# Patient Record
Sex: MALE | Race: WHITE | Employment: OTHER | URBAN - METROPOLITAN AREA
[De-identification: names, ages, dates, MRNs, and addresses within clinical notes are randomized per-mention and may not be internally consistent; named-entity substitution may affect disease eponyms.]

---

## 2022-06-10 LAB — SARS-COV-2: POSITIVE

## 2022-06-16 ENCOUNTER — APPOINTMENT (OUTPATIENT)
Dept: CT IMAGING | Age: 87
DRG: 177 | End: 2022-06-16
Payer: MEDICARE

## 2022-06-16 ENCOUNTER — HOSPITAL ENCOUNTER (INPATIENT)
Age: 87
LOS: 19 days | Discharge: NURSING, REHAB OR LONG-TERM CARE | DRG: 177 | End: 2022-07-05
Attending: EMERGENCY MEDICINE | Admitting: INTERNAL MEDICINE
Payer: MEDICARE

## 2022-06-16 ENCOUNTER — APPOINTMENT (OUTPATIENT)
Dept: GENERAL RADIOLOGY | Age: 87
DRG: 177 | End: 2022-06-16
Payer: MEDICARE

## 2022-06-16 DIAGNOSIS — R13.10 DYSPHAGIA, UNSPECIFIED TYPE: ICD-10-CM

## 2022-06-16 DIAGNOSIS — T17.908A ASPIRATION INTO AIRWAY, INITIAL ENCOUNTER: Primary | ICD-10-CM

## 2022-06-16 PROBLEM — R53.1 GENERALIZED WEAKNESS: Status: ACTIVE | Noted: 2022-06-16

## 2022-06-16 PROBLEM — J69.0 ACUTE ASPIRATION PNEUMONIA (HCC): Status: ACTIVE | Noted: 2022-06-16

## 2022-06-16 PROBLEM — J96.01 ACUTE RESPIRATORY FAILURE WITH HYPOXIA (HCC): Status: ACTIVE | Noted: 2022-06-16

## 2022-06-16 PROBLEM — U07.1 COVID-19 VIREMIA: Status: ACTIVE | Noted: 2022-06-16

## 2022-06-16 LAB
ANION GAP SERPL CALCULATED.3IONS-SCNC: 14 MMOL/L (ref 3–16)
BASE EXCESS VENOUS: -2.2 MMOL/L (ref -2–3)
BASOPHILS ABSOLUTE: 0 K/UL (ref 0–0.2)
BASOPHILS RELATIVE PERCENT: 0.1 %
BUN BLDV-MCNC: 46 MG/DL (ref 7–20)
CALCIUM SERPL-MCNC: 9.2 MG/DL (ref 8.3–10.6)
CARBOXYHEMOGLOBIN: 1.1 % (ref 0–1.5)
CHLORIDE BLD-SCNC: 103 MMOL/L (ref 99–110)
CO2: 19 MMOL/L (ref 21–32)
CREAT SERPL-MCNC: 1.7 MG/DL (ref 0.8–1.3)
EOSINOPHILS ABSOLUTE: 0 K/UL (ref 0–0.6)
EOSINOPHILS RELATIVE PERCENT: 0.3 %
GFR AFRICAN AMERICAN: 46
GFR NON-AFRICAN AMERICAN: 38
GLUCOSE BLD-MCNC: 102 MG/DL (ref 70–99)
GLUCOSE BLD-MCNC: 136 MG/DL (ref 70–99)
HCO3 VENOUS: 23.2 MMOL/L (ref 24–28)
HCT VFR BLD CALC: 46.1 % (ref 40.5–52.5)
HEMOGLOBIN, VEN, REDUCED: 62.9 %
HEMOGLOBIN: 15.3 G/DL (ref 13.5–17.5)
LACTIC ACID: 3.5 MMOL/L (ref 0.4–2)
LYMPHOCYTES ABSOLUTE: 0.7 K/UL (ref 1–5.1)
LYMPHOCYTES RELATIVE PERCENT: 6.4 %
MCH RBC QN AUTO: 29.4 PG (ref 26–34)
MCHC RBC AUTO-ENTMCNC: 33.1 G/DL (ref 31–36)
MCV RBC AUTO: 88.6 FL (ref 80–100)
METHEMOGLOBIN VENOUS: 0.4 % (ref 0–1.5)
MONOCYTES ABSOLUTE: 0.8 K/UL (ref 0–1.3)
MONOCYTES RELATIVE PERCENT: 7.4 %
NEUTROPHILS ABSOLUTE: 9.1 K/UL (ref 1.7–7.7)
NEUTROPHILS RELATIVE PERCENT: 85.8 %
O2 SAT, VEN: 36 %
PCO2, VEN: 41.2 MMHG (ref 41–51)
PDW BLD-RTO: 15.5 % (ref 12.4–15.4)
PERFORMED ON: ABNORMAL
PH VENOUS: 7.36 (ref 7.35–7.45)
PLATELET # BLD: 289 K/UL (ref 135–450)
PMV BLD AUTO: 9 FL (ref 5–10.5)
PO2, VEN: <30 MMHG (ref 25–40)
POTASSIUM REFLEX MAGNESIUM: 4.8 MMOL/L (ref 3.5–5.1)
PRO-BNP: 8292 PG/ML (ref 0–449)
RBC # BLD: 5.2 M/UL (ref 4.2–5.9)
SODIUM BLD-SCNC: 136 MMOL/L (ref 136–145)
TCO2 CALC VENOUS: 24 MMOL/L
TROPONIN: <0.01 NG/ML
WBC # BLD: 10.7 K/UL (ref 4–11)

## 2022-06-16 PROCEDURE — 82803 BLOOD GASES ANY COMBINATION: CPT

## 2022-06-16 PROCEDURE — 85025 COMPLETE CBC W/AUTO DIFF WBC: CPT

## 2022-06-16 PROCEDURE — 6360000002 HC RX W HCPCS: Performed by: EMERGENCY MEDICINE

## 2022-06-16 PROCEDURE — 71250 CT THORAX DX C-: CPT

## 2022-06-16 PROCEDURE — 80048 BASIC METABOLIC PNL TOTAL CA: CPT

## 2022-06-16 PROCEDURE — 2580000003 HC RX 258: Performed by: INTERNAL MEDICINE

## 2022-06-16 PROCEDURE — 6360000002 HC RX W HCPCS: Performed by: INTERNAL MEDICINE

## 2022-06-16 PROCEDURE — 84484 ASSAY OF TROPONIN QUANT: CPT

## 2022-06-16 PROCEDURE — 83880 ASSAY OF NATRIURETIC PEPTIDE: CPT

## 2022-06-16 PROCEDURE — 36415 COLL VENOUS BLD VENIPUNCTURE: CPT

## 2022-06-16 PROCEDURE — 83605 ASSAY OF LACTIC ACID: CPT

## 2022-06-16 PROCEDURE — 96365 THER/PROPH/DIAG IV INF INIT: CPT

## 2022-06-16 PROCEDURE — 1200000000 HC SEMI PRIVATE

## 2022-06-16 PROCEDURE — 99285 EMERGENCY DEPT VISIT HI MDM: CPT

## 2022-06-16 PROCEDURE — 2580000003 HC RX 258: Performed by: EMERGENCY MEDICINE

## 2022-06-16 PROCEDURE — 71045 X-RAY EXAM CHEST 1 VIEW: CPT

## 2022-06-16 RX ORDER — ACETAMINOPHEN 325 MG/1
650 TABLET ORAL EVERY 6 HOURS PRN
Status: DISCONTINUED | OUTPATIENT
Start: 2022-06-16 | End: 2022-06-16 | Stop reason: SDUPTHER

## 2022-06-16 RX ORDER — SODIUM CHLORIDE 0.9 % (FLUSH) 0.9 %
10 SYRINGE (ML) INJECTION EVERY 12 HOURS SCHEDULED
Status: DISCONTINUED | OUTPATIENT
Start: 2022-06-16 | End: 2022-07-05 | Stop reason: HOSPADM

## 2022-06-16 RX ORDER — POLYETHYLENE GLYCOL 3350 17 G/17G
17 POWDER, FOR SOLUTION ORAL DAILY PRN
Status: DISCONTINUED | OUTPATIENT
Start: 2022-06-16 | End: 2022-07-05 | Stop reason: HOSPADM

## 2022-06-16 RX ORDER — SODIUM CHLORIDE 0.9 % (FLUSH) 0.9 %
10 SYRINGE (ML) INJECTION PRN
Status: DISCONTINUED | OUTPATIENT
Start: 2022-06-16 | End: 2022-07-05 | Stop reason: HOSPADM

## 2022-06-16 RX ORDER — ACETAMINOPHEN 325 MG/1
650 TABLET ORAL EVERY 4 HOURS PRN
Status: DISCONTINUED | OUTPATIENT
Start: 2022-06-16 | End: 2022-07-05 | Stop reason: HOSPADM

## 2022-06-16 RX ORDER — SODIUM CHLORIDE 9 MG/ML
INJECTION, SOLUTION INTRAVENOUS CONTINUOUS
Status: DISCONTINUED | OUTPATIENT
Start: 2022-06-16 | End: 2022-06-20

## 2022-06-16 RX ORDER — AMOXICILLIN AND CLAVULANATE POTASSIUM 875; 125 MG/1; MG/1
1 TABLET, FILM COATED ORAL 2 TIMES DAILY
Qty: 14 TABLET | Refills: 0 | Status: SHIPPED | OUTPATIENT
Start: 2022-06-16 | End: 2022-06-29 | Stop reason: HOSPADM

## 2022-06-16 RX ORDER — ONDANSETRON 2 MG/ML
4 INJECTION INTRAMUSCULAR; INTRAVENOUS EVERY 4 HOURS PRN
Status: DISCONTINUED | OUTPATIENT
Start: 2022-06-16 | End: 2022-07-05 | Stop reason: HOSPADM

## 2022-06-16 RX ORDER — IPRATROPIUM BROMIDE AND ALBUTEROL SULFATE 2.5; .5 MG/3ML; MG/3ML
1 SOLUTION RESPIRATORY (INHALATION) ONCE
Status: DISCONTINUED | OUTPATIENT
Start: 2022-06-16 | End: 2022-06-16

## 2022-06-16 RX ORDER — ACETAMINOPHEN 650 MG/1
650 SUPPOSITORY RECTAL EVERY 6 HOURS PRN
Status: DISCONTINUED | OUTPATIENT
Start: 2022-06-16 | End: 2022-06-16 | Stop reason: SDUPTHER

## 2022-06-16 RX ORDER — ACETAMINOPHEN 650 MG/1
650 SUPPOSITORY RECTAL EVERY 4 HOURS PRN
Status: DISCONTINUED | OUTPATIENT
Start: 2022-06-16 | End: 2022-07-05 | Stop reason: HOSPADM

## 2022-06-16 RX ORDER — ENOXAPARIN SODIUM 100 MG/ML
40 INJECTION SUBCUTANEOUS DAILY
Status: DISCONTINUED | OUTPATIENT
Start: 2022-06-17 | End: 2022-07-05 | Stop reason: HOSPADM

## 2022-06-16 RX ORDER — IPRATROPIUM BROMIDE AND ALBUTEROL SULFATE 2.5; .5 MG/3ML; MG/3ML
1 SOLUTION RESPIRATORY (INHALATION)
Status: DISCONTINUED | OUTPATIENT
Start: 2022-06-16 | End: 2022-06-16

## 2022-06-16 RX ORDER — SODIUM CHLORIDE 9 MG/ML
INJECTION, SOLUTION INTRAVENOUS PRN
Status: DISCONTINUED | OUTPATIENT
Start: 2022-06-16 | End: 2022-07-05 | Stop reason: HOSPADM

## 2022-06-16 RX ADMIN — SODIUM CHLORIDE, PRESERVATIVE FREE 10 ML: 5 INJECTION INTRAVENOUS at 22:13

## 2022-06-16 RX ADMIN — SODIUM CHLORIDE: 9 INJECTION, SOLUTION INTRAVENOUS at 23:19

## 2022-06-16 RX ADMIN — SODIUM CHLORIDE 3000 MG: 900 INJECTION INTRAVENOUS at 19:00

## 2022-06-16 RX ADMIN — SODIUM CHLORIDE 3000 MG: 900 INJECTION INTRAVENOUS at 23:18

## 2022-06-16 ASSESSMENT — PAIN DESCRIPTION - ORIENTATION: ORIENTATION: LEFT

## 2022-06-16 ASSESSMENT — PAIN SCALES - GENERAL: PAINLEVEL_OUTOF10: 0

## 2022-06-16 ASSESSMENT — PAIN DESCRIPTION - LOCATION: LOCATION: KNEE

## 2022-06-16 NOTE — ED PROVIDER NOTES
[address] [facility]          ATTENDING PHYSICIAN NOTE       Date of evaluation: 6/16/2022    Chief Complaint     Shortness of Breath (COVID positive, felt SOB. had xray done at [facility] and showed aspiration. normally not on oxygen, was satting at 80% on 2 L. put on 6 L by EMS )      History of Present Illness     Demond Mcghee is a 80 y.o. male who presents with an abnormal x-ray (showing possible aspiration) from [address] [place] [address],First Floor home where he was placed yesterday after a stay at [facility]. The patient had presented a week ago to [facility] after falling and not being able to get up. He was found to be generally weak and actually tested positive for COVID-19. As best I can tell from the records there, which are limited secondary to lack of a discharge summary, the patient tested positive for COVID-19 but never had an oxygen requirement. The nursing home apparently reported to EMS that he aspirated while attempting to eat and they had to place him on 2 L of oxygen but that was not helping. EMS apparently found the patient's oxygen saturation to be 80% and they placed him on 6 L and brought him in. The patient is overall a poor historian and hard to get any information from. The patient's family member was unaware that the patient ever required oxygen for COVID while in [facility]. Review of Systems     Review of Systems   Unable to perform ROS: Mental status change       Past Medical, Surgical, Family, and Social History     He has no past medical history on file. He has no past surgical history on file. His family history is not on file. He reports that he has never smoked. He has never used smokeless tobacco. He reports previous alcohol use. He reports that he does not use drugs. Medications     Previous Medications    No medications on file       Allergies     He has No Known Allergies.     Physical Exam     INITIAL VITALS: BP: 112/72, Temp: 98 °F (36.7 °C), Heart Rate: 82, Resp: 21, SpO2: 100 %   Physical Exam  Vitals and nursing note reviewed. Constitutional:       General: He is not in acute distress. Appearance: He is well-developed. He is ill-appearing. He is not diaphoretic. Cardiovascular:      Rate and Rhythm: Normal rate and regular rhythm. Pulmonary:      Effort: Pulmonary effort is normal. No tachypnea or accessory muscle usage. Breath sounds: Normal breath sounds. Abdominal:      General: Bowel sounds are normal. There is no distension. Palpations: Abdomen is soft. Tenderness: There is no abdominal tenderness. Skin:     General: Skin is warm and dry. Neurological:      Mental Status: He is alert and oriented to person, place, and time. Psychiatric:         Behavior: Behavior normal.         Diagnostic Results     RADIOLOGY:  CT CHEST WO CONTRAST   Final Result   1. Right lower lobe bronchovascular crowding and airspace disease, small pneumonia with adjacent pleural thickening   2. Large hiatal hernia   3. Cardiomegaly with coronary artery calcification   4. Vertebral body fracture of L1 is age indeterminate. XR CHEST PORTABLE   Final Result   1. Cardiomegaly and aortic tortuosity   2. Minimal hazy density right lung base, differential radiolucency of the lungs is secondary to patient rotation   3. Hypertrophic osteophytes of the shoulders, left greater than right          LABS:   Results for orders placed or performed during the hospital encounter of 06/16/22   CBC with Auto Differential   Result Value Ref Range    WBC 10.7 4.0 - 11.0 K/uL    RBC 5.20 4. 20 - 5.90 M/uL    Hemoglobin 15.3 13.5 - 17.5 g/dL    Hematocrit 46.1 40.5 - 52.5 %    MCV 88.6 80.0 - 100.0 fL    MCH 29.4 26.0 - 34.0 pg    MCHC 33.1 31.0 - 36.0 g/dL    RDW 15.5 (H) 12.4 - 15.4 %    Platelets 681 554 - 477 K/uL    MPV 9.0 5.0 - 10.5 fL    Neutrophils % 85.8 %    Lymphocytes % 6.4 %    Monocytes % 7.4 %    Eosinophils % 0.3 %    Basophils % 0.1 %    Neutrophils Absolute 9.1 (H) 1.7 - 7.7 K/uL    Lymphocytes Absolute 0.7 (L) 1.0 - 5.1 K/uL    Monocytes Absolute 0.8 0.0 - 1.3 K/uL    Eosinophils Absolute 0.0 0.0 - 0.6 K/uL    Basophils Absolute 0.0 0.0 - 0.2 K/uL   Basic Metabolic Panel w/ Reflex to MG   Result Value Ref Range    Sodium 136 136 - 145 mmol/L    Potassium reflex Magnesium 4.8 3.5 - 5.1 mmol/L    Chloride 103 99 - 110 mmol/L    CO2 19 (L) 21 - 32 mmol/L    Anion Gap 14 3 - 16    Glucose 102 (H) 70 - 99 mg/dL    BUN 46 (H) 7 - 20 mg/dL    CREATININE 1.7 (H) 0.8 - 1.3 mg/dL    GFR Non- 38 (A) >60    GFR  46 (A) >60    Calcium 9.2 8.3 - 10.6 mg/dL   Brain Natriuretic Peptide   Result Value Ref Range    Pro-BNP 8,292 (H) 0 - 449 pg/mL   Troponin   Result Value Ref Range    Troponin <0.01 <0.01 ng/mL   Blood Gas, Venous   Result Value Ref Range    pH, Ajay 7.359 7.350 - 7.450    pCO2, Ajay 41.2 41.0 - 51.0 mmHg    pO2, Ajay <30.0 25.0 - 40.0 mmHg    HCO3, Venous 23.2 (L) 24.0 - 28.0 mmol/L    Base Excess, Ajay -2.2 (L) -2.0 - 3.0 mmol/L    O2 Sat, Ajay 36 Not established %    Carboxyhemoglobin 1.1 0.0 - 1.5 %    MetHgb, Ajay 0.4 0.0 - 1.5 %    TC02 (Calc), Ajay 24 mmol/L    Hemoglobin, Ajay, Reduced 62.90 %   Lactic Acid   Result Value Ref Range    Lactic Acid 3.5 (H) 0.4 - 2.0 mmol/L   POCT Glucose   Result Value Ref Range    POC Glucose 136 (H) 70 - 99 mg/dl    Performed on ACCU-CHEK        RECENT VITALS:  BP: 107/84,Temp: 98 °F (36.7 °C), Heart Rate: 92, Resp: 25, SpO2: 95 %       ED Course     Nursing Notes, Past Medical Hx, Past Surgical Hx, Social Hx,Allergies, and Family Hx were reviewed.     patient was given the following medications:  Orders Placed This Encounter   Medications    DISCONTD: ipratropium-albuterol (DUONEB) nebulizer solution 1 ampule     Order Specific Question:   Initiate RT Bronchodilator Protocol     Answer:   No    DISCONTD: ipratropium-albuterol (DUONEB) nebulizer solution 1 ampule Order Specific Question:   Initiate RT Bronchodilator Protocol     Answer:   No    amoxicillin-clavulanate (AUGMENTIN) 875-125 MG per tablet     Sig: Take 1 tablet by mouth 2 times daily for 7 days Crushed in puree     Dispense:  14 tablet     Refill:  0    ampicillin-sulbactam (UNASYN) 3000 mg in 100 mL NS IVPB minibag     Order Specific Question:   Antimicrobial Indications     Answer:   Aspiration Pneumonia       CONSULTS:  None    MEDICAL DECISIONMAKING / ASSESSMENT / Skip Stephen is a 80 y.o. male with a recent hospitalization for a fall and deconditioning likely secondary to COVID-19 which she was diagnosed with 7 days ago who is presenting after a possible aspiration episode. Initially he was reportedly hypoxic but he has been off oxygen here the entire time and oxygen saturations have been 95 to 100%. He does not appear visibly short of breath. The patient's lung sounds are actually clear. The x-ray report was reviewed and it did say concern for possible aspiration with right lower lobe airspace disease. A repeat x-ray was done here which shows a similar finding but it was less clear what it represented therefore CT scan was performed which does show a small focal area of airspace disease consistent with pneumonia. Labs are reassuring his white count is normal.  proBNP was elevated but the significance is not clear because he is not hypoxic and does not appear fluid overloaded and CT is not showing pulmonary edema. I was able to review imaging and other records from [facility] with the exception of the discharge summary and it was found that the patient had undergone a swallowing evaluation there which was recommending thickened liquids and minced and moist and food and the patient was definitely not getting that type of diet at [facility]. In addition he had a negative chest x-ray there and Dopplers of his lower extremities that were both negative.   Based on the fact that the patient is not hypoxic here, was not getting a proper diet based on swallowing evaluation, and has reassuring labs, I feel he can be discharged back to the nursing home but started on oral antibiotics for possible aspiration pneumonia. He was given an initial dose of Unasyn IV here and will be prescribed Augmentin at discharge. All of these plans were reviewed with the patient's son and daughter and they are agreeable to this plan moving forward. Certainly if the patient develops worsening symptoms or becomes hypoxic, he can turn to the hospital for more aggressive management. The nursing home will be provided with the dietary recommendations by the speech therapist from [facility]. Clinical Impression     1. Aspiration into airway, initial encounter        Disposition     PATIENT REFERRED TO:  Lory Nieto, [address] [facility]    In 5 days      [facility]  [address]  [phone]    If symptoms worsen and becomes hypoxic      DISCHARGE MEDICATIONS:  New Prescriptions    AMOXICILLIN-CLAVULANATE (AUGMENTIN) 875-125 MG PER TABLET    Take 1 tablet by mouth 2 times daily for 7 days Crushed in puree       DISPOSITION Discharge - Pending Orders Complete 06/16/2022 06:31:17 PM       Isidoro Crockett MD  06/16/22 [id number]      ADDENDUM:  While the patient was getting his first dose of Unasyn, he was noted to have some increased work of breathing and his oxygen saturation dropped to 90%. He had to be placed on supplemental oxygen of a couple liters. At this point disposition has changed. The patient is likely now becoming symptomatic of the aspiration that occurred earlier in the day and I think will need to be staying in the hospital at this point and the hospitalist was contacted for admission.      Isidoro Crockett MD  06/16/22 [id number]

## 2022-06-16 NOTE — ED TRIAGE NOTES
Pt COVID positive, coming from [facility]. Per EMS, pt felt SOB and had x ray done, which showed aspiration. Pt was put on 2 L by nursing facility and o2 sat was in the 80s.  EMS put pt on 6 L oxygen en route and sats were 100%

## 2022-06-17 ENCOUNTER — APPOINTMENT (OUTPATIENT)
Dept: GENERAL RADIOLOGY | Age: 87
DRG: 177 | End: 2022-06-17
Payer: MEDICARE

## 2022-06-17 LAB
ANION GAP SERPL CALCULATED.3IONS-SCNC: 14 MMOL/L (ref 3–16)
BASOPHILS ABSOLUTE: 0.1 K/UL (ref 0–0.2)
BASOPHILS RELATIVE PERCENT: 0.9 %
BUN BLDV-MCNC: 45 MG/DL (ref 7–20)
CALCIUM SERPL-MCNC: 8.5 MG/DL (ref 8.3–10.6)
CHLORIDE BLD-SCNC: 108 MMOL/L (ref 99–110)
CO2: 18 MMOL/L (ref 21–32)
CREAT SERPL-MCNC: 1.7 MG/DL (ref 0.8–1.3)
EOSINOPHILS ABSOLUTE: 0 K/UL (ref 0–0.6)
EOSINOPHILS RELATIVE PERCENT: 0.2 %
GFR AFRICAN AMERICAN: 46
GFR NON-AFRICAN AMERICAN: 38
GLUCOSE BLD-MCNC: 98 MG/DL (ref 70–99)
HCT VFR BLD CALC: 38.6 % (ref 40.5–52.5)
HEMOGLOBIN: 13 G/DL (ref 13.5–17.5)
LYMPHOCYTES ABSOLUTE: 0.5 K/UL (ref 1–5.1)
LYMPHOCYTES RELATIVE PERCENT: 4.8 %
MCH RBC QN AUTO: 30 PG (ref 26–34)
MCHC RBC AUTO-ENTMCNC: 33.7 G/DL (ref 31–36)
MCV RBC AUTO: 89.2 FL (ref 80–100)
MONOCYTES ABSOLUTE: 0.8 K/UL (ref 0–1.3)
MONOCYTES RELATIVE PERCENT: 8.1 %
NEUTROPHILS ABSOLUTE: 8.5 K/UL (ref 1.7–7.7)
NEUTROPHILS RELATIVE PERCENT: 86 %
PDW BLD-RTO: 14.7 % (ref 12.4–15.4)
PLATELET # BLD: 223 K/UL (ref 135–450)
PMV BLD AUTO: 8.5 FL (ref 5–10.5)
POTASSIUM REFLEX MAGNESIUM: 4.4 MMOL/L (ref 3.5–5.1)
RBC # BLD: 4.33 M/UL (ref 4.2–5.9)
SODIUM BLD-SCNC: 140 MMOL/L (ref 136–145)
WBC # BLD: 9.9 K/UL (ref 4–11)

## 2022-06-17 PROCEDURE — 6370000000 HC RX 637 (ALT 250 FOR IP): Performed by: INTERNAL MEDICINE

## 2022-06-17 PROCEDURE — 6360000002 HC RX W HCPCS: Performed by: INTERNAL MEDICINE

## 2022-06-17 PROCEDURE — 97166 OT EVAL MOD COMPLEX 45 MIN: CPT

## 2022-06-17 PROCEDURE — 2580000003 HC RX 258: Performed by: INTERNAL MEDICINE

## 2022-06-17 PROCEDURE — 80048 BASIC METABOLIC PNL TOTAL CA: CPT

## 2022-06-17 PROCEDURE — 74230 X-RAY XM SWLNG FUNCJ C+: CPT

## 2022-06-17 PROCEDURE — 92610 EVALUATE SWALLOWING FUNCTION: CPT

## 2022-06-17 PROCEDURE — 1200000000 HC SEMI PRIVATE

## 2022-06-17 PROCEDURE — 92611 MOTION FLUOROSCOPY/SWALLOW: CPT

## 2022-06-17 PROCEDURE — 97530 THERAPEUTIC ACTIVITIES: CPT

## 2022-06-17 PROCEDURE — 36415 COLL VENOUS BLD VENIPUNCTURE: CPT

## 2022-06-17 PROCEDURE — 85025 COMPLETE CBC W/AUTO DIFF WBC: CPT

## 2022-06-17 RX ORDER — PANTOPRAZOLE SODIUM 40 MG/1
40 TABLET, DELAYED RELEASE ORAL 2 TIMES DAILY
Status: ON HOLD | COMMUNITY
End: 2022-06-23 | Stop reason: SDUPTHER

## 2022-06-17 RX ORDER — ACETAMINOPHEN 325 MG/1
650 TABLET ORAL EVERY 6 HOURS PRN
COMMUNITY

## 2022-06-17 RX ORDER — ZIPRASIDONE MESYLATE 20 MG/ML
10 INJECTION, POWDER, LYOPHILIZED, FOR SOLUTION INTRAMUSCULAR ONCE
Status: COMPLETED | OUTPATIENT
Start: 2022-06-18 | End: 2022-06-18

## 2022-06-17 RX ORDER — CALCIUM CARBONATE 200(500)MG
2 TABLET,CHEWABLE ORAL EVERY 6 HOURS PRN
COMMUNITY

## 2022-06-17 RX ORDER — METOPROLOL SUCCINATE 50 MG/1
50 TABLET, EXTENDED RELEASE ORAL DAILY
Status: ON HOLD | COMMUNITY
End: 2022-06-29 | Stop reason: HOSPADM

## 2022-06-17 RX ADMIN — SODIUM CHLORIDE 3000 MG: 900 INJECTION INTRAVENOUS at 05:51

## 2022-06-17 RX ADMIN — ACETAMINOPHEN 650 MG: 325 TABLET ORAL at 14:39

## 2022-06-17 RX ADMIN — SODIUM CHLORIDE 3000 MG: 900 INJECTION INTRAVENOUS at 23:30

## 2022-06-17 RX ADMIN — SODIUM CHLORIDE 3000 MG: 900 INJECTION INTRAVENOUS at 09:42

## 2022-06-17 RX ADMIN — ENOXAPARIN SODIUM 40 MG: 100 INJECTION SUBCUTANEOUS at 09:43

## 2022-06-17 RX ADMIN — SODIUM CHLORIDE, PRESERVATIVE FREE 10 ML: 5 INJECTION INTRAVENOUS at 19:35

## 2022-06-17 RX ADMIN — SODIUM CHLORIDE: 9 INJECTION, SOLUTION INTRAVENOUS at 14:33

## 2022-06-17 RX ADMIN — SODIUM CHLORIDE 3000 MG: 900 INJECTION INTRAVENOUS at 16:43

## 2022-06-17 ASSESSMENT — PAIN DESCRIPTION - LOCATION: LOCATION: GENERALIZED

## 2022-06-17 ASSESSMENT — PAIN SCALES - GENERAL: PAINLEVEL_OUTOF10: 8

## 2022-06-17 NOTE — PROGRESS NOTES
Occupational Therapy  Facility/Department: [address]  Occupational Therapy Initial Assessment & Tx    Name: Chandrika Arroyo  : 1935  MRN: [id number]  Date of Service: 2022    Discharge Recommendations: Chandrika Arroyo scored a 10/24 on the AM-PAC ADL Inpatient form. Current research shows that an AM-PAC score of 17 or less is typically not associated with a discharge to the patient's home setting. Based on the patient's AM-PAC score and their current ADL deficits, it is recommended that the patient have 3-5 sessions per week of Occupational Therapy at d/c to increase the patient's independence. Please see assessment section for further patient specific details. If patient discharges prior to next session this note will serve as a discharge summary. Please see below for the latest assessment towards goals. OT Equipment Recommendations  Equipment Needed: No  Other: defer       Patient Diagnosis(es): The encounter diagnosis was Aspiration into airway, initial encounter. Past Medical History:  has no past medical history on file. Past Surgical History:  has no past surgical history on file. Treatment Diagnosis: impaired mobility, transfers, ADL      Assessment   Performance deficits / Impairments: Decreased functional mobility ; Decreased ADL status; Decreased endurance;Decreased strength;Decreased cognition;Decreased posture  Assessment: Pt admit from rehab facility [address]. Questionable historian, confused and disoriented throughout session. Pt requiring Mod to MaxA with all bed mobility this date, limited by pain and cognition. Pt sitting EOB with Min to 100 Medical [place]. Unable to tolerate for longer, unable to tolerate standing at this time. Assist with ADLs. Would benefit from cont OT while in acute care and cont inpt at [place].   Treatment Diagnosis: impaired mobility, transfers, ADL  Decision Making: Medium Complexity  REQUIRES OT FOLLOW-UP: Yes  Activity Tolerance  Activity Tolerance: Patient limited by pain; Patient limited by fatigue;Treatment limited secondary to decreased cognition        Plan   Plan  Times per Week: 2-5  Times per Day: Daily     Restrictions  Position Activity Restriction  Other position/activity restrictions: up with assist    Subjective   General  Chart Reviewed: Yes  Additional Pertinent Hx: 80y.o. year-old male who was admitted to [facility] 1 week ago for falls and increased weakness. He was found to have COVID-19 but had no symptoms other than increased weakness. While there he failed a swallow evaluation and was advised to follow a dysphagia diet with thickened liquids. He was discharged yesterday to [facility] for physical therapy rehab. Apparently the recommended diet was not followed and he aspirated. He developed shortness of breath and an x-ray was done which was consistent with aspiration. On evaluation in the emergency room he was found to have a new oxygen requirement associated with aspiration pneumonia. Referring Practitioner: Jenelle Martins MD  Diagnosis: aspiration  Subjective  Subjective: In bed agreeable with encouragement, reporting pain in bilat LE, confused at times, [place]     Social/Functional History  Social/Functional History  Lives With: Alone  Type of Home: House  Home Equipment: [place]  Has the patient had two or more falls in the past year or any fall with injury in the past year?: Yes  Additional Comments: Questionable historian. Cognition, disoriented, + [place]. Reporting from home alone stating son and daughter can check in. Son lives in [place], daughter in [place]. Per chart pt with recent admission to [facility] and dc to rehab. Pt reporting he has not walked in a long time, uses a cane for amb.        Objective   Heart Rate: 91  Heart Rate Source: Monitor  BP: (!) 134/92  BP Location: Right upper arm  BP Method: Automatic  MAP (Calculated): 106  Resp: 18  SpO2: 98 %  O2 Device: Nasal cannula  Hearing: Exceptions to [facility]  Hearing Exceptions: Hard of hearing/hearing concerns          Safety Devices  Type of Devices: All fall risk precautions in place; Bed alarm in place;Call light within reach; Patient at risk for falls; Left in bed;Nurse notified  Bed Mobility Training  Bed Mobility Training: Yes  Overall Level of Assistance: Maximum assistance  Rolling: Moderate assistance  Supine to Sit: Moderate assistance  Sit to Supine: Maximum assistance  Scooting: Maximum assistance;Assist X2  Balance  Sitting: With support (Min to 100 Medical [place] sit EOB ~5-7 min unable to tolerate 2/2 pain)  Standing:  (unable)  Transfer Training  Transfer Training: No (unable)     AROM: Generally decreased, functional (BUE)  PROM: Within functional limits (BUE)  Strength: Generally decreased, functional (BUE limited testing 2/2 cognition and command following)  ADL  Feeding: NPO  Grooming: Moderate assistance (wipe face seated EOB)  UE Dressing: Moderate assistance  LE Dressing: Dependent/Total  Toileting:  (cath and brief donned)                 Cognition  Overall Cognitive Status: Exceptions  Arousal/Alertness: Delayed responses to stimuli  Following Commands: Inconsistently follows commands  Attention Span: Difficulty attending to directions; Difficulty dividing attention  Memory: Decreased recall of biographical Information;Decreased recall of recent events;Decreased short term memory  Safety Judgement: Decreased awareness of need for safety;Decreased awareness of need for assistance  Problem Solving: Decreased awareness of errors  Insights: Decreased awareness of deficits  Initiation: Requires cues for some  Sequencing: Requires cues for some                  Education Given To: Patient  Education Provided: Role of Therapy;Plan of Care  Education Method: Demonstration;Verbal  Barriers to Learning: Hearing;Cognition  Education Outcome: Continued education needed; Unable to verbalize; Unable to demonstrate understanding                        AM-PAC Score        AM-PAC Inpatient Daily Activity Raw Score: 10 (06/17/22 1155)  AM-PAC Inpatient ADL T-Scale Score : 27.31 (06/17/22 1155)  ADL Inpatient CMS 0-100% Score: 74.7 (06/17/22 1155)  ADL Inpatient CMS G-Code Modifier : CL (06/17/22 1155)    Goals  Short Term Goals  Time Frame for Short term goals: dc  Short Term Goal 1: supine to sit [place]  Short Term Goal 2: sit balance CGA EOB for 5 min  Short Term Goal 3: grooming ADL EOB with CGA  Short Term Goal 4: Tolerate fx transfer assessment       Therapy Time   Individual Concurrent Group Co-treatment   Time In 0820         Time Out 0900         Minutes 40              Timed Code Treatment Minutes:   25    Total Treatment Minutes:  JOSE ROBERTO Jung

## 2022-06-17 NOTE — H&P
Hospital Medicine  History and Physical    PCP: Adalberto Conner MD  Patient Name: Anna Solis    Date of Service: Pt seen/examined on 6/16/22 and admitted to Inpatient with expected LOS greater than two midnights due to medical therapy    CHIEF COMPLAINT:  Aspiration Pneumonia  HISTORY OF PRESENT ILLNESS:   Patient is a poor historian at this time, and information for this report is derived from conversation with the emergency room physician, review of the records and from conversations with patient's family at bedside. Pt is an 80y.o. year-old male who was admitted to [facility] 1 week ago for falls and increased weakness. He was found to have COVID-19 but had no symptoms other than increased weakness. While there he failed a swallow evaluation and was advised to follow a dysphagia diet with thickened liquids. He was discharged yesterday to [facility] for physical therapy rehab. Apparently the recommended diet was not followed and he aspirated. He developed shortness of breath and an x-ray was done which was consistent with aspiration. On evaluation in the emergency room he was found to have a new oxygen requirement associated with aspiration pneumonia. He is being admitted for further evaluation and treatment. Associated signs and symptoms do not include fever or chills, nausea, vomiting, abdominal pain, hemoptysis, hematochezia, diarrhea, constipation or urinary symptoms. Past Medical History:    No past medical history on file. Past Surgical History:    No past surgical history on file. Allergies:  Patient has no known allergies. Medications Prior to Admission:    Prior to Admission medications    Medication Sig Start Date End Date Taking?  Authorizing Provider   amoxicillin-clavulanate (AUGMENTIN) 875-125 MG per tablet Take 1 tablet by mouth 2 times daily for 7 days Crushed in puree 6/16/22 6/23/22 Yes Greta Glez MD       Family History:   Family history is negative for accelerated coronary artery disease, diabetes or malignancies. Social History:   TOBACCO:   reports that he has never smoked. He has never used smokeless tobacco.  ETOH:   reports previous alcohol use. OCCUPATION:      REVIEW OF SYSTEMS:  A full review of systems was performed and is negative except for that which appears in the HPI    Physical Exam:    Vitals: /84   Pulse 92   Temp 98 °F (36.7 °C) (Oral)   Resp 25   Wt 156 lb (70.8 kg)   SpO2 95%   General appearance: Ill appearing 80y.o. year-old male who is confused, appears stated age  HEENT: Head: Normocephalic, no lesions, without obvious abnormality. Eye: Normal external eye, conjunctiva, lids cornea, PEERL. Ears: Normal external ears. Non-tender. Nose: Normal external nose, mucus membranes and septum. Pharynx: Dental Hygiene adequate. Normal buccal mucosa. Normal pharynx. Neck: no adenopathy, no carotid bruit, no JVD, supple, symmetrical, trachea midline and thyroid not enlarged, symmetric, no tenderness/mass/nodules  Lungs: clear to auscultation bilaterally and no use of accessory muscles  Heart: regular rate and rhythm, S1, S2 normal, no murmur, click, rub or gallop and normal apical impulse  Abdomen: soft, non-tender; bowel sounds normal; no masses, no organomegaly  Extremities: extremities atraumatic, no cyanosis or edema and Homans sign is negative, no sign of DVT. Capillary Refill: Acceptable < 3 seconds   Peripheral Pulses: +3 easily felt, not easily obliterated with pressures   Skin: Skin color, texture, turgor normal. No rashes or lesions on exposed skin  Neurologic: Neurovascularly intact without any focal sensory/motor deficits. Cranial nerves: II-XII intact, grossly non-focal. Gait was not tested.   Mental Status: Alert and oriented x 0, thought content inappropriate, poor insight        CBC:   Recent Labs     06/16/22  1550   WBC 10.7   HGB 15.3        BMP:    Recent Labs     06/16/22  1550    K 4.8      CO2 19*   BUN 46*   CREATININE 1.7*   GLUCOSE 102*     Troponin:   Recent Labs     06/16/22  1550   TROPONINI <0.01     PT/INR:  No results found for: PTINR  U/A:  No results found for: LEUKOCYTESUR, NITRITE, RBCUA, SPECGRAV, UROBILINOGEN, BILIRUBINUR, BLOODU, Trejo Dunnings      RAD:   I have independently reviewed and interpreted the imaging studies below and based my recommendations to the patient on those findings. CT CHEST WO CONTRAST    Result Date: 6/16/2022  k EXAM: CT CHEST WITHOUT CONTRAST INDICATION: Abnormal chest x-ray, right lower lobe airspace disease COMPARISON: 6/16/2022 TECHNIQUE: CT of the chest was performed without contrast. Axial images, multiplanar reformatted images and axial maximum intensity projection images provided for review. Individualized dose optimization technique was used in order to meet ALARA standards for radiation dose reduction. In addition to vendor specific dose reduction algorithms, the dose reduction techniques vary based on the specific scanner utilized but frequently include automated exposure control, adjustment of the mA and/or kV  according to patient size, and use of iterative reconstruction technique. CONTRAST: None. FINDINGS: There is a large hiatal hernia present which extends to the right Right basilar airspace disease, bronchovascular crowding and pleural thickening is noted. Coronary artery calcification present Pericardium is normal. No congestive changes. Upper lobes are normal No adenopathy Multilevel degenerative changes. L1 vertebral body compression fracture with 60% loss of height, age indeterminate Atrophy of the right kidney. Colonic dilatation, gaseous. Nonspecific     1. Right lower lobe bronchovascular crowding and airspace disease, small pneumonia with adjacent pleural thickening 2. Large hiatal hernia 3. Cardiomegaly with coronary artery calcification 4. Vertebral body fracture of L1 is age indeterminate.     XR CHEST PORTABLE    Result Date: 6/16/2022  Chest AP portable at 1547 INDICATIONS: Shortness of breath Patient rotated to the right Tortuous aorta Cardiomegaly Right costophrenic angle blunting probably secondary to rotation Normal vascular markings No interstitial edema or effusion Degenerative hypertrophic osteoarthritis of the shoulders, bilateral     1. Cardiomegaly and aortic tortuosity 2. Minimal hazy density right lung base, differential radiolucency of the lungs is secondary to patient rotation 3. Hypertrophic osteophytes of the shoulders, left greater than right        Assessment:   Principal Problem:    Acute aspiration pneumonia (HCC)  Active Problems:    Acute respiratory failure with hypoxia ([facility])    COVID-19 viremia - Tested positive 06/09/2022    Generalized weakness  Resolved Problems:    * No resolved hospital problems. *      Plan:       Pneumonia, due to aspiration - Pt has been started on Unasyn  - Pt tested positive for the COVID-19 virus in 06/09/2022, and dos not appear to have COVID pneumonia  - Blood cultures x 2 ordered  - Respiratory culture ordered  - Legionella pneumophilia and Strep pneumoniae urine antigens ordered    COVID-19 viremia - Tested positive 06/09/2022 - Provide symptomatic treatment    Acute respiratory failure with hypoxia ([facility] [address] - due to aspiration pneumonia, not COVID. As evidenced by an oxygen saturation of less than 90% on room air. We will provide oxygen as needed to maintain an oxygen saturation of 92% or higher.     Aspiration - Will start a dysphagia diet with thickened liquids pending a swallow evaluation  - He was discharged from [facility] yesterday on a thickened liquid, dysphagia diet which was not followed    Generalized weakness - Will ask PT/OT to evaluate and treat patient, and if necessary to provide recommendations for post hospital therapy    Acute metabolic encephalopathy -we will provide supportive care             Code Status: Full  Diet: dysphagia diet with thickened liquids  DVT Prophylaxis: Lovenox  Low intensity Enoxaparin Heparin SCDs Coumadin continued Xarelto Eliquis Not indicated, as patient is ambulatory    (Advanced care planning has been discussed with patient and/or responsible family member and is reflected in the code status.  Further orders associated with this have been entered if appropriate)    Disposition: Anticipate that patient will remain in the hospital for 2 or more midnights depending on further evaluation and clinical course    Please note that over 50 minutes was spent in evaluating the patient, review of records and results, discussion with staff/family, etc.      Jessenia St MD

## 2022-06-17 NOTE — CONSULTS
List of Home Medications the patient is currently taking is complete. Home Medication list in EPIC updated to reflect changes noted below. Source of medications in list is [facility] medications list.      Medications added:   Acetaminophen   Calcium carbonate   Metoprolol succinate   Pantoprazole    Medications removed:   None     Medications adjusted:   None     Please call with any questions.   Norma SamuelD, BCPS  Wireless: [id number]  Main pharmacy: [id number]  6/17/2022 8:49 AM      Current Outpatient Medications   Medication Instructions    acetaminophen (TYLENOL) 650 mg, Oral, EVERY 6 HOURS PRN    amoxicillin-clavulanate (AUGMENTIN) 875-125 MG per tablet 1 tablet, Oral, 2 TIMES DAILY, Crushed in puree    calcium carbonate (TUMS) 500 MG chewable tablet 2 tablets, Oral, EVERY 6 HOURS PRN    metoprolol succinate (TOPROL XL) 50 mg, Oral, DAILY, Hold for HR < 60 or SBP < 100     pantoprazole (PROTONIX) 40 mg, Oral, 2 times daily

## 2022-06-17 NOTE — PROGRESS NOTES
4 Eyes Admission Assessment     I agree as the admission nurse that 2 RN's have performed a thorough Head to Toe Skin Assessment on the patient. ALL assessment sites listed below have been assessed on admission. Areas assessed by both nurses: ***  [x]   Head, Face, and Ears   [x]   Shoulders, Back, and Chest  [x]   Arms, Elbows, and Hands   [x]   Coccyx, Sacrum, and Ischium  [x]   Legs, Feet, and Heels        Does the Patient have Skin Breakdown?   No         Pérez Prevention initiated:  No   Wound Care Orders initiated:  No      [facility] nurse consulted for Pressure Injury (Stage 3,4, Unstageable, DTI, NWPT, and Complex wounds) or Pérez score 18 or lower:  No      Nurse 1 eSignature: Electronically signed by Indy Hsu RN on 6/17/22 at 5:13 AM EDT    **SHARE this note so that the co-signing nurse is able to place an eSignature**    Nurse 2 eSignature: {Esignature:[id number]}

## 2022-06-17 NOTE — PROCEDURES
INSTRUMENTAL SWALLOW REPORT  MODIFIED BARIUM SWALLOW    NAME: Yadiel Charles   : 1935  MRN: [id number]       Date of Eval: 2022     Ordering Physician: Dr. Anny Washington  Radiologist: Dr. Vielka Brown     Referring Diagnosis(es): Referring Diagnosis: Dysphagia    Past Medical History:  has no past medical history on file. Past Surgical History:  has no past surgical history on file. Date of Prior Study: none found in chart review  Type of Study: Initial MBS       Recent CXR: 2022  Impression   1. Cardiomegaly and aortic tortuosity   2. Minimal hazy density right lung base, differential radiolucency of the lungs is secondary to patient rotation   3. Hypertrophic osteophytes of the shoulders, left greater than right       CT of Chest: 2022  Impression   1. Right lower lobe bronchovascular crowding and airspace disease, small pneumonia with adjacent pleural thickening   2. Large hiatal hernia   3. Cardiomegaly with coronary artery calcification   4. Vertebral body fracture of L1 is age indeterminate. Patient Complaints/Reason for Referral:  Yadiel Charles was referred for a MBS to assess the efficiency of his/her swallow function, assess for aspiration, and to make recommendations regarding safe dietary consistencies, effective compensatory strategies, and safe eating environment. Patient complaints: Pt denies difficulty swallowing, however has been coughing with PO, and is believed to have aspiration pneumonia. Onset of problem:   Date of Onset: 2022    General Comment  Comments: Per admitting H&P (2022): 'Patient is a poor historian at this time, and information for this report is derived from conversation with the emergency room physician, review of the records and from conversations with patient's family at bedside. Pt is an 80y.o. year-old male who was admitted to [facility] 1 week ago for falls and increased weakness.   He was found to have COVID-19 but had no symptoms other than increased weakness. While there he failed a swallow evaluation and was advised to follow a dysphagia diet with thickened liquids. He was discharged yesterday to [facility] for physical therapy rehab. Apparently the recommended diet was not followed and he aspirated. He developed shortness of breath and an x-ray was done which was consistent with aspiration. On evaluation in the emergency room he was found to have a new oxygen requirement associated with aspiration pneumonia. He is being admitted for further evaluation and treatment. Associated signs and symptoms do not include fever or chills, nausea, vomiting, abdominal pain, hemoptysis, hematochezia, diarrhea, constipation or urinary symptoms.'  Subjective  Subjective: Received pt awake, alert, resting in bed, on 2L O2 via nc. Behavior/Cognition/Vision/Hearing:  Behavior/Cognition: Alert; Cooperative;Pleasant mood  Hearing: Exceptions to [facility] Exceptions: Hard of hearing/hearing concerns    Impressions:  Oral phase: Mild oral dysphagia characterized by slowed mastication, slowed A-P transit, and reduced posterior oral containment with premature loss of bolus. Pharyngeal phase: Moderate pharyngeal dysphagia characterized by impaired sensation, timing, and hyolaryngeal mechanics resulting in premature spillage of liquids, delayed and reduced anterior laryngeal movement and epiglottic retraction, with compromised airway protection. Resulted in deep penetration and subsequent tracheal aspiration (intermittently silent) of thin and nectar thick liquids. Aspiration occurred both during and after swallow; chin tuck did not prevent, and cough did not clear. No aspiration visualized for honey thick liquids, puree, or soft solid. Reduced tongue base retraction and pharyngeal constriction resulted in mild diffuse vallecular/pyriform stasis; cleared with subsequent swallows.     Upper Esophageal Screen: Reduced cricopharyngeal opening in setting of reduced anterior hyolaryngeal movement. Dysphagia Outcome Severity Scale: Level 3: Moderate dysphagia- Total assisstance, supervision or strategies. Two or more diet consistencies restricted  Penetration-Aspiration Scale (PAS): 8 - Material Enters the airway, passes below the vocal folds, and no effort is made to eject    Treatment Dx and ICD 10: dysphagia  Patient Position: Lateral and upright    Consistencies Administered: Pureed;Minced and Moist;Thin cup; Thin teaspoon; Thin straw; Moderately Thick cup;Moderately Thickteaspoon; Moderately Thickstraw;Mildly Thick cup;Mildly Thick teaspoon;Mildly Thick straw      Recommended Diet:  Solid consistency: Minced and Moist  Liquid consistency: Moderately Thick  Liquid administration via: Cup, no straws    Medication administration: Meds in puree    Safe Swallow Protocol:  Supervision: Close  Compensatory Swallowing Strategies : Upright as possible for all oral intake;Eat/Feed slowly; Alternate solids and liquids;Swallow 2 times per bite/sip;Effortful swallow; Set up assist;External pacing;Small bites/sips      Recommendations/Treatment  Requires SLP Intervention: Yes        D/C Recommendations: Ongoing speech therapy is recommended during this hospitalization  Postural Changes and/or Swallow Maneuvers: Upright 90 degrees      Recommended Exercises:    Therapeutic Interventions: Oral care;Diet tolerance monitoring;Patient/Family education, effortful swallow    Education: Images and recommendations were reviewed with the patient following this exam.   Patient Education: Educated pt to purpose of visit, swallow function, recommendations, strategies.   Patient Education Response: Verbalizes understanding;Needs reinforcement    Duration/Frequency of Treatment  Duration of Treatment: 1-2 wks or LOS  Frequency of Treatment: TBD pending MBS  Safety Devices  Safety Devices in place: Yes      Goals:    Goal 1: Patient will participate in further assessment of swallow function as appropriate. Goal 2: Patient/caregiver will demonstrate understanding of swallowing concerns/recommendations. Oral Preparation / Oral Phase  slowed mastication, slowed A-P transit, and reduced posterior oral containment with premature loss of bolus    Pharyngeal Phase  premature spillage of liquids, delayed and reduced anterior laryngeal movement and epiglottic retraction, reduced tongue base retraction, reduced pharyngeal constriction    Esophageal Phase  Esophageal Screen: Impaired  Upper Esophageal Screen- Major Contributing Deficits  Major Contributing Deficits: Reduced Cricopharyngeal Opening      Pain      Pain Level: 0  Pain Location: Knee      Therapy Time:   Individual Concurrent Group Co-treatment   Time In 0850         Time Out 0905         Minutes 15            Timed Code Treatment Minutes: 0 Minutes  Total Treatment Time: 21    Plan  Diet Recommendations:     - Dysphagia Minced & Moist Solids, Moderately Thick (Honey) Liquids (no straws), meds in puree    - upright position, small bites/sips, slow rate  - supervision with meals  - oral hygiene x2 daily      Discharge Plan:  TBD  Discussed with RN, Rubens Campa; perfect-served Dr. Mechelle Ma. Needs within reach. Electronically Signed by:  JESSICA Mcarthur [address]   Speech-Language Pathologist  Pager #[phone]    This document will serve as a discharge summary if pt discharges before next treatment.

## 2022-06-17 NOTE — PROGRESS NOTES
Speech Language Pathology  Facility/Department: [address]   CLINICAL BEDSIDE SWALLOW EVALUATION    NAME: Aure Lopez  : 1935  MRN: [id number]    ADMISSION DATE: 2022  ADMITTING DIAGNOSIS: has Acute aspiration pneumonia ([address] Acute respiratory failure with hypoxia (HCC); COVID-19 viremia - Tested positive 2022; and Generalized weakness on their problem list.  ONSET DATE: 2022    Recent Chest Xray: (2022)  Impression   1. Cardiomegaly and aortic tortuosity   2. Minimal hazy density right lung base, differential radiolucency of the lungs is secondary to patient rotation   3. Hypertrophic osteophytes of the shoulders, left greater than right     CT of Chest: (2022)  Impression   1. Right lower lobe bronchovascular crowding and airspace disease, small pneumonia with adjacent pleural thickening   2. Large hiatal hernia   3. Cardiomegaly with coronary artery calcification   4. Vertebral body fracture of L1 is age indeterminate. Date of Eval: 2022  Evaluating Therapist: FABIÁN Reece    Current Diet level:  Current Diet : NPO      Primary Complaint  Patient Complaint: Pt denies difficulty swallowing, however has been admitted with aspiration pneumonia. Pain:  Pain Assessment  Pain Assessment: 0-10  Pain Level: 0  Pain Location: Knee  Pain Orientation: Left    Reason for Referral  Aure Lopez was referred for a bedside swallow evaluation to assess the efficiency of his swallow function, identify signs and symptoms of aspiration and make recommendations regarding safe dietary consistencies, effective compensatory strategies, and safe eating environment. Prior Dysphagia History:  Patient recently admitted to [facility]; per available EMR, pt with bedside swallow evaluation completed there , with recommendations at that time for minced and moist solids and mildly thick (nectar) liquids.  Unable to locate any modified barium swallow. Reportedly, pt with aspiration event yesterday at nursing facility. Pt now with suspected aspiration pneumonia. Impression  Dysphagia Diagnosis: Suspected needs further assessment  Dysphagia Impression : Needs further assessment. Oral phase dysphagia, with suspected pharyngeal dysphagia. Oral motor exam grossly intact. With pt seated up in bed, on 2 L O2 via nc, upper dentures in place, assessed tolerance ice chips, thins via tsp/straw/cup edge, puree, soft solid. Pt with positive oral acceptance, slowed oral prep, positive swallow movement, good oral clearance. Inconsistent dry cough. Recommend completion of MBS to further assess pharyngeal function and better determine diet recommendations. Dysphagia Outcome Severity Scale: Level 1: Severe dysphagia- NPO. Unable to tolerate any PO safely     Treatment Plan  Requires SLP Intervention: Yes  Duration of Treatment: 1-2 wks or LOS  D/C Recommendations: Ongoing speech therapy is recommended during this hospitalization       Recommended Diet and Intervention  TBD pending MBS (planned for today)  Can do ice chips  Recommended Form of Meds: Crushed in puree as able  Recommendations: NPO;Modified barium swallow study; Dysphagia treatment;Consider ice chips PRN  Therapeutic Interventions: Oral care;Diet tolerance monitoring;Patient/Family education    Compensatory Swallowing Strategies  Compensatory Swallowing Strategies : Upright as possible for all oral intake;Eat/Feed slowly; Small bites/sips    Treatment/Goals  Short-term Goals  Timeframe for Short-term Goals: 1-2 wks or LOS  Goal 1: Patient will participate in further assessment of swallow function as appropriate. Goal 2: Patient/caregiver will demonstrate understanding of swallowing concerns/recommendations.     General  Chart Reviewed: Yes  Comments: Per admitting H&P (06/16/2022): 'Patient is a poor historian at this time, and information for this report is derived from conversation with the emergency room physician, review of the records and from conversations with patient's family at bedside. Pt is an 80y.o. year-old male who was admitted to [facility] 1 week ago for falls and increased weakness. He was found to have COVID-19 but had no symptoms other than increased weakness. While there he failed a swallow evaluation and was advised to follow a dysphagia diet with thickened liquids. He was discharged yesterday to [facility] for physical therapy rehab. Apparently the recommended diet was not followed and he aspirated. He developed shortness of breath and an x-ray was done which was consistent with aspiration. On evaluation in the emergency room he was found to have a new oxygen requirement associated with aspiration pneumonia. He is being admitted for further evaluation and treatment. Associated signs and symptoms do not include fever or chills, nausea, vomiting, abdominal pain, hemoptysis, hematochezia, diarrhea, constipation or urinary symptoms.'  Subjective  Subjective: Received pt awake, alert, pleasant, resting in bed, on 2L O2 via nc. Behavior/Cognition: Alert; Cooperative;Pleasant mood  Respiratory Status: O2 via nasual cannula  O2 Device: Nasal cannula  Liters of Oxygen: 2 L  Communication Observation: Functional  Follows Directions: Simple  Dentition: Dentures top  Patient Positioning: Upright in bed  Baseline Vocal Quality: Normal  Volitional Cough: Strong    Vision/Hearing  Hearing  Hearing: Exceptions to [facility]  Hearing Exceptions: Hard of hearing/hearing concerns    Oral Motor Deficits  Oral/Motor  Oral Hygiene: Clean    Oral Phase Dysfunction  Slowed oral prep     Indicators of Pharyngeal Phase Dysfunction  Inconsistent dry cough    Prognosis  Fair    Education  Patient Education: Educated pt to purpose of visit, swallow function, recommendations, strategies.   Patient Education Response: Verbalizes understanding             Therapy Time  SLP Individual Minutes  Time In: 7418  Time Out: 4011  Minutes: 15     SLP Total Treatment Time  Timed Code Treatment Minutes: 0 Minutes  Total Treatment Time: 15    Plan  Diet Recommendations: TBD pending MBS (planned for today)    Discharge Plan:  TBD  Discussed with RN, Henny Vera. Needs within reach. Electronically Signed by:  Triny Josue M.A., Earl Manzo   Speech-Language Pathologist  Pager #[phone]    This document will serve as a discharge summary if pt discharges before next treatment.

## 2022-06-18 LAB
ANION GAP SERPL CALCULATED.3IONS-SCNC: 12 MMOL/L (ref 3–16)
BASOPHILS ABSOLUTE: 0 K/UL (ref 0–0.2)
BASOPHILS RELATIVE PERCENT: 0.5 %
BUN BLDV-MCNC: 34 MG/DL (ref 7–20)
CALCIUM SERPL-MCNC: 7.9 MG/DL (ref 8.3–10.6)
CHLORIDE BLD-SCNC: 111 MMOL/L (ref 99–110)
CO2: 18 MMOL/L (ref 21–32)
CREAT SERPL-MCNC: 1.6 MG/DL (ref 0.8–1.3)
EOSINOPHILS ABSOLUTE: 0.1 K/UL (ref 0–0.6)
EOSINOPHILS RELATIVE PERCENT: 0.8 %
GFR AFRICAN AMERICAN: 50
GFR NON-AFRICAN AMERICAN: 41
GLUCOSE BLD-MCNC: 100 MG/DL (ref 70–99)
HCT VFR BLD CALC: 38.4 % (ref 40.5–52.5)
HEMOGLOBIN: 12.9 G/DL (ref 13.5–17.5)
LYMPHOCYTES ABSOLUTE: 0.5 K/UL (ref 1–5.1)
LYMPHOCYTES RELATIVE PERCENT: 5.5 %
MAGNESIUM: 1.9 MG/DL (ref 1.8–2.4)
MCH RBC QN AUTO: 29.2 PG (ref 26–34)
MCHC RBC AUTO-ENTMCNC: 33.6 G/DL (ref 31–36)
MCV RBC AUTO: 86.8 FL (ref 80–100)
MONOCYTES ABSOLUTE: 0.8 K/UL (ref 0–1.3)
MONOCYTES RELATIVE PERCENT: 8.7 %
NEUTROPHILS ABSOLUTE: 7.7 K/UL (ref 1.7–7.7)
NEUTROPHILS RELATIVE PERCENT: 84.5 %
PDW BLD-RTO: 15 % (ref 12.4–15.4)
PLATELET # BLD: 223 K/UL (ref 135–450)
PMV BLD AUTO: 7.9 FL (ref 5–10.5)
POTASSIUM REFLEX MAGNESIUM: 3.3 MMOL/L (ref 3.5–5.1)
RBC # BLD: 4.42 M/UL (ref 4.2–5.9)
SARS-COV-2, NAAT: NOT DETECTED
SODIUM BLD-SCNC: 141 MMOL/L (ref 136–145)
WBC # BLD: 9.1 K/UL (ref 4–11)

## 2022-06-18 PROCEDURE — 2580000003 HC RX 258: Performed by: INTERNAL MEDICINE

## 2022-06-18 PROCEDURE — 85025 COMPLETE CBC W/AUTO DIFF WBC: CPT

## 2022-06-18 PROCEDURE — 6370000000 HC RX 637 (ALT 250 FOR IP): Performed by: INTERNAL MEDICINE

## 2022-06-18 PROCEDURE — 1200000000 HC SEMI PRIVATE

## 2022-06-18 PROCEDURE — 87635 SARS-COV-2 COVID-19 AMP PRB: CPT

## 2022-06-18 PROCEDURE — 94761 N-INVAS EAR/PLS OXIMETRY MLT: CPT

## 2022-06-18 PROCEDURE — 80048 BASIC METABOLIC PNL TOTAL CA: CPT

## 2022-06-18 PROCEDURE — 2700000000 HC OXYGEN THERAPY PER DAY

## 2022-06-18 PROCEDURE — 6360000002 HC RX W HCPCS: Performed by: INTERNAL MEDICINE

## 2022-06-18 PROCEDURE — 36415 COLL VENOUS BLD VENIPUNCTURE: CPT

## 2022-06-18 PROCEDURE — 83735 ASSAY OF MAGNESIUM: CPT

## 2022-06-18 RX ORDER — POTASSIUM CHLORIDE 20 MEQ/1
40 TABLET, EXTENDED RELEASE ORAL ONCE
Status: DISCONTINUED | OUTPATIENT
Start: 2022-06-18 | End: 2022-06-18 | Stop reason: SDUPTHER

## 2022-06-18 RX ORDER — MECOBALAMIN 5000 MCG
5 TABLET,DISINTEGRATING ORAL NIGHTLY
Status: DISCONTINUED | OUTPATIENT
Start: 2022-06-18 | End: 2022-07-05 | Stop reason: HOSPADM

## 2022-06-18 RX ADMIN — POTASSIUM BICARBONATE 40 MEQ: 782 TABLET, EFFERVESCENT ORAL at 11:56

## 2022-06-18 RX ADMIN — ENOXAPARIN SODIUM 40 MG: 100 INJECTION SUBCUTANEOUS at 10:26

## 2022-06-18 RX ADMIN — ACETAMINOPHEN 650 MG: 325 TABLET ORAL at 17:47

## 2022-06-18 RX ADMIN — SODIUM CHLORIDE 3000 MG: 900 INJECTION INTRAVENOUS at 17:06

## 2022-06-18 RX ADMIN — SODIUM CHLORIDE 3000 MG: 900 INJECTION INTRAVENOUS at 05:36

## 2022-06-18 RX ADMIN — ZIPRASIDONE MESYLATE 10 MG: 20 INJECTION, POWDER, LYOPHILIZED, FOR SOLUTION INTRAMUSCULAR at 00:14

## 2022-06-18 RX ADMIN — SODIUM CHLORIDE 3000 MG: 900 INJECTION INTRAVENOUS at 10:33

## 2022-06-18 RX ADMIN — Medication 5 MG: at 20:40

## 2022-06-18 RX ADMIN — SODIUM CHLORIDE, PRESERVATIVE FREE 10 ML: 5 INJECTION INTRAVENOUS at 20:40

## 2022-06-18 ASSESSMENT — PAIN SCALES - GENERAL: PAINLEVEL_OUTOF10: 4

## 2022-06-18 NOTE — PROGRESS NOTES
Hospitalist Progress Note      PCP: Kayce Pappas MD    Date of Admission: 6/16/2022    Chief Complaint: Aspiration Pneumonia    Hospital Course: H&P    Subjective: Seen and examined at bedside. Awake, alert. Afebrile. On 2L NC. Medications:  Reviewed    Infusion Medications    sodium chloride      sodium chloride 75 mL/hr at 06/17/22 1433     Scheduled Medications    ampicillin-sulbactam  3,000 mg IntraVENous Q6H    sodium chloride flush  10 mL IntraVENous 2 times per day    enoxaparin  40 mg SubCUTAneous Daily     PRN Meds: sodium chloride flush, sodium chloride, ondansetron, polyethylene glycol, acetaminophen **OR** acetaminophen      Intake/Output Summary (Last 24 hours) at 6/17/2022 2244  Last data filed at 6/17/2022 1747  Gross per 24 hour   Intake 2265 ml   Output 600 ml   Net 1665 ml       Physical Exam Performed:    /80   Pulse 88   Temp 97.8 °F (36.6 °C) (Oral)   Resp 20   Ht 5' 10\" (1.778 m)   Wt 156 lb (70.8 kg)   SpO2 96%   BMI 22.38 kg/m²     General appearance: No apparent distress, appears stated age and cooperative. Frail, thin  HEENT: Pupils equal, round, and reactive to light. Conjunctivae/corneas clear. Neck: Supple, with full range of motion. No jugular venous distention. Trachea midline. Respiratory:  Normal respiratory effort. Clear to auscultation, bilaterally without Rales/Wheezes/Rhonchi. Cardiovascular: Regular rate and rhythm with normal S1/S2 without murmurs, rubs or gallops. Abdomen: Soft, non-tender, non-distended with normal bowel sounds. Musculoskeletal: No clubbing, cyanosis or edema bilaterally. Full range of motion without deformity. Skin: Skin color, texture, turgor normal.  No rashes or lesions. Neurologic:  Neurovascularly intact without any focal sensory/motor deficits.  Cranial nerves: II-XII intact, grossly non-focal.  Psychiatric: Alert and awake  Capillary Refill: Brisk,3 seconds, normal   Peripheral Pulses: +2 palpable, equal bilaterally       Labs:   Recent Labs     06/16/22  1550 06/17/22  0635   WBC 10.7 9.9   HGB 15.3 13.0*   HCT 46.1 38.6*    223     Recent Labs     06/16/22  1550 06/17/22  0635    140   K 4.8 4.4    108   CO2 19* 18*   BUN 46* 45*   CREATININE 1.7* 1.7*   CALCIUM 9.2 8.5     No results for input(s): AST, ALT, BILIDIR, BILITOT, ALKPHOS in the last 72 hours. No results for input(s): INR in the last 72 hours. Recent Labs     06/16/22  1550   TROPONINI <0.01       Urinalysis:    No results found for: Cortez Scarce, BACTERIA, RBCUA, BLOODU, SPECGRAV, GLUCOSEU    Radiology:  FL MODIFIED BARIUM SWALLOW W VIDEO   Final Result      1. Multilevel episodes of tracheal aspiration as detailed above. CT CHEST WO CONTRAST   Final Result   1. Right lower lobe bronchovascular crowding and airspace disease, small pneumonia with adjacent pleural thickening   2. Large hiatal hernia   3. Cardiomegaly with coronary artery calcification   4. Vertebral body fracture of L1 is age indeterminate. XR CHEST PORTABLE   Final Result   1. Cardiomegaly and aortic tortuosity   2. Minimal hazy density right lung base, differential radiolucency of the lungs is secondary to patient rotation   3.  Hypertrophic osteophytes of the shoulders, left greater than right              Assessment/Plan:    Active Hospital Problems    Diagnosis     Acute aspiration pneumonia ([address] [J69.0]      Priority: Medium    Acute respiratory failure with hypoxia (HCC) [J96.01]      Priority: Medium    COVID-19 viremia - Tested positive 06/09/2022 [U07.1]      Priority: Medium    Generalized weakness [R53.1]      Priority: Medium     Pneumonia, due to aspiration - Pt has been started on Unasyn  - Pt tested positive for the COVID-19 virus in 06/09/2022, and dos not appear to have COVID pneumonia  - Respiratory culture ordered  - Legionella pneumophilia and Strep pneumoniae urine antigens ordered     COVID-19 viremia - Tested positive 06/09/2022 - Provide symptomatic treatment     Acute respiratory failure with hypoxia (HCC) - due to aspiration pneumonia, not COVID. As evidenced by an oxygen saturation of less than 90% on room air. We will provide oxygen as needed to maintain an oxygen saturation of 92% or higher.     Aspiration   -Speech evaluated. -Recommend dysphagia minced&Moist solids, moderately Thick      Generalized weakness   - PT/OT to evaluate and treat patient, and if necessary to provide recommendations for post hospital therapy     Acute metabolic encephalopathy -we will provide supportive care    MARY vs CKD -improving  -Cr 2.45 on 6/11  -Continue IVF    DVT Prophylaxis: Lovenox  Diet: ADULT DIET; Dysphagia - Minced and Moist; Moderately Thick (Honey);  No Drinking Straws  Code Status: Full Code    PT/OT Eval Status: ordered    Dispo - inpatient pending clinical improvement    King Alpa MD

## 2022-06-18 NOTE — PLAN OF CARE
Problem: ABCDS Injury Assessment  Goal: Absence of physical injury  Outcome: Progressing     Problem: Pain  Goal: Verbalizes/displays adequate comfort level or baseline comfort level  Outcome: Progressing     Problem: Safety - Adult  Goal: Free from fall injury  Outcome: Progressing

## 2022-06-19 LAB
ANION GAP SERPL CALCULATED.3IONS-SCNC: 12 MMOL/L (ref 3–16)
BASOPHILS ABSOLUTE: 0 K/UL (ref 0–0.2)
BASOPHILS RELATIVE PERCENT: 0.2 %
BUN BLDV-MCNC: 25 MG/DL (ref 7–20)
CALCIUM SERPL-MCNC: 8 MG/DL (ref 8.3–10.6)
CHLORIDE BLD-SCNC: 112 MMOL/L (ref 99–110)
CO2: 20 MMOL/L (ref 21–32)
CREAT SERPL-MCNC: 1.7 MG/DL (ref 0.8–1.3)
EOSINOPHILS ABSOLUTE: 0.1 K/UL (ref 0–0.6)
EOSINOPHILS RELATIVE PERCENT: 1.2 %
GFR AFRICAN AMERICAN: 46
GFR NON-AFRICAN AMERICAN: 38
GLUCOSE BLD-MCNC: 122 MG/DL (ref 70–99)
HCT VFR BLD CALC: 36.4 % (ref 40.5–52.5)
HEMOGLOBIN: 11.9 G/DL (ref 13.5–17.5)
LYMPHOCYTES ABSOLUTE: 0.3 K/UL (ref 1–5.1)
LYMPHOCYTES RELATIVE PERCENT: 4.4 %
MCH RBC QN AUTO: 28.2 PG (ref 26–34)
MCHC RBC AUTO-ENTMCNC: 32.7 G/DL (ref 31–36)
MCV RBC AUTO: 86.1 FL (ref 80–100)
MONOCYTES ABSOLUTE: 0.4 K/UL (ref 0–1.3)
MONOCYTES RELATIVE PERCENT: 5.4 %
NEUTROPHILS ABSOLUTE: 6.8 K/UL (ref 1.7–7.7)
NEUTROPHILS RELATIVE PERCENT: 88.8 %
PDW BLD-RTO: 15.1 % (ref 12.4–15.4)
PLATELET # BLD: 237 K/UL (ref 135–450)
PMV BLD AUTO: 8 FL (ref 5–10.5)
POTASSIUM REFLEX MAGNESIUM: 3.7 MMOL/L (ref 3.5–5.1)
RBC # BLD: 4.22 M/UL (ref 4.2–5.9)
SODIUM BLD-SCNC: 144 MMOL/L (ref 136–145)
WBC # BLD: 7.7 K/UL (ref 4–11)

## 2022-06-19 PROCEDURE — 2700000000 HC OXYGEN THERAPY PER DAY

## 2022-06-19 PROCEDURE — 6370000000 HC RX 637 (ALT 250 FOR IP): Performed by: INTERNAL MEDICINE

## 2022-06-19 PROCEDURE — 2580000003 HC RX 258: Performed by: INTERNAL MEDICINE

## 2022-06-19 PROCEDURE — 80048 BASIC METABOLIC PNL TOTAL CA: CPT

## 2022-06-19 PROCEDURE — 87324 CLOSTRIDIUM AG IA: CPT

## 2022-06-19 PROCEDURE — 36415 COLL VENOUS BLD VENIPUNCTURE: CPT

## 2022-06-19 PROCEDURE — 94761 N-INVAS EAR/PLS OXIMETRY MLT: CPT

## 2022-06-19 PROCEDURE — 85025 COMPLETE CBC W/AUTO DIFF WBC: CPT

## 2022-06-19 PROCEDURE — 1200000000 HC SEMI PRIVATE

## 2022-06-19 PROCEDURE — 6360000002 HC RX W HCPCS: Performed by: INTERNAL MEDICINE

## 2022-06-19 PROCEDURE — 87449 NOS EACH ORGANISM AG IA: CPT

## 2022-06-19 RX ADMIN — Medication 5 MG: at 20:14

## 2022-06-19 RX ADMIN — ENOXAPARIN SODIUM 40 MG: 100 INJECTION SUBCUTANEOUS at 08:42

## 2022-06-19 RX ADMIN — SODIUM CHLORIDE 3000 MG: 900 INJECTION INTRAVENOUS at 00:21

## 2022-06-19 RX ADMIN — SODIUM CHLORIDE 3000 MG: 900 INJECTION INTRAVENOUS at 12:16

## 2022-06-19 RX ADMIN — SODIUM CHLORIDE, PRESERVATIVE FREE 10 ML: 5 INJECTION INTRAVENOUS at 20:14

## 2022-06-19 RX ADMIN — SODIUM CHLORIDE 3000 MG: 900 INJECTION INTRAVENOUS at 17:02

## 2022-06-19 RX ADMIN — SODIUM CHLORIDE 3000 MG: 900 INJECTION INTRAVENOUS at 05:31

## 2022-06-19 ASSESSMENT — PAIN SCALES - GENERAL
PAINLEVEL_OUTOF10: 0
PAINLEVEL_OUTOF10: 0

## 2022-06-19 ASSESSMENT — PAIN SCALES - WONG BAKER
WONGBAKER_NUMERICALRESPONSE: 0
WONGBAKER_NUMERICALRESPONSE: 0

## 2022-06-19 NOTE — PROGRESS NOTES
deficits. Cranial nerves: II-XII intact, grossly non-focal.  Psychiatric: Alert and awake  Capillary Refill: Brisk,3 seconds, normal   Peripheral Pulses: +2 palpable, equal bilaterally       Labs:   Recent Labs     06/17/22  0635 06/18/22  0923 06/19/22  1444   WBC 9.9 9.1 7.7   HGB 13.0* 12.9* 11.9*   HCT 38.6* 38.4* 36.4*    223 237     Recent Labs     06/17/22  0635 06/18/22  0923 06/19/22  1444    141 144   K 4.4 3.3* 3.7    111* 112*   CO2 18* 18* 20*   BUN 45* 34* 25*   CREATININE 1.7* 1.6* 1.7*   CALCIUM 8.5 7.9* 8.0*     No results for input(s): AST, ALT, BILIDIR, BILITOT, ALKPHOS in the last 72 hours. No results for input(s): INR in the last 72 hours. No results for input(s): Ashley Ill in the last 72 hours. Urinalysis:    No results found for: Yash Collar, BACTERIA, RBCUA, BLOODU, SPECGRAV, [address] [place] [address]    Radiology:  FL MODIFIED BARIUM SWALLOW W VIDEO   Final Result      1. Multilevel episodes of tracheal aspiration as detailed above. CT CHEST WO CONTRAST   Final Result   1. Right lower lobe bronchovascular crowding and airspace disease, small pneumonia with adjacent pleural thickening   2. Large hiatal hernia   3. Cardiomegaly with coronary artery calcification   4. Vertebral body fracture of L1 is age indeterminate. XR CHEST PORTABLE   Final Result   1. Cardiomegaly and aortic tortuosity   2. Minimal hazy density right lung base, differential radiolucency of the lungs is secondary to patient rotation   3.  Hypertrophic osteophytes of the shoulders, left greater than right              Assessment/Plan:    Active Hospital Problems    Diagnosis     Acute aspiration pneumonia ([address] [J69.0]      Priority: Medium    Acute respiratory failure with hypoxia ([address] [J96.01]      Priority: Medium    COVID-19 viremia - Tested positive 06/09/2022 [U07.1]      Priority: Medium    Generalized weakness [R53.1]      Priority: Medium     Pneumonia, due to aspiration - Pt has been started on Unasyn  - Pt tested positive for the COVID-19 virus in 06/09/2022, and dos not appear to have COVID pneumonia  - Respiratory culture ordered  - Legionella pneumophilia and Strep pneumoniae urine antigens ordered     COVID-19 viremia - Tested positive 06/09/2022 - Provide symptomatic treatment     Acute respiratory failure with hypoxia (HCC) - due to aspiration pneumonia, not COVID. As evidenced by an oxygen saturation of less than 90% on room air. We will provide oxygen as needed to maintain an oxygen saturation of 92% or higher.     Aspiration   -Speech evaluated. -Recommend dysphagia minced&Moist solids, moderately Thick  MBS shows multilevel aspiration     Generalized weakness   - PT/OT to evaluate and treat patient, and if necessary to provide recommendations for post hospital therapy     Acute metabolic encephalopathy -we will provide supportive care    MARY vs CKD -improving  -Cr 2.45 on 6/11  -Continue IVF    DVT Prophylaxis: Lovenox  Diet: ADULT DIET; Dysphagia - Minced and Moist; Moderately Thick (Honey);  No Drinking Straws  Code Status: Full Code    PT/OT Eval Status: ordered    Dispo/plan- continue IV Unasyn, consulted GI for dysphagia, patient is full code, will consult palliative care    Isis Christianson MD

## 2022-06-19 NOTE — PLAN OF CARE
Problem: ABCDS Injury Assessment  Goal: Absence of physical injury  6/19/2022 0151 by Nadine Marr RN  Outcome: Progressing  6/18/2022 1835 by Carmelita Post RN  Outcome: Progressing     Problem: Respiratory - Adult  Goal: Achieves optimal ventilation and oxygenation  6/19/2022 0151 by Nadine Marr RN  Outcome: Progressing  6/18/2022 1835 by Carmelita Post RN  Outcome: Progressing     Problem: Discharge Planning  Goal: Discharge to home or other facility with appropriate resources  6/19/2022 0151 by Nadine Marr RN  Outcome: Progressing  6/18/2022 1835 by Carmelita Post RN  Outcome: Progressing     Problem: Pain  Goal: Verbalizes/displays adequate comfort level or baseline comfort level  6/19/2022 0151 by Nadine Marr RN  Outcome: Progressing  6/18/2022 [address] by Carmelita Post RN  Outcome: Progressing     Problem: Safety - Adult  Goal: Free from fall injury  6/19/2022 0151 by Nadine Marr RN  Outcome: Progressing  6/18/2022 1835 by Carmelita Post RN  Outcome: Progressing     Problem: Skin/Tissue Integrity  Goal: Absence of new skin breakdown  Description: 1. Monitor for areas of redness and/or skin breakdown  2. Assess vascular access sites hourly  3. Every 4-6 hours minimum:  Change oxygen saturation probe site  4. Every 4-6 hours:  If on nasal continuous positive airway pressure, respiratory therapy assess nares and determine need for appliance change or resting period.   6/19/2022 0151 by Nadine Marr RN  Outcome: Progressing  6/18/2022 1835 by Carmelita Post RN  Outcome: Progressing

## 2022-06-19 NOTE — CARE COORDINATION
Case Management Assessment           Initial Evaluation                Date / Time of Evaluation: 6/19/2022 4:45 PM                 Assessment Completed by: EDUARD Sotelo, JAKOBW    Patient Name: Zbigniew Steele     YOB: 1935  Diagnosis: Aspiration into airway, initial encounter [T17.908A]  Acute aspiration pneumonia [facility]) [J69.0]     Date / Time: 6/16/2022  3:05 PM    Patient Admission Status: Inpatient    If patient is discharged prior to next notation, then this note serves as note for discharge by case management. Current PCP: Ciaran Dong MD  Clinic Patient: No    Chart Reviewed: Yes  Patient/ Family Interviewed: Yes    Initial assessment completed at bedside with:     Hospitalization in the last 30 days: Yes    Emergency Contacts:  Extended Emergency Contact Information  Primary Emergency Contact: García Mills  Mobile Phone: [phone]  Relation: Child  Secondary Emergency Contact: Sherrill Mendez  Mobile Phone: [phone]  Relation: Child    Advance Directives:   Code Status: Full Code    Healthcare Power of : No    Financial  Payor: Shaheen Mathias / Plan: Elizabeth Shin PPO / Product Type: Medicare /     Pre-cert required for SNF: Yes    Pharmacy    [facility] [id number] [facility] [address], [address]  [address]  [address] [address]  Phone: [phone] Fax: [phone]      Potential assistance Purchasing Medications: Potential Assistance Purchasing Medications: Yes  Does Patient want to participate in local refill/ meds to beds program?:      Meds To Beds General Rules:  1. Can ONLY be done Monday- Friday between 8:30am-5pm  2. Prescription(s) must be in pharmacy by 3pm to be filled same day  3. Copy of patient's insurance/ prescription drug card and patient face sheet must be sent along with the prescription(s)  4. Cost of Rx cannot be added to hospital bill.  If financial assistance is needed, please contact unit  or ;  or  CANNOT provide pharmacy voucher for patients co-pays  5. Patients can then  the prescription on their way out of the hospital at discharge, or pharmacy can deliver to the bedside if staff is available. (payment due at time of pick-up or delivery - cash, check, or card accepted)     Able to afford home medications/ co-pay costs: Yes    ADLS  Support Systems: [facility] Staff,Friends/Neighbors    PT AM-PAC:   /24  OT AM-PAC: 10 /24    [place]: home alone (currently in SNF)  Steps: none    Plans to RETURN to current housing: No  Barriers to RETURNING to current housing: needs to complete rehab at [facility] hospital      [address]: cane    Home Oxygen and [address] prior to admission: No    Dialysis  Active with HD/PD prior to admission: No      DISCHARGE PLAN:  Disposition: Located within [facility] (SNF): Donna Chavarria Phone: [phone] Fax: [phone]    Transportation PLAN for discharge: EMS transportation     Factors facilitating achievement of predicted outcomes: Family support    Barriers to discharge: Medical complications    Additional Case Management Notes:  Pt DC'd from [facility] to [address] [place] [address] yesterday, will likely return to SNF to complete rehab. Pt was living at home alone, reported that his children check in (from [address] [place] [address]). Pt not on home O2, currently on 3L while in hospital.     CM will call SNF during business hours to confirm pt returning at DC.         The Plan for Transition of Care is related to the following treatment goals of Aspiration into airway, initial encounter [T17.8A]  Acute aspiration pneumonia ([facility] [address] [J69.0]    The Patient and/or patient representative Prateek Sexton and his family were provided with a choice of provider and agrees with the discharge plan Yes    Freedom of choice list was provided with basic dialogue that

## 2022-06-20 ENCOUNTER — ANESTHESIA (OUTPATIENT)
Dept: ENDOSCOPY | Age: 87
DRG: 177 | End: 2022-06-20
Payer: MEDICARE

## 2022-06-20 ENCOUNTER — ANESTHESIA EVENT (OUTPATIENT)
Dept: ENDOSCOPY | Age: 87
DRG: 177 | End: 2022-06-20
Payer: MEDICARE

## 2022-06-20 LAB
ANION GAP SERPL CALCULATED.3IONS-SCNC: 11 MMOL/L (ref 3–16)
BASOPHILS ABSOLUTE: 0 K/UL (ref 0–0.2)
BASOPHILS RELATIVE PERCENT: 0 %
BILIRUBIN URINE: NEGATIVE
BLOOD, URINE: ABNORMAL
BUN BLDV-MCNC: 21 MG/DL (ref 7–20)
C DIFF TOXIN/ANTIGEN: NORMAL
CALCIUM SERPL-MCNC: 7.9 MG/DL (ref 8.3–10.6)
CHLORIDE BLD-SCNC: 110 MMOL/L (ref 99–110)
CLARITY: CLEAR
CO2: 21 MMOL/L (ref 21–32)
COLOR: YELLOW
CREAT SERPL-MCNC: 1.6 MG/DL (ref 0.8–1.3)
CREATININE URINE: 100.2 MG/DL (ref 39–259)
EOSINOPHILS ABSOLUTE: 0 K/UL (ref 0–0.6)
EOSINOPHILS RELATIVE PERCENT: 0 %
GFR AFRICAN AMERICAN: 50
GFR NON-AFRICAN AMERICAN: 41
GLUCOSE BLD-MCNC: 83 MG/DL (ref 70–99)
GLUCOSE URINE: NEGATIVE MG/DL
HCT VFR BLD CALC: 36.8 % (ref 40.5–52.5)
HEMOGLOBIN: 12.2 G/DL (ref 13.5–17.5)
KETONES, URINE: ABNORMAL MG/DL
LEUKOCYTE ESTERASE, URINE: NEGATIVE
LYMPHOCYTES ABSOLUTE: 0.5 K/UL (ref 1–5.1)
LYMPHOCYTES RELATIVE PERCENT: 7 %
MAGNESIUM: 1.7 MG/DL (ref 1.8–2.4)
MCH RBC QN AUTO: 29.1 PG (ref 26–34)
MCHC RBC AUTO-ENTMCNC: 33.3 G/DL (ref 31–36)
MCV RBC AUTO: 87.5 FL (ref 80–100)
MICROSCOPIC EXAMINATION: YES
MONOCYTES ABSOLUTE: 0.8 K/UL (ref 0–1.3)
MONOCYTES RELATIVE PERCENT: 10 %
NEUTROPHILS ABSOLUTE: 6.5 K/UL (ref 1.7–7.7)
NEUTROPHILS RELATIVE PERCENT: 83 %
NITRITE, URINE: NEGATIVE
PDW BLD-RTO: 14.9 % (ref 12.4–15.4)
PH UA: 6 (ref 5–8)
PLATELET # BLD: ABNORMAL K/UL (ref 135–450)
PMV BLD AUTO: ABNORMAL FL (ref 5–10.5)
POTASSIUM REFLEX MAGNESIUM: 3.4 MMOL/L (ref 3.5–5.1)
PROTEIN PROTEIN: 83 MG/DL
PROTEIN UA: 30 MG/DL
PROTEIN/CREAT RATIO: 0.8 MG/DL
RBC # BLD: 4.21 M/UL (ref 4.2–5.9)
RBC UA: ABNORMAL /HPF (ref 0–4)
SODIUM BLD-SCNC: 142 MMOL/L (ref 136–145)
SPECIFIC GRAVITY UA: 1.02 (ref 1–1.03)
URINE TYPE: ABNORMAL
UROBILINOGEN, URINE: 0.2 E.U./DL
WBC # BLD: 7.8 K/UL (ref 4–11)
WBC UA: ABNORMAL /HPF (ref 0–5)

## 2022-06-20 PROCEDURE — 83735 ASSAY OF MAGNESIUM: CPT

## 2022-06-20 PROCEDURE — 2709999900 HC NON-CHARGEABLE SUPPLY: Performed by: INTERNAL MEDICINE

## 2022-06-20 PROCEDURE — 3609012400 HC EGD TRANSORAL BIOPSY SINGLE/MULTIPLE: Performed by: INTERNAL MEDICINE

## 2022-06-20 PROCEDURE — 80048 BASIC METABOLIC PNL TOTAL CA: CPT

## 2022-06-20 PROCEDURE — 97530 THERAPEUTIC ACTIVITIES: CPT

## 2022-06-20 PROCEDURE — 85025 COMPLETE CBC W/AUTO DIFF WBC: CPT

## 2022-06-20 PROCEDURE — 6360000002 HC RX W HCPCS: Performed by: INTERNAL MEDICINE

## 2022-06-20 PROCEDURE — 6360000002 HC RX W HCPCS: Performed by: NURSE ANESTHETIST, CERTIFIED REGISTERED

## 2022-06-20 PROCEDURE — 94761 N-INVAS EAR/PLS OXIMETRY MLT: CPT

## 2022-06-20 PROCEDURE — 84165 PROTEIN E-PHORESIS SERUM: CPT

## 2022-06-20 PROCEDURE — 7100000011 HC PHASE II RECOVERY - ADDTL 15 MIN: Performed by: INTERNAL MEDICINE

## 2022-06-20 PROCEDURE — 88305 TISSUE EXAM BY PATHOLOGIST: CPT

## 2022-06-20 PROCEDURE — 84156 ASSAY OF PROTEIN URINE: CPT

## 2022-06-20 PROCEDURE — 51702 INSERT TEMP BLADDER CATH: CPT

## 2022-06-20 PROCEDURE — 6370000000 HC RX 637 (ALT 250 FOR IP): Performed by: INTERNAL MEDICINE

## 2022-06-20 PROCEDURE — 2700000000 HC OXYGEN THERAPY PER DAY

## 2022-06-20 PROCEDURE — 82570 ASSAY OF URINE CREATININE: CPT

## 2022-06-20 PROCEDURE — 36415 COLL VENOUS BLD VENIPUNCTURE: CPT

## 2022-06-20 PROCEDURE — 2580000003 HC RX 258: Performed by: INTERNAL MEDICINE

## 2022-06-20 PROCEDURE — 99221 1ST HOSP IP/OBS SF/LOW 40: CPT | Performed by: NURSE PRACTITIONER

## 2022-06-20 PROCEDURE — 0DB58ZX EXCISION OF ESOPHAGUS, VIA NATURAL OR ARTIFICIAL OPENING ENDOSCOPIC, DIAGNOSTIC: ICD-10-PCS | Performed by: INTERNAL MEDICINE

## 2022-06-20 PROCEDURE — 81001 URINALYSIS AUTO W/SCOPE: CPT

## 2022-06-20 PROCEDURE — 3700000000 HC ANESTHESIA ATTENDED CARE: Performed by: INTERNAL MEDICINE

## 2022-06-20 PROCEDURE — 84155 ASSAY OF PROTEIN SERUM: CPT

## 2022-06-20 PROCEDURE — 97162 PT EVAL MOD COMPLEX 30 MIN: CPT

## 2022-06-20 PROCEDURE — 1200000000 HC SEMI PRIVATE

## 2022-06-20 PROCEDURE — 7100000010 HC PHASE II RECOVERY - FIRST 15 MIN: Performed by: INTERNAL MEDICINE

## 2022-06-20 PROCEDURE — 3700000001 HC ADD 15 MINUTES (ANESTHESIA): Performed by: INTERNAL MEDICINE

## 2022-06-20 PROCEDURE — C9113 INJ PANTOPRAZOLE SODIUM, VIA: HCPCS | Performed by: INTERNAL MEDICINE

## 2022-06-20 PROCEDURE — 83883 ASSAY NEPHELOMETRY NOT SPEC: CPT

## 2022-06-20 RX ORDER — PANTOPRAZOLE SODIUM 40 MG/10ML
40 INJECTION, POWDER, LYOPHILIZED, FOR SOLUTION INTRAVENOUS 2 TIMES DAILY
Status: DISCONTINUED | OUTPATIENT
Start: 2022-06-20 | End: 2022-06-28

## 2022-06-20 RX ORDER — PROPOFOL 10 MG/ML
INJECTION, EMULSION INTRAVENOUS PRN
Status: DISCONTINUED | OUTPATIENT
Start: 2022-06-20 | End: 2022-06-20 | Stop reason: SDUPTHER

## 2022-06-20 RX ORDER — PROPOFOL 10 MG/ML
INJECTION, EMULSION INTRAVENOUS CONTINUOUS PRN
Status: DISCONTINUED | OUTPATIENT
Start: 2022-06-20 | End: 2022-06-20 | Stop reason: SDUPTHER

## 2022-06-20 RX ADMIN — SODIUM CHLORIDE 3000 MG: 900 INJECTION INTRAVENOUS at 04:43

## 2022-06-20 RX ADMIN — Medication 5 MG: at 21:06

## 2022-06-20 RX ADMIN — ENOXAPARIN SODIUM 40 MG: 100 INJECTION SUBCUTANEOUS at 07:53

## 2022-06-20 RX ADMIN — SODIUM CHLORIDE, PRESERVATIVE FREE 10 ML: 5 INJECTION INTRAVENOUS at 21:04

## 2022-06-20 RX ADMIN — SODIUM CHLORIDE 200 ML/HR: 9 INJECTION, SOLUTION INTRAVENOUS at 11:57

## 2022-06-20 RX ADMIN — PROPOFOL 20 MG: 10 INJECTION, EMULSION INTRAVENOUS at 16:24

## 2022-06-20 RX ADMIN — PANTOPRAZOLE SODIUM 40 MG: 40 INJECTION, POWDER, LYOPHILIZED, FOR SOLUTION INTRAVENOUS at 21:06

## 2022-06-20 RX ADMIN — SODIUM CHLORIDE 3000 MG: 900 INJECTION INTRAVENOUS at 21:12

## 2022-06-20 RX ADMIN — SODIUM CHLORIDE 3000 MG: 900 INJECTION INTRAVENOUS at 00:23

## 2022-06-20 RX ADMIN — PROPOFOL 150 MCG/KG/MIN: 10 INJECTION, EMULSION INTRAVENOUS at 16:25

## 2022-06-20 RX ADMIN — SODIUM CHLORIDE 3000 MG: 900 INJECTION INTRAVENOUS at 11:58

## 2022-06-20 ASSESSMENT — PAIN SCALES - GENERAL
PAINLEVEL_OUTOF10: 0

## 2022-06-20 ASSESSMENT — PAIN SCALES - WONG BAKER: WONGBAKER_NUMERICALRESPONSE: 0

## 2022-06-20 ASSESSMENT — PAIN - FUNCTIONAL ASSESSMENT: PAIN_FUNCTIONAL_ASSESSMENT: NONE - DENIES PAIN

## 2022-06-20 NOTE — ANESTHESIA POSTPROCEDURE EVALUATION
Department of Anesthesiology  Postprocedure Note    Patient: Nessa Escobar  MRN: [id number]  YOB: 1935  Date of evaluation: 6/20/2022      Procedure Summary     Date: 06/20/22 Room / Location: [facility]    Anesthesia Start: 3086 Anesthesia Stop: 3169    Procedure: EGD ESOPHAGOGASTRODUODENOSCOPY (N/A ) Diagnosis:       Dysphagia, unspecified type      (Dysphagia, unspecified type [R13.10])    Surgeons: Zan Zepeda MD Responsible Provider: Debra Garcia DO    Anesthesia Type: MAC ASA Status: 3          Anesthesia Type: No value filed. Juan Manuel Phase I: Juan Manuel Score: 9    Juan Manuel Phase II: Juan Manuel Score: 6    Last vitals:   Vitals Value Taken Time   BP 96/65 06/20/22 1649   Temp  06/20/22 1649   Pulse 87 06/20/22 1649   Resp 0 06/20/22 1649   SpO2 100 % 06/20/22 1649   Vitals shown include unvalidated device data.       Anesthesia Post Evaluation    Patient location during evaluation: PACU  Patient participation: complete - patient participated  Level of consciousness: awake and alert  Airway patency: patent  Nausea & Vomiting: no nausea and no vomiting  Cardiovascular status: blood pressure returned to baseline  Respiratory status: acceptable  Hydration status: euvolemic

## 2022-06-20 NOTE — CONSULTS
[facility]  Palliative Medicine Consultation Note      Date Of Admission:6/16/2022  Date of consult: 06/20/22  Seen by [facility] in the past:  No    Recommendations:        Met with the pt and his three children and pt at the bedside. Pt responds verbally to questions but responses are minimal.     Pt's children report that the pt was living at home independently until his admission to [address]. His daughter Marco A parish reported that she would check on him daily, and he had meals on wheels through [address]. She does endorse forgetfulness, did not usually know the date or current events and would get lost while driving so gave up his license. She reports she found him down on the ground which is why he was brought to [address] where he was found to have [address]. They report he was at [facility] only for a day prior to coming here. They report at [facility] they were asked if they wanted to keep the pt there and enroll him with hospice/make him a DNR which they did not agree with so he was brought to the hospital. They understand he may need SNF at discharge. Eunice plans to eventually take the pt to her house with Helenbebeiram Santiago. They report [place] is pt's HCPOA. She will bring a copy to the hospital.     1. Goals of Care/Advanced Care planning/Code status: Full code, plan to continue with current management. Family hopeful pt will eventually regain enough strength to return home with his daughter. 2. Pain: pt denies  3. SOB, aspiration PNA: pt denies, on 2L NC. Appears with increased WOB following working with therapy  4. Aspiration: SLP rec minced and moist solids with thickened liquids  5. Disposition: Suspect will need SNF when medically ready for discharge and family in agreement with this. 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 D/w TERESO Ch.      Reason for Consult:         [x]  Goals of Care  [x]  Code Status Discussion/Advanced Care Planning   []  Psychosocial/Family Support  [] Symptom Management  []  Other (Specify)    Requesting Physician: Dr. Wen Ek:  Aspiration PNA    History Obtained From:  family member - children, electronic medical record    History of Present Illness:         Maury Alonso is a 80 y.o. male with PMH as below who presented with shortness of breath. Of note, he was admitted to [facility] one week ago for falls and increased weakness. He was found to have COVID19, no other symptoms than increased weakness. During that admission a swallow evaluation was performed - he was advised to be on a dysphagia diet and thickened liquids. He was discharged to [facility]. He reportedly aspirated while at SNF and brought back to the hospital for SOB and hypoxia. Subjective:         Past Medical History:        Diagnosis Date    COVID-19 06/10/2022    [facility]       Past Surgical History:    No past surgical history on file. Current Medications:    Medications Prior to Admission: acetaminophen (TYLENOL) 325 MG tablet, Take 650 mg by mouth every 6 hours as needed for Pain  calcium carbonate (TUMS) 500 MG chewable tablet, Take 2 tablets by mouth every 6 hours as needed for Heartburn  metoprolol succinate (TOPROL XL) 50 MG extended release tablet, Take 50 mg by mouth daily Hold for HR < 60 or SBP < 100  pantoprazole (PROTONIX) 40 MG tablet, Take 40 mg by mouth in the morning and at bedtime    Allergies:  Patient has no known allergies. Social History:    · TOBACCO: reports that he has never smoked. He has never used smokeless tobacco.  · ETOH:   reports previous alcohol use. · Patient currently lives alone    Review of Systems -   Review of Systems limited, pt is a poor historian    Objective:          Physical Exam  Constitutional:       Appearance: He is ill-appearing. Cardiovascular:      Rate and Rhythm: Normal rate and regular rhythm. Heart sounds: Normal heart sounds. Pulmonary:      Breath sounds: Normal breath sounds. Abdominal:      General: Bowel sounds are normal.      Palpations: Abdomen is soft. Musculoskeletal:      Right lower leg: No edema. Left lower leg: No edema. Skin:     General: Skin is warm and dry. Neurological:      Comments: Lethargic, answers questions with one or two words          Palliative Performance Scale:  [] 60% Ambulation reduced; Significant disease; Can't do hobbies/housework; intake normal or reduced; occasional assist; LOC full/confusion  [] 50% Mainly sit/lie; Extensive disease; Can't do any work; Considerable assist; intake normal  Or reduced; LOC full/confusion  [x] 40% Mainly in bed; Extensive disease; Mainly assist; intake normal or reduced; occasional assist; LOC full/confusion  [] 30% Bed Bound; Extensive disease; Total care; intake reduced; LOC full/confusion  [] 20% Bed Bound; Extensive disease; Total care; intake minimal; Drowsy/coma  [] 10% Bed Bound; Extensive disease; Total care; Mouth care only; Drowsy/coma  [] 0% Death    PPS: 40    Vitals:    /89   Pulse 81   Temp 98.8 °F (37.1 °C) (Oral)   Resp 20   Ht 5' 10\" (1.778 m)   Wt 156 lb (70.8 kg)   SpO2 99%   BMI 22.38 kg/m²     Labs:    BMP:   Recent Labs     06/18/22  0923 06/19/22  1444    144   K 3.3* 3.7   * 112*   CO2 18* 20*   BUN 34* 25*   CREATININE 1.6* 1.7*   GLUCOSE 100* 122*     CBC:   Recent Labs     06/18/22  0923 06/19/22  1444   WBC 9.1 7.7   HGB 12.9* 11.9*   HCT 38.4* 36.4*    237       LFT's: No results for input(s): AST, ALT, ALB, BILITOT, ALKPHOS in the last 72 hours. Troponin: No results for input(s): TROPONINI in the last 72 hours. BNP: No results for input(s): BNP in the last 72 hours. ABGs: No results for input(s): PHART, DWT5WIU, PO2ART in the last 72 hours. INR: No results for input(s): INR in the last 72 hours.     U/A:No results for input(s): NITRITE, COLORU, PHUR, LABCAST, WBCUA, RBCUA, MUCUS, TRICHOMONAS, YEAST, BACTERIA, CLARITYU, SPECGRAV, LEUKOCYTESUR, UROBILINOGEN, BILIRUBINUR, BLOODU, GLUCOSEU, AMORPHOUS in the last 72 hours. Invalid input(s): KETONESU    FL MODIFIED BARIUM SWALLOW W VIDEO   Final Result      1. Multilevel episodes of tracheal aspiration as detailed above. CT CHEST WO CONTRAST   Final Result   1. Right lower lobe bronchovascular crowding and airspace disease, small pneumonia with adjacent pleural thickening   2. Large hiatal hernia   3. Cardiomegaly with coronary artery calcification   4. Vertebral body fracture of L1 is age indeterminate. XR CHEST PORTABLE   Final Result   1. Cardiomegaly and aortic tortuosity   2. Minimal hazy density right lung base, differential radiolucency of the lungs is secondary to patient rotation   3. Hypertrophic osteophytes of the shoulders, left greater than right            Conclusion/Time spent:         Recommendations see above    Time spent with patient and/or family: 20  Time reviewing records: 10 min   Time communicating with staff: 5 min     A total of 35 minutes spent with the patient and family on unit greater than 50% in counseling regarding palliative care and in goals of care for the patient. Thank you to Dr. Didier Maddox for this consultation. We will continue to follow Mr. Maloney Parents care as needed.     [address] [facility]  Inpatient Palliative Care  [phone]

## 2022-06-20 NOTE — CONSULTS
Consult Note      Tori Hernandez  1935    Consultant: Corbin  Reason for Consult:  EGD for dysphagia  Requesting Physician:  Jose    CHIEF COMPLAINT:  dyspnea    History Obtained From:  patient, electronic medical record    HISTORY OF PRESENT ILLNESS:                The patient is a 80 y.o. male with significant past medical history of HTN who presents with dyspnea. Recently at [facility] after falling and found to have [address]. No problems prior to this but working diagnosis also possible aspiration. Had some aspiration on MBS here. He was sent home from [facility] with a thickened liquid diet but this was not followed per notes. We are asked to do EGD for dysphagia. Past Medical History:        Diagnosis Date    COVID-19 06/10/2022    [facility]   HTN    Past Surgical History:    No past surgical history on file.   Medications at Home:  Medications Prior to Admission: acetaminophen (TYLENOL) 325 MG tablet, Take 650 mg by mouth every 6 hours as needed for Pain  calcium carbonate (TUMS) 500 MG chewable tablet, Take 2 tablets by mouth every 6 hours as needed for Heartburn  metoprolol succinate (TOPROL XL) 50 MG extended release tablet, Take 50 mg by mouth daily Hold for HR < 60 or SBP < 100  pantoprazole (PROTONIX) 40 MG tablet, Take 40 mg by mouth in the morning and at bedtime  Current Medications:    Current Facility-Administered Medications: melatonin disintegrating tablet 5 mg, 5 mg, Oral, Nightly  ampicillin-sulbactam (UNASYN) 3000 mg in 100 mL NS IVPB minibag, 3,000 mg, IntraVENous, Q6H  sodium chloride flush 0.9 % injection 10 mL, 10 mL, IntraVENous, 2 times per day  sodium chloride flush 0.9 % injection 10 mL, 10 mL, IntraVENous, PRN  0.9 % sodium chloride infusion, , IntraVENous, PRN  enoxaparin (LOVENOX) injection 40 mg, 40 mg, SubCUTAneous, Daily  ondansetron (ZOFRAN) injection 4 mg, 4 mg, IntraVENous, Q4H PRN  polyethylene glycol (GLYCOLAX) packet 17 g, 17 g, Oral, Daily PRN  acetaminophen (TYLENOL) tablet 650 mg, 650 mg, Oral, Q4H PRN **OR** acetaminophen (TYLENOL) suppository 650 mg, 650 mg, Rectal, Q4H PRN  Allergies:  Patient has no known allergies. Social History:    TOBACCO:   reports that he has never smoked. He has never used smokeless tobacco.  ETOH:   reports previous alcohol use. DRUGS:   reports no history of drug use. Family History:   No family history on file. REVIEW OF SYSTEMS:    A comprehensive 12 point review of systems is negative except as documented above. PHYSICAL EXAM:      Vitals:    /77   Pulse 79   Temp 97.9 °F (36.6 °C) (Temporal)   Resp 20   Ht 5' 10\" (1.778 m)   Wt 156 lb (70.8 kg)   SpO2 95%   BMI 22.38 kg/m²     General: No acute distress  HEENT: PERRL, EOMI, oral mucosa moist and intact, no sclericterus  Neck: no thyromegaly  Lymphatic: no cervical or supraclavicular lymphadenopathy  Heart: no m/r/g; +s1/s2 rrr  Lungs: rales bilaterally  Abdomen: soft nt, nd +BS  Extremities: 2+pulses, no edema  Skin: no rashes or lesions  Neuro: a&o x 3; no gross deficit  DATA:    Recent Labs     06/18/22  0923 06/19/22  1444 06/20/22  0926   WBC 9.1 7.7 7.8   HGB 12.9* 11.9* 12.2*   HCT 38.4* 36.4* 36.8*   MCV 86.8 86.1 87.5    237 see below     Recent Labs     06/18/22  0923 06/19/22  1444 06/20/22  0926    144 142   K 3.3* 3.7 3.4*   * 112* 110   CO2 18* 20* 21   BUN 34* 25* 21*   CREATININE 1.6* 1.7* 1.6*     No results for input(s): AST, ALT, ALB, BILIDIR, BILITOT, ALKPHOS in the last 72 hours. No results for input(s): LIPASE, AMYLASE in the last 72 hours. No results for input(s): PROT, INR in the last 72 hours. No results for input(s): PTT in the last 72 hours. No results for input(s): OCCULTBLD in the last 72 hours.     [facility] [place]  CT    IMPRESSION/RECOMMENDATIONS:      Dysphagia    Large hiatal hernia on CT; question if this is contributing along with some oropharyngeal dysphagia due to secretions etc from recent COVID since this appears to be acute.   Will do EGD to rule out other causes and assess if a PEG could be placed or not if it comes to that. Thank you for allowing me to participate in [address] [facility]. Stephane Pack.  MD Corbin

## 2022-06-20 NOTE — PROGRESS NOTES
Hospitalist Progress Note      PCP: Osman Goldsmith MD    Date of Admission: 6/16/2022    Chief Complaint: Aspiration Pneumonia    Hospital Course: H&P    Subjective: Patient seen and evaluated at the bedside, no acute events overnight, lying in bed, patient appears more alert and awake today, MBS did show multilevel aspiration, he feels hungry and ready to eat and asking for nursing assistance for eating, nursing staff informed    Medications:  Reviewed    Infusion Medications    sodium chloride 200 mL/hr at 06/20/22 1616     Scheduled Medications    pantoprazole  40 mg IntraVENous BID    melatonin  5 mg Oral Nightly    ampicillin-sulbactam  3,000 mg IntraVENous Q6H    sodium chloride flush  10 mL IntraVENous 2 times per day    enoxaparin  40 mg SubCUTAneous Daily     PRN Meds: sodium chloride flush, sodium chloride, ondansetron, polyethylene glycol, acetaminophen **OR** acetaminophen      Intake/Output Summary (Last 24 hours) at 6/20/2022 1854  Last data filed at 6/20/2022 1635  Gross per 24 hour   Intake 100 ml   Output 200 ml   Net -100 ml       Physical Exam Performed:    /89   Pulse 79   Temp 97.5 °F (36.4 °C) (Tympanic)   Resp 18   Ht 5' 10\" (1.778 m)   Wt 156 lb (70.8 kg)   SpO2 98%   BMI 22.38 kg/m²     General appearance: NAD, on room air  HEENT: Pupils equal, round, and reactive to light. Conjunctivae/corneas clear. Neck: Supple, with full range of motion. No jugular venous distention. Trachea midline. Respiratory:  Normal respiratory effort. Clear to auscultation, bilaterally without Rales/Wheezes/Rhonchi. Cardiovascular: Regular rate and rhythm with normal S1/S2 without murmurs, rubs or gallops. Abdomen: Soft, non-tender, non-distended with normal bowel sounds. Musculoskeletal: No clubbing, cyanosis or edema bilaterally. Full range of motion without deformity. Skin: Skin color, texture, turgor normal.  No rashes or lesions.   Neurologic:  Neurovascularly intact without any focal sensory/motor deficits. Cranial nerves: II-XII intact, grossly non-focal.  Psychiatric: Alert and awake  Capillary Refill: Brisk,3 seconds, normal   Peripheral Pulses: +2 palpable, equal bilaterally       Labs:   Recent Labs     06/18/22  0923 06/19/22  1444 06/20/22  0926   WBC 9.1 7.7 7.8   HGB 12.9* 11.9* 12.2*   HCT 38.4* 36.4* 36.8*    237 see below     Recent Labs     06/18/22  0923 06/19/22  1444 06/20/22  0926    144 142   K 3.3* 3.7 3.4*   * 112* 110   CO2 18* 20* 21   BUN 34* 25* 21*   CREATININE 1.6* 1.7* 1.6*   CALCIUM 7.9* 8.0* 7.9*     No results for input(s): AST, ALT, BILIDIR, BILITOT, ALKPHOS in the last 72 hours. No results for input(s): INR in the last 72 hours. No results for input(s): [place] in the last 72 hours. Urinalysis:      Lab Results   Component Value Date    NITRU Negative 06/20/2022    WBCUA 0-2 06/20/2022    RBCUA  06/20/2022    BLOODU LARGE 06/20/2022    SPECGRAV 1.025 06/20/2022    GLUCOSEU Negative 06/20/2022       Radiology:  FL MODIFIED BARIUM SWALLOW W VIDEO   Final Result      1. Multilevel episodes of tracheal aspiration as detailed above. CT CHEST WO CONTRAST   Final Result   1. Right lower lobe bronchovascular crowding and airspace disease, small pneumonia with adjacent pleural thickening   2. Large hiatal hernia   3. Cardiomegaly with coronary artery calcification   4. Vertebral body fracture of L1 is age indeterminate. XR CHEST PORTABLE   Final Result   1. Cardiomegaly and aortic tortuosity   2. Minimal hazy density right lung base, differential radiolucency of the lungs is secondary to patient rotation   3.  Hypertrophic osteophytes of the shoulders, left greater than right              Assessment/Plan:    Active Hospital Problems    Diagnosis     Acute aspiration pneumonia ([address] [J69.0]      Priority: Medium    Acute respiratory failure with hypoxia ([address] [J96.01]      Priority: Medium    COVID-19 viremia - Tested positive 06/09/2022 [U07.1]      Priority: Medium    Generalized weakness [R53.1]      Priority: Medium     Pneumonia, due to aspiration - Pt has been started on Unasyn  - Pt tested positive for the COVID-19 virus in 06/09/2022, and dos not appear to have COVID pneumonia  - Respiratory culture ordered  - Legionella pneumophilia and Strep pneumoniae urine antigens ordered     COVID-19 viremia - Tested positive 06/09/2022 - Provide symptomatic treatment     Acute respiratory failure with hypoxia (HCC) - due to aspiration pneumonia, not COVID. As evidenced by an oxygen saturation of less than 90% on room air. We will provide oxygen as needed to maintain an oxygen saturation of 92% or higher.     Aspiration   -Speech evaluated. -Recommend dysphagia minced&Moist solids, moderately Thick  MBS shows multilevel aspiration     Generalized weakness   - PT/OT to evaluate and treat patient, and if necessary to provide recommendations for post hospital therapy     Acute metabolic encephalopathy -we will provide supportive care    MARY vs CKD -improving  -Cr 2.45 on 6/11  -Continue IVF    DVT Prophylaxis: Lovenox  Diet: ADULT DIET;  Dysphagia - Minced and Moist; Moderately Thick (Honey)  Code Status: Full Code    PT/OT Eval Status: ordered    Dispo/plan- continue IV Unasyn, consulted GI for dysphagia-plan for surgery today, nephrology continues to follow, consult palliative care, PEG tube versus oral intake, await EGD    Isis Christianson MD

## 2022-06-20 NOTE — PROGRESS NOTES
Benjamin catheter exchanged this shift per protocol. Patient tolerated well. Urine specimens obtained and sent to lab per MD orders.

## 2022-06-20 NOTE — CONSULTS
Interval History and plan:      Blood pressures well controlled. Urine output is not measured. Plan:    Discontinue IV fluid encourage oral intake. Continue IV antibiotics. He is in stage IIIb chronic kidney disease since 2019. Creatinine is currently at baseline. I will check urinalysis, urine for protein creatinine ratio  I will check serum uric acid. I will check free light chain, SPEP and UPEP. Assessment :     Stage IIIb chronic kidney disease: He presented with a creatinine of 1.7. He has baseline creatinine fluctuates between 2.5-1.5. He has kidney disease since 2019. Anemia: Hemoglobin is 12 and there is no need for Procrit. Aspiration pneumonia/COVID-19: CT chest shows right lower lobe crowding and airspace disease. [facility] would like to thank Aby Hope MD   for opportunity to serve this patient      Please call with questions at-   24 Hrs Answering service [phone] or  7 am- 5 pm via Perfect serve or cell phone  Cristin Mcpherson MD          CC/reason for consult :     Renal insufficiency     HPI :     Toyin Dacosta is a 80 y.o. male presented to   the hospital on 6/16/2022 with aspiration pneumonia    He has past medical history significant for chronic atrial fibrillation on Eliquis, aortic valve stenosis, chronic kidney disease stage III, history of COVID-19 pneumonia, rhabdomyolysis, severe protein calorie malnourishment. He is now admitted with aspiration pneumonia and is on antibiotics. He is also on isolation for COVID-19. Creatinine on arrival was 2.4. We are called for further management of renal insufficiency.     ROS:     Seen with-family in the room    positives in bold   Labs were obtained                All other remaining systems are negative or unable to obtain        PMH/PSH/SH/Family History:     Past Medical History:   Diagnosis Date    COVID-19 06/10/2022    [facility]       No past surgical history on file.     reports that he has never smoked. He has never used smokeless tobacco. He reports previous alcohol use. He reports that he does not use drugs. family history is not on file. Medication:     Current Facility-Administered Medications: melatonin disintegrating tablet 5 mg, 5 mg, Oral, Nightly  ampicillin-sulbactam (UNASYN) 3000 mg in 100 mL NS IVPB minibag, 3,000 mg, IntraVENous, Q6H  sodium chloride flush 0.9 % injection 10 mL, 10 mL, IntraVENous, 2 times per day  sodium chloride flush 0.9 % injection 10 mL, 10 mL, IntraVENous, PRN  0.9 % sodium chloride infusion, , IntraVENous, PRN  enoxaparin (LOVENOX) injection 40 mg, 40 mg, SubCUTAneous, Daily  ondansetron (ZOFRAN) injection 4 mg, 4 mg, IntraVENous, Q4H PRN  polyethylene glycol (GLYCOLAX) packet 17 g, 17 g, Oral, Daily PRN  acetaminophen (TYLENOL) tablet 650 mg, 650 mg, Oral, Q4H PRN **OR** acetaminophen (TYLENOL) suppository 650 mg, 650 mg, Rectal, Q4H PRN  0.9 % sodium chloride infusion, , IntraVENous, Continuous       Vitals :     Vitals:    06/20/22 0730   BP: 127/89   Pulse: 81   Resp: 20   Temp:    SpO2: 99%       I & O :       Intake/Output Summary (Last 24 hours) at 6/20/2022 1054  Last data filed at 6/19/2022 1705  Gross per 24 hour   Intake 240 ml   Output 350 ml   Net -110 ml        Physical Examination :     Minimal exam was performed to minimize exposure to COVID-19. Patient was seen and evaluated from the door.   Preserving PPE       LABS:     Recent Labs     06/18/22  0923 06/19/22  1444   WBC 9.1 7.7   HGB 12.9* 11.9*   HCT 38.4* 36.4*    237     Recent Labs     06/18/22  0923 06/19/22  1444    144   K 3.3* 3.7   * 112*   CO2 18* 20*   BUN 34* 25*   CREATININE 1.6* 1.7*   GLUCOSE 100* 122*   MG 1.90  --       Nephrology  [address] [place] [address]  Office: [phone]  Fax: [phone]

## 2022-06-20 NOTE — PROGRESS NOTES
Occupational Therapy  Facility/Department: [address]  Occupational Therapy Treatment    Name: Demond Mcghee  : 1935  MRN: [id number]  Date of Service: 2022    Discharge Recommendations: Demond Mcghee scored a 10/24 on the AM-PAC ADL Inpatient form. Current research shows that an AM-PAC score of 17 or less is typically not associated with a discharge to the patient's home setting. Based on the patient's AM-PAC score and their current ADL deficits, it is recommended that the patient have 3-5 sessions per week of Occupational Therapy at d/c to increase the patient's independence. Please see assessment section for further patient specific details. If patient discharges prior to next session this note will serve as a discharge summary. Please see below for the latest assessment towards goals. OT Equipment Recommendations  Equipment Needed: No  Other: defer       Patient Diagnosis(es): The encounter diagnosis was Aspiration into airway, initial encounter. Past Medical History:  has a past medical history of COVID-19. Past Surgical History:  has no past surgical history on file. Treatment Diagnosis: impaired mobility, transfers, ADL      Assessment   Performance deficits / Impairments: Decreased functional mobility ; Decreased ADL status; Decreased endurance;Decreased strength;Decreased cognition;Decreased posture  Assessment: Pt admit from rehab facility [address] [place] [address]. Pt remains confused with limited communication. Family present this session and assisting. Pt requires x2 assist with bed mobility this date, sitting EOB with Paola to SBA for 10 min completing simple ADLs. Limited by pain and cognition. Unable to tolerate standing at this time. Would benefit from cont OT while in acute care and cont inpt at AL.   Treatment Diagnosis: impaired mobility, transfers, ADL  REQUIRES OT FOLLOW-UP: Yes  Activity Tolerance  Activity Tolerance: Patient limited by fatigue;Patient limited by pain;Treatment limited secondary to decreased cognition        Plan   Plan  Times per Week: 2-5  Times per Day: Daily     Restrictions  Position Activity Restriction  Other position/activity restrictions: up with assist    Subjective   General  Chart Reviewed: Yes  Patient assessed for rehabilitation services?: Yes  Additional Pertinent Hx: 80y.o. year-old male who was admitted to [facility] 1 week ago for falls and increased weakness. He was found to have COVID-19 but had no symptoms other than increased weakness. While there he failed a swallow evaluation and was advised to follow a dysphagia diet with thickened liquids. He was discharged yesterday to [facility] for physical therapy rehab. Apparently the recommended diet was not followed and he aspirated. He developed shortness of breath and an x-ray was done which was consistent with aspiration. On evaluation in the emergency room he was found to have a new oxygen requirement associated with aspiration pneumonia. Referring Practitioner: Brad Fitch MD  Diagnosis: aspiration  Subjective  Subjective:  In bed family present, agreeable to session     Social/Functional History  Social/Functional History  Lives With: Alone,Daughter  Type of Home: House  Home Layout: One level,Able to Live on Main level with bedroom/bathroom  Home Access: Stairs to enter with rails  Entrance Stairs - Number of Steps: 2  Entrance Stairs - Rails: Both  Bathroom Shower/Tub: Walk-in shower  Bathroom Equipment: Grab bars in shower,Shower chair  Bathroom Accessibility: Wheelchair accessible  Home Equipment: Walker, rolling,Cane  Has the patient had two or more falls in the past year or any fall with injury in the past year?: Yes  Receives Help From: Family  ADL Assistance: Independent  Toileting: Independent  Homemaking Assistance: Independent  Ambulation Assistance: Needs assistance  Transfer Assistance: Independent  Active : No  Occupation: Retired  Additional Comments: History was taken by daughter present in the room       Objective   Heart Rate: 82  Heart Rate Source: Monitor  BP: 132/77  BP Location: Left upper arm  BP Method: Automatic  Patient Position: Semi fowlers  MAP (Calculated): 95.33  Resp: 16  SpO2: 99 %  O2 Device: Nasal cannula             Safety Devices  Type of Devices: All fall risk precautions in place; Bed alarm in place;Call light within reach; Patient at risk for falls; Left in bed;Nurse notified  Bed Mobility Training  Bed Mobility Training: Yes  Overall Level of Assistance: Maximum assistance  Supine to Sit: Moderate assistance;Assist X2  Sit to Supine: Maximum assistance;Assist X2  Transfer Training  Transfer Training:  (unable)        ADL  Grooming: Minimal assistance (wipe face and hands seated EOB)  LE Dressing: Dependent/Total  Toileting:  (cath)                                    Education Given To: Patient; Family  Education Provided: Role of Therapy;Plan of Care;ADL Adaptive Strategies;Transfer Training  Education Method: Demonstration;Verbal  Barriers to Learning: Hearing;Cognition  Education Outcome: Continued education needed                          AM-PAC Score        AM-PAC Inpatient Daily Activity Raw Score: 10 (06/20/22 1520)  AM-PAC Inpatient ADL T-Scale Score : 27.31 (06/20/22 1520)  ADL Inpatient CMS 0-100% Score: 74.7 (06/20/22 1520)  ADL Inpatient CMS G-Code Modifier : CL (06/20/22 1520)    Goals  Short Term Goals  Time Frame for Short term goals: dc  Short Term Goal 1: supine to sit Paola  Short Term Goal 2: sit balance CGA EOB for 5 min  Short Term Goal 3: grooming ADL EOB with CGA  Short Term Goal 4: Tolerate fx transfer assessment       Therapy Time   Individual Concurrent Group Co-treatment   Time In 1400         Time Out 1423         Minutes 23              Timed Code Treatment Minutes:   23    Total Treatment Minutes:  [address], OT

## 2022-06-20 NOTE — PLAN OF CARE
Problem: ABCDS Injury Assessment  Goal: Absence of physical injury  Outcome: Progressing     Problem: Respiratory - Adult  Goal: Achieves optimal ventilation and oxygenation  Outcome: Progressing     Problem: Discharge Planning  Goal: Discharge to home or other facility with appropriate resources  Outcome: Progressing     Problem: Pain  Goal: Verbalizes/displays adequate comfort level or baseline comfort level  Outcome: Progressing  Flowsheets (Taken 6/19/2022 1703 by Estrella Rosario RN)  Verbalizes/displays adequate comfort level or baseline comfort level: Encourage patient to monitor pain and request assistance     Problem: Safety - Adult  Goal: Free from fall injury  Outcome: Progressing     Problem: Skin/Tissue Integrity  Goal: Absence of new skin breakdown  Description: 1. Monitor for areas of redness and/or skin breakdown  2. Assess vascular access sites hourly  3. Every 4-6 hours minimum:  Change oxygen saturation probe site  4. Every 4-6 hours:  If on nasal continuous positive airway pressure, respiratory therapy assess nares and determine need for appliance change or resting period.   Outcome: Progressing

## 2022-06-20 NOTE — ANESTHESIA PRE PROCEDURE
Department of Anesthesiology  Preprocedure Note       Name:  Stephanie Ibarra   Age:  80 y.o.  :  1935                                          MRN:  [id number]         Date:  2022      Surgeon: Felicitas Stinson):  Anjelica Christensen MD    Procedure: Procedure(s):  EGD ESOPHAGOGASTRODUODENOSCOPY    Medications prior to admission:   Prior to Admission medications    Medication Sig Start Date End Date Taking?  Authorizing Provider   acetaminophen (TYLENOL) 325 MG tablet Take 650 mg by mouth every 6 hours as needed for Pain   Yes Historical Provider, MD   calcium carbonate (TUMS) 500 MG chewable tablet Take 2 tablets by mouth every 6 hours as needed for Heartburn   Yes Historical Provider, MD   metoprolol succinate (TOPROL XL) 50 MG extended release tablet Take 50 mg by mouth daily Hold for HR < 60 or SBP < 100   Yes Historical Provider, MD   pantoprazole (PROTONIX) 40 MG tablet Take 40 mg by mouth in the morning and at bedtime   Yes Historical Provider, MD   amoxicillin-clavulanate (AUGMENTIN) 875-125 MG per tablet Take 1 tablet by mouth 2 times daily for 7 days Crushed in puree 22 Yes Shaan Michaels MD       Current medications:    Current Facility-Administered Medications   Medication Dose Route Frequency Provider Last Rate Last Admin    melatonin disintegrating tablet 5 mg  5 mg Oral Nightly Amada Yadav MD   5 mg at 22    ampicillin-sulbactam (UNASYN) 3000 mg in 100 mL NS IVPB minibag  3,000 mg IntraVENous Q6H Amada Yadav  mL/hr at 22 1158 3,000 mg at 22 1158    sodium chloride flush 0.9 % injection 10 mL  10 mL IntraVENous 2 times per day Amada Yadav MD   10 mL at 22    sodium chloride flush 0.9 % injection 10 mL  10 mL IntraVENous PRN Amada Yadav MD        0.9 % sodium chloride infusion   IntraVENous PRN Amada Yadav  mL/hr at 22 1157 200 mL/hr at 22 1157    enoxaparin (LOVENOX) injection 40 mg  40 mg SubCUTAneous Daily Jason Jacobs MD   40 mg at 06/20/22 0753    ondansetron (ZOFRAN) injection 4 mg  4 mg IntraVENous Q4H PRN Jason Jacobs MD        polyethylene glycol [facility]) packet 17 g  17 g Oral Daily PRN Jason Jacobs MD        acetaminophen (TYLENOL) tablet 650 mg  650 mg Oral Q4H PRN Jason Jacobs MD   650 mg at 06/18/22 1747    Or    acetaminophen (TYLENOL) suppository 650 mg  650 mg Rectal Q4H PRN Jason Jacobs MD           Allergies:  No Known Allergies    Problem List:    Patient Active Problem List   Diagnosis Code    Acute aspiration pneumonia ([facility] [address] J69.0    Acute respiratory failure with hypoxia ([facility] [address] [address] J96.01    COVID-19 viremia - Tested positive 06/09/2022 U07.1    Generalized weakness R53.1       Past Medical History:        Diagnosis Date    COVID-19 06/10/2022    [facility]       Past Surgical History:  No past surgical history on file. Social History:    Social History     Tobacco Use    Smoking status: Never Smoker    Smokeless tobacco: Never Used   Substance Use Topics    Alcohol use: Not Currently                                Counseling given: Not Answered      Vital Signs (Current):   Vitals:    06/19/22 2012 06/20/22 0010 06/20/22 0730 06/20/22 1140   BP: 139/84 118/80 127/89 132/77   Pulse: 93 96 81 82   Resp: 18 18 20 16   Temp: 97.6 °F (36.4 °C) 98.8 °F (37.1 °C) 97.8 °F (36.6 °C) 98.2 °F (36.8 °C)   TempSrc: Axillary Oral Axillary Axillary   SpO2: 98% 100% 99% 99%   Weight:       Height:                                                  BP Readings from Last 3 Encounters:   06/20/22 132/77       NPO Status:                                                                                 BMI:   Wt Readings from Last 3 Encounters:   06/16/22 156 lb (70.8 kg)     Body mass index is 22.38 kg/m².     CBC:   Lab Results   Component Value Date    WBC 7.8 06/20/2022    RBC 4.21 06/20/2022    HGB 12.2 06/20/2022    HCT 36.8 06/20/2022    MCV 87.5 06/20/2022    RDW 14.9 06/20/2022    PLT see below 06/20/2022       CMP:   Lab Results   Component Value Date     06/20/2022    K 3.4 06/20/2022     06/20/2022    CO2 21 06/20/2022    BUN 21 06/20/2022    CREATININE 1.6 06/20/2022    GFRAA 50 06/20/2022    LABGLOM 41 06/20/2022    GLUCOSE 83 06/20/2022    CALCIUM 7.9 06/20/2022       POC Tests: No results for input(s): POCGLU, POCNA, POCK, POCCL, POCBUN, POCHEMO, POCHCT in the last 72 hours. Coags: No results found for: PROTIME, INR, APTT    HCG (If Applicable): No results found for: PREGTESTUR, PREGSERUM, HCG, HCGQUANT     ABGs: No results found for: PHART, PO2ART, TNC6OSJ, GYS9ZPE, BEART, W4QBCTYZ     Type & Screen (If Applicable):  No results found for: LABABO, LABRH    Drug/Infectious Status (If Applicable):  No results found for: HIV, HEPCAB    COVID-19 Screening (If Applicable):   Lab Results   Component Value Date    COVID19 Not Detected 06/18/2022    COVID19 Positive 06/10/2022           Anesthesia Evaluation  Patient summary reviewed and Nursing notes reviewed no history of anesthetic complications:   Airway: Mallampati: II  TM distance: >3 FB   Neck ROM: full  Mouth opening: > = 3 FB   Dental:    (+) edentulous      Pulmonary:   (+) pneumonia:  decreased breath sounds                           ROS comment: +COVID 19 positive  +aspiration PNA   Cardiovascular:  Exercise tolerance: poor (<4 METS),           Rhythm: regular  Rate: normal                    Neuro/Psych:               GI/Hepatic/Renal:   (+) GERD:,           Endo/Other:    (+) : arthritis: OA., .                 Abdominal:             Vascular: Other Findings:           Anesthesia Plan      MAC     ASA 3       Induction: intravenous. Anesthetic plan and risks discussed with patient. Plan discussed with CRNA.                     Viviana Garza,    6/20/2022

## 2022-06-20 NOTE — PROGRESS NOTES
Physical Therapy  Facility/Department: [address] [place] [address]  Physical Therapy Initial Assessment    Name: Aurora Thakur  : 1935  MRN: [id number]  Date of Service: 2022    Discharge Recommendations: Aurora Thakur scored a / on the AM-PAC short mobility form. Current research shows that an AM-PAC score of 17 or less is typically not associated with a discharge to the patient's home setting. Based on the patient's AM-PAC score and their current functional mobility deficits, it is recommended that the patient have 3-5 sessions per week of Physical Therapy at d/c to increase the patient's independence. Please see assessment section for further patient specific details. If patient discharges prior to next session this note will serve as a discharge summary. Please see below for the latest assessment towards goals. PT Equipment Recommendations  Equipment Needed: No      Patient Diagnosis(es): The encounter diagnosis was Aspiration into airway, initial encounter. Past Medical History:  has a past medical history of COVID-19. Past Surgical History:  has no past surgical history on file. Assessment   Assessment: Per patient's family report that patients functional mobility has signficantly decreased since being diagnosed with COVID on . Pt was unable to tolerate and transfers and was only able to do bed mobility with Max Ax2. PT is Skilled IP therapy in order to improve safety and mobility. IF pt is to return home, pt needs 24 hr capable assistance.   Treatment Diagnosis: Decreased mobility and endurance  Therapy Prognosis: Fair  Decision Making: Medium Complexity  Requires PT Follow-Up: Yes  Activity Tolerance  Activity Tolerance: Patient limited by pain     Plan   Plan  Plan:  ((2-5))  Current Treatment Recommendations: Strengthening,ROM,Functional mobility training,Transfer training,Patient/Caregiver education & training,Pain management,Gait training,Stair training,Endurance training  Safety Devices  Type of Devices: All fall risk precautions in place,Bed alarm in place,Call light within reach,Patient at risk for falls,Left in bed,Nurse notified     Restrictions  Position Activity Restriction  Other position/activity restrictions: up with assist     Subjective   General  Chart Reviewed: Yes  Patient assessed for rehabilitation services?: Yes  Additional Pertinent Hx: Pt is an 80year old male that presents from home to the ED from c/o of a fall from weakness due to COVID + diagnosis on 6/9. Chest CT: Right lower lobe bronchovascular crowding and airspace disease, small pneumonia with adjacent pleural thickening. PMH: Only covid listed due to pt unable to state history. Family / Caregiver Present: Yes  Diagnosis: Aspiration into Airway. Subjective  Subjective: Pt found supine in bed, pt was agreeable to therapy. All the pt's children were present and able to take his social history. Social/Functional History  Social/Functional History  Lives With: Alone,Daughter  Type of Home: House  Home Layout: One level,Able to Live on Main level with bedroom/bathroom  Home Access: Stairs to enter with rails  Entrance Stairs - Number of Steps: 2  Entrance Stairs - Rails: Both  Bathroom Shower/Tub: Walk-in shower  Bathroom Equipment: Grab bars in shower,Shower chair  Bathroom Accessibility: Wheelchair accessible  Home Equipment: Walker, rolling,Cane  Has the patient had two or more falls in the past year or any fall with injury in the past year?: Yes  Receives Help From: Family  ADL Assistance: Independent  Toileting: Independent  Homemaking Assistance: Independent  Ambulation Assistance: Needs assistance  Transfer Assistance: Independent  Active : No  Occupation: Retired  Additional Comments: History was taken by daughter present in the room. Pt was living independently before the fall and is planning to go home with daughter who has a handicap suite in her house. Vision/Hearing  Vision  Vision: Within Functional Limits  Hearing  Hearing: Exceptions to [facility]      Cognition   Orientation  Overall Orientation Status: Impaired  Orientation Level: Oriented to person;Disoriented to place; Disoriented to time;Disoriented to situation     Objective   Gross Assessment  AROM: Grossly decreased, non-functional  Strength: Grossly decreased, non-functional     Bed Mobility Training  Bed Mobility Training: Yes  Overall Level of Assistance: Maximum assistance  Supine to Sit: Moderate assistance;Assist X2  Sit to Supine: Maximum assistance;Assist X2  Transfer Training  Transfer Training:  (unable)     Bed mobility  Rolling to Left: Maximum assistance;2 Person assistance  Supine to Sit: 2 Person assistance;Maximum assistance  Sit to Supine: Maximum assistance;2 Person assistance  Scooting: Maximal assistance     Transfers  Sit to Stand: Unable to assess  Stand to sit: Unable to assess  Squat Pivot Transfers: Unable to assess  Ambulation  WB Status: Unable to assess     Balance  Sitting - Static: Fair;+  Sitting - Dynamic: Poor           OutComes Score    AM-PAC Score  AM-PAC Inpatient Mobility Raw Score : 6 (06/20/22 1531)  AM-PAC Inpatient T-Scale Score : 23.55 (06/20/22 1531)  Mobility Inpatient CMS 0-100% Score: 100 (06/20/22 1531)  Mobility Inpatient CMS G-Code Modifier : CN (06/20/22 1531)          Goals  Short Term Goals  Time Frame for Short term goals: discharge  Short term goal 1: Rolling Left Mod A  Short term goal 2: Supine to sit Mod A  Short term goal 3: Sitting balance for 8 minutes with no LOB  Patient Goals   Patient goals : Not stated       Education  Patient Education  Education Given To: Patient; Family  Education Provided: Role of Therapy;Plan of Care  Education Method: Verbal  Barriers to Learning: Cognition  Education Outcome: Verbalized understanding      Therapy Time   Individual Concurrent Group Co-treatment   Time In 1400         Time Out 1423         Minutes 23 Timed Code Treatment Minutes:   8    Total Treatment Minutes:  21         Komal Coelho, PT

## 2022-06-20 NOTE — PROGRESS NOTES
Speech Language Pathology  Treatment attempt      Chart reviewed. Attempted to see pt this date to address dysphagia, but pt is NPO for GI consult. Will attempt again later or tomorrow as appropriate. Discussed with RN, Audi Kennedy.     Freddy Perry, [facility] Leah Mukherjee 40  Speech-Language Pathologist  Pager [phone]

## 2022-06-20 NOTE — PROCEDURES
EGD REPORT    Patient:  Tony Apodaca                  1935    Referring Physician:  Corrie Sheldon    Endoscopist: Talisha Melendez     Indication:  Dysphagia; abnormal CT     Medications:  MAC      Pre-Anesthesia Assessment:  I have reviewed and am in agreement with patient history and medication, including previous response to sedation. Prior to the procedure, a History and Physical was performed, and patient medications and allergies were reviewed. The patient is competent. The risks and benefits of the procedure and the sedation options and risks were discussed with the patient. Risks discussed included but were not limited to infection, bleeding, perforation, death, and missed lesions. All questions were answered and informed consent was obtained. Patient identification and proposed procedure were verified by the physician and the nurse in the pre-procedure area in the procedure room. Mallampatti: II  ASA Grade Assessment: 3     After reviewing the risks and benefits, the patient was deemed in satisfactory condition to undergo the procedure. The anesthesia plan was to use MAC anesthesia. Immediately prior to administration of medications, the patient was re-assessed for adequacy to receive sedatives and a time out was performed. Patient and healthcare providers were in agreement it was the correct patient and procedure. The heart rate, respiratory rate, oxygen saturations, blood pressure, adequacy of pulmonary ventilation, and response to care were monitored throughout the procedure. The physical status of the patient was re-assessed after the procedure. After obtaining informed consent, the endoscope was passed under direct vision. The endoscope was introduced through the mouth, and advanced to the second part of duodenum. The EGD was accomplished without difficulty. The patient tolerated the procedure well.      Copious amount of tenacious mucus in posterior oropharynx which was suctioned out at beginning of the procedure  Duodenum: Normal  Stomach: normal mucosa. Retroflexion did show a large hiatal hernia from 30 to 45cms. I used transillumination several times and position changes of the patient and no light could be seen in the abdominal wall  Esophagus: upper end of gastric folds at 30cms with an ulcer and biopsies obtained. 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  If unable to swallow well, will need surgery to place a PEG as cannot see light to do it via EGD  If ulceration shows Brito's or dysplasia will repeat EGD in 4 weeks after high dose PPI  Call back with questions/concerns

## 2022-06-21 LAB
ALBUMIN SERPL-MCNC: 2.6 G/DL (ref 3.4–5)
ANION GAP SERPL CALCULATED.3IONS-SCNC: 9 MMOL/L (ref 3–16)
BASOPHILS ABSOLUTE: 0 K/UL (ref 0–0.2)
BASOPHILS RELATIVE PERCENT: 0.2 %
BUN BLDV-MCNC: 20 MG/DL (ref 7–20)
CALCIUM SERPL-MCNC: 7.9 MG/DL (ref 8.3–10.6)
CHLORIDE BLD-SCNC: 110 MMOL/L (ref 99–110)
CO2: 22 MMOL/L (ref 21–32)
CREAT SERPL-MCNC: 1.7 MG/DL (ref 0.8–1.3)
EOSINOPHILS ABSOLUTE: 0.1 K/UL (ref 0–0.6)
EOSINOPHILS RELATIVE PERCENT: 1 %
GFR AFRICAN AMERICAN: 46
GFR NON-AFRICAN AMERICAN: 38
GLUCOSE BLD-MCNC: 102 MG/DL (ref 70–99)
GLUCOSE BLD-MCNC: 106 MG/DL (ref 70–99)
HCT VFR BLD CALC: 36.5 % (ref 40.5–52.5)
HEMOGLOBIN: 11.8 G/DL (ref 13.5–17.5)
KAPPA, FREE LIGHT CHAINS, SERUM: 107.32 MG/L (ref 3.3–19.4)
KAPPA/LAMBDA RATIO: 2.32 (ref 0.26–1.65)
KAPPA/LAMBDA TEST COMMENT: ABNORMAL
LAMBDA, FREE LIGHT CHAINS, SERUM: 46.21 MG/L (ref 5.71–26.3)
LYMPHOCYTES ABSOLUTE: 0.4 K/UL (ref 1–5.1)
LYMPHOCYTES RELATIVE PERCENT: 5.3 %
MCH RBC QN AUTO: 28.2 PG (ref 26–34)
MCHC RBC AUTO-ENTMCNC: 32.4 G/DL (ref 31–36)
MCV RBC AUTO: 87.2 FL (ref 80–100)
MONOCYTES ABSOLUTE: 0.7 K/UL (ref 0–1.3)
MONOCYTES RELATIVE PERCENT: 8.5 %
NEUTROPHILS ABSOLUTE: 7.1 K/UL (ref 1.7–7.7)
NEUTROPHILS RELATIVE PERCENT: 85 %
PDW BLD-RTO: 15.1 % (ref 12.4–15.4)
PERFORMED ON: ABNORMAL
PHOSPHORUS: 2.9 MG/DL (ref 2.5–4.9)
PLATELET # BLD: 237 K/UL (ref 135–450)
PMV BLD AUTO: 7.8 FL (ref 5–10.5)
POTASSIUM REFLEX MAGNESIUM: 4 MMOL/L (ref 3.5–5.1)
POTASSIUM SERPL-SCNC: 4 MMOL/L (ref 3.5–5.1)
RBC # BLD: 4.18 M/UL (ref 4.2–5.9)
SODIUM BLD-SCNC: 141 MMOL/L (ref 136–145)
WBC # BLD: 8.4 K/UL (ref 4–11)

## 2022-06-21 PROCEDURE — 92526 ORAL FUNCTION THERAPY: CPT

## 2022-06-21 PROCEDURE — 85025 COMPLETE CBC W/AUTO DIFF WBC: CPT

## 2022-06-21 PROCEDURE — 80069 RENAL FUNCTION PANEL: CPT

## 2022-06-21 PROCEDURE — 6360000002 HC RX W HCPCS: Performed by: INTERNAL MEDICINE

## 2022-06-21 PROCEDURE — 36415 COLL VENOUS BLD VENIPUNCTURE: CPT

## 2022-06-21 PROCEDURE — 1200000000 HC SEMI PRIVATE

## 2022-06-21 PROCEDURE — C9113 INJ PANTOPRAZOLE SODIUM, VIA: HCPCS | Performed by: INTERNAL MEDICINE

## 2022-06-21 PROCEDURE — 6370000000 HC RX 637 (ALT 250 FOR IP): Performed by: INTERNAL MEDICINE

## 2022-06-21 PROCEDURE — 94761 N-INVAS EAR/PLS OXIMETRY MLT: CPT

## 2022-06-21 PROCEDURE — 2580000003 HC RX 258: Performed by: INTERNAL MEDICINE

## 2022-06-21 PROCEDURE — 2700000000 HC OXYGEN THERAPY PER DAY

## 2022-06-21 RX ADMIN — SODIUM CHLORIDE 3000 MG: 900 INJECTION INTRAVENOUS at 01:14

## 2022-06-21 RX ADMIN — Medication 5 MG: at 21:57

## 2022-06-21 RX ADMIN — SODIUM CHLORIDE 3000 MG: 900 INJECTION INTRAVENOUS at 10:34

## 2022-06-21 RX ADMIN — SODIUM CHLORIDE 3000 MG: 900 INJECTION INTRAVENOUS at 17:00

## 2022-06-21 RX ADMIN — ENOXAPARIN SODIUM 40 MG: 100 INJECTION SUBCUTANEOUS at 10:30

## 2022-06-21 RX ADMIN — SODIUM CHLORIDE, PRESERVATIVE FREE 10 ML: 5 INJECTION INTRAVENOUS at 10:30

## 2022-06-21 RX ADMIN — SODIUM CHLORIDE 25 ML: 9 INJECTION, SOLUTION INTRAVENOUS at 23:26

## 2022-06-21 RX ADMIN — PANTOPRAZOLE SODIUM 40 MG: 40 INJECTION, POWDER, LYOPHILIZED, FOR SOLUTION INTRAVENOUS at 10:30

## 2022-06-21 RX ADMIN — SODIUM CHLORIDE, PRESERVATIVE FREE 10 ML: 5 INJECTION INTRAVENOUS at 21:56

## 2022-06-21 RX ADMIN — SODIUM CHLORIDE 3000 MG: 900 INJECTION INTRAVENOUS at 04:45

## 2022-06-21 RX ADMIN — SODIUM CHLORIDE 3000 MG: 900 INJECTION INTRAVENOUS at 23:27

## 2022-06-21 RX ADMIN — PANTOPRAZOLE SODIUM 40 MG: 40 INJECTION, POWDER, LYOPHILIZED, FOR SOLUTION INTRAVENOUS at 21:57

## 2022-06-21 ASSESSMENT — PAIN SCALES - GENERAL
PAINLEVEL_OUTOF10: 0
PAINLEVEL_OUTOF10: 0

## 2022-06-21 NOTE — PROGRESS NOTES
Speech Language Pathology  Facility/Department: [address]  Dysphagia Daily Treatment Note    NAME: Bob Putnam  : 1935  MRN: [id number]    Patient Diagnosis(es):   Patient Active Problem List    Diagnosis Date Noted    Acute aspiration pneumonia ([address] 2022    Acute respiratory failure with hypoxia ([address] 2022    COVID-19 viremia - Tested positive 2022    Generalized weakness 2022     Allergies: No Known Allergies  Onset Date: 22  Current Diet Level:  Minced and Moist, Moderately/Honey Thick Liquids      Recent CXR: 2022  Impression   1. Cardiomegaly and aortic tortuosity   2. Minimal hazy density right lung base, differential radiolucency of the lungs is secondary to patient rotation   3. Hypertrophic osteophytes of the shoulders, left greater than right         CT of Chest: 2022  Impression   1. Right lower lobe bronchovascular crowding and airspace disease, small pneumonia with adjacent pleural thickening   2. Large hiatal hernia   3. Cardiomegaly with coronary artery calcification   4. Vertebral body fracture of L1 is age indeterminate. Previous MBS (22)    Chart reviewed. Medical Diagnosis: Aspiration into airway, initial encounter [T17.908A]  Acute aspiration pneumonia ([address] [J69.0]   Treatment Diagnosis: Dysphagia    BSE Impression (22)-  Dysphagia Impression : Needs further assessment. Oral phase dysphagia, with suspected pharyngeal dysphagia. Oral motor exam grossly intact. With pt seated up in bed, on 2 L O2 via nc, upper dentures in place, assessed tolerance ice chips, thins via tsp/straw/cup edge, puree, soft solid. Pt with positive oral acceptance, slowed oral prep, positive swallow movement, good oral clearance. Inconsistent dry cough. Recommend completion of MBS to further assess pharyngeal function and better determine diet recommendations.   Dysphagia Diagnosis: Suspected needs further assessment    MBS results (6/17/22)-  Impressions:  Oral phase: Mild oral dysphagia characterized by slowed mastication, slowed A-P transit, and reduced posterior oral containment with premature loss of bolus.      Pharyngeal phase: Moderate pharyngeal dysphagia characterized by impaired sensation, timing, and hyolaryngeal mechanics resulting in premature spillage of liquids, delayed and reduced anterior laryngeal movement and epiglottic retraction, with compromised airway protection. Resulted in deep penetration and subsequent tracheal aspiration (intermittently silent) of thin and nectar thick liquids. Aspiration occurred both during and after swallow; chin tuck did not prevent, and cough did not clear. No aspiration visualized for honey thick liquids, puree, or soft solid. Reduced tongue base retraction and pharyngeal constriction resulted in mild diffuse vallecular/pyriform stasis; cleared with subsequent swallows.     Upper Esophageal Screen: Reduced cricopharyngeal opening in setting of reduced anterior hyolaryngeal movement. Pain: None expressed    Current Diet : ADULT DIET; Dysphagia - Minced and Moist; Moderately Thick (Honey)   Recommended Form of Meds: Crushed in puree as able  Compensatory Swallowing Strategies : Upright as possible for all oral intake,Eat/Feed slowly,Alternate solids and liquids,Swallow 2 times per bite/sip,Effortful swallow,Set up assist,External pacing,Small bites/sips     Treatment:  Pt seen bedside to address the following goals:  Short-term Goals  Timeframe for Short-term Goals: 1-2 wks or LOS  Goal 1: Patient will participate in further assessment of swallow function as appropriate. 6/21:  Goal met. MBS completed 6/17 with results listed above. D/C goal.     Goal 2: Patient/caregiver will demonstrate understanding of swallowing concerns/recommendations. 6/21: SLP discussed current diet level following recent MBS.  Pt with limited comprehension of strategies/diet level despite mod verbal/visual cues. No family present to discuss. Cont. New Goal: Pt will tolerate recommended diet with s/s penetration/aspiration and adequate use of swallow strategies. 6/21: Pt agreeable to trial moderately thick liquids(MTL) at bedside. Pt seated upright with reduced siphon from spoon. SLP instructed pt to create tight labial seal around spoon. Pt followed directions for increased accuracy, however continued mod cues required for labial seal. Pt completed x15 effortful swallows with MTL with no overt s/s penetration/aspiration. Audible swallow noted. Cont. Patient/Family/Caregiver Education:  Educated re: rationale for diet level, strategies to use. Compensatory Strategies:  Compensatory Swallowing Strategies : Upright as possible for all oral intake,Eat/Feed slowly,Alternate solids and liquids,Swallow 2 times per bite/sip,Effortful swallow,Set up assist,External pacing,Small bites/sips      Plan:  Continue dysphagia treatment with goals per plan of care. Diet recommendations:  Dysphagia Minced & Moist Solids, Moderately Thick (Honey) Liquids (no straws), meds in puree  - upright position, small bites/sips, slow rate  - supervision/assistance as needed with meals  - oral hygiene x2 daily     DC recommendation: TBD  Treatment: 15  D/W nursing: Elidia  Needs met prior to leaving room, call button in reach. Electronically signed by:  Ramila Randall M.A., [address] [place] [address]  Speech-Language Pathologist  Pg.  # [phone]    If patient is discharged prior to next treatment, this note will serve as the discharge summary

## 2022-06-21 NOTE — CARE COORDINATION
CM met with pt and family at bedside. Family reported not wanting to return to [address] [place] [address] Road due to poor care. CM informed them that [facility] is reviewing, but may not be able to accept due to insurance. [facility] provided [facility] SNF list and sent the following referrals:    [facility]        Electronically signed by EDUARD Carroll, JAKOBW on 6/21/2022 at 5:26 PM  ___________________________________________________________    CM following. Pt is from [address] [place] [address], was there 1 day PTA. Pt was living at home alone prior to SNF, his children check in (from [address] [place] [address]). Pt not on home O2, currently on 2L while in hospital.     NEO spoke with Nguyễn Lane at [facility] [phone] to follow up on referral.  She reported that they are no longer in network with pt's insurance [facility]), but she will run it just in case and follow up with CM. NEO spoke with Frankie at [address] [place] [address] [phone] who reported that pt is able to return at [place]. CM left  for POA/daughter, Deleta Neighbours [phone].         Electronically signed by EDUARD Carroll, JAKOBW on 6/21/2022 at 12:24 PM

## 2022-06-21 NOTE — PLAN OF CARE
Problem: ABCDS Injury Assessment  Goal: Absence of physical injury  Outcome: Progressing  Flowsheets (Taken 6/21/2022 1200)  Absence of Physical Injury: Implement safety measures based on patient assessment     Problem: Respiratory - Adult  Goal: Achieves optimal ventilation and oxygenation  Outcome: Progressing     Problem: Discharge Planning  Goal: Discharge to home or other facility with appropriate resources  Outcome: Progressing  Flowsheets  Taken 6/21/2022 1221  Discharge to home or other facility with appropriate resources:   Identify barriers to discharge with patient and caregiver   Arrange for needed discharge resources and transportation as appropriate   Identify discharge learning needs (meds, wound care, etc)   Arrange for interpreters to assist at discharge as needed   Refer to discharge planning if patient needs post-hospital services based on physician order or complex needs related to functional status, cognitive ability or social support system  Taken 6/21/2022 1027  Discharge to home or other facility with appropriate resources:   Identify barriers to discharge with patient and caregiver   Arrange for needed discharge resources and transportation as appropriate   Identify discharge learning needs (meds, wound care, etc)   Arrange for interpreters to assist at discharge as needed   Refer to discharge planning if patient needs post-hospital services based on physician order or complex needs related to functional status, cognitive ability or social support system     Problem: Pain  Goal: Verbalizes/displays adequate comfort level or baseline comfort level  Outcome: Progressing  Flowsheets  Taken 6/21/2022 1221  Verbalizes/displays adequate comfort level or baseline comfort level:   Encourage patient to monitor pain and request assistance   Assess pain using appropriate pain scale   Administer analgesics based on type and severity of pain and evaluate response   Implement non-pharmacological measures as appropriate and evaluate response   Notify Licensed Independent Practitioner if interventions unsuccessful or patient reports new pain   Consider cultural and social influences on pain and pain management  Taken 6/21/2022 1027  Verbalizes/displays adequate comfort level or baseline comfort level:   Encourage patient to monitor pain and request assistance   Administer analgesics based on type and severity of pain and evaluate response   Assess pain using appropriate pain scale   Implement non-pharmacological measures as appropriate and evaluate response   Consider cultural and social influences on pain and pain management   Notify Licensed Independent Practitioner if interventions unsuccessful or patient reports new pain     Problem: Safety - Adult  Goal: Free from fall injury  Outcome: Progressing  Flowsheets (Taken 6/21/2022 1200)  Free From Fall Injury: Instruct family/caregiver on patient safety     Problem: Skin/Tissue Integrity  Goal: Absence of new skin breakdown  Description: 1. Monitor for areas of redness and/or skin breakdown  2. Assess vascular access sites hourly  3. Every 4-6 hours minimum:  Change oxygen saturation probe site  4. Every 4-6 hours:  If on nasal continuous positive airway pressure, respiratory therapy assess nares and determine need for appliance change or resting period.   Outcome: Progressing

## 2022-06-21 NOTE — PROGRESS NOTES
Interval History and plan:      Blood pressure is stable   Urine output is not measured. Plan:    Discontinue IV fluid encourage oral intake. Continue IV antibiotics. He is in stage IIIb chronic kidney disease since 2019. Creatinine is currently at baseline. He has 800 mg / day of proteinuria   I will check free light chain, SPEP and UPEP. Assessment :     Stage IIIb chronic kidney disease: He presented with a creatinine of 1.7. He has baseline creatinine fluctuates between 2.5-1.5. He has kidney disease since 2019. Anemia: Hemoglobin is 12 and there is no need for Procrit. Aspiration pneumonia/COVID-19: CT chest shows right lower lobe crowding and airspace disease. [facility] would like to thank Kate Cervantes MD   for opportunity to serve this patient      Please call with questions at-   24 Hrs Answering service [phone] or  7 am- 5 pm via Perfect serve or cell phone  Avelino Ortez MD          CC/reason for consult :     Renal insufficiency     HPI :     Luis Pate is a 80 y.o. male presented to   the hospital on 6/16/2022 with aspiration pneumonia    He has past medical history significant for chronic atrial fibrillation on Eliquis, aortic valve stenosis, chronic kidney disease stage III, history of COVID-19 pneumonia, rhabdomyolysis, severe protein calorie malnourishment. He is now admitted with aspiration pneumonia and is on antibiotics. He is also on isolation for COVID-19. Creatinine on arrival was 2.4. We are called for further management of renal insufficiency.     ROS:     Seen with-family in the room    positives in bold   Labs were obtained                All other remaining systems are negative or unable to obtain        PMH/PSH/SH/Family History:     Past Medical History:   Diagnosis Date    COVID-19 06/10/2022    [facility]       Past Surgical History:   Procedure Laterality Date    UPPER GASTROINTESTINAL ENDOSCOPY N/A 6/20/2022    EGD BIOPSY performed by Jacquelyn Posey MD at [place] [address]        reports that he has never smoked. He has never used smokeless tobacco. He reports previous alcohol use. He reports that he does not use drugs. family history is not on file. Medication:     Current Facility-Administered Medications: pantoprazole (PROTONIX) injection 40 mg, 40 mg, IntraVENous, BID  melatonin disintegrating tablet 5 mg, 5 mg, Oral, Nightly  ampicillin-sulbactam (UNASYN) 3000 mg in 100 mL NS IVPB minibag, 3,000 mg, IntraVENous, Q6H  sodium chloride flush 0.9 % injection 10 mL, 10 mL, IntraVENous, 2 times per day  sodium chloride flush 0.9 % injection 10 mL, 10 mL, IntraVENous, PRN  0.9 % sodium chloride infusion, , IntraVENous, PRN  enoxaparin (LOVENOX) injection 40 mg, 40 mg, SubCUTAneous, Daily  ondansetron (ZOFRAN) injection 4 mg, 4 mg, IntraVENous, Q4H PRN  polyethylene glycol (GLYCOLAX) packet 17 g, 17 g, Oral, Daily PRN  acetaminophen (TYLENOL) tablet 650 mg, 650 mg, Oral, Q4H PRN **OR** acetaminophen (TYLENOL) suppository 650 mg, 650 mg, Rectal, Q4H PRN       Vitals :     Vitals:    06/21/22 0443   BP: (!) 115/7   Pulse: 89   Resp: 16   Temp: 98.4 °F (36.9 °C)   SpO2: 100%       I & O :       Intake/Output Summary (Last 24 hours) at 6/21/2022 0918  Last data filed at 6/21/2022 0724  Gross per 24 hour   Intake 780 ml   Output 700 ml   Net 80 ml        Physical Examination :     Minimal exam was performed to minimize exposure to COVID-19. Patient was seen and evaluated from the door.   Preserving PPE       LABS:     Recent Labs     06/18/22  0923 06/19/22  1444 06/20/22  0926   WBC 9.1 7.7 7.8   HGB 12.9* 11.9* 12.2*   HCT 38.4* 36.4* 36.8*    237 see below     Recent Labs     06/18/22  0923 06/19/22  1444 06/20/22  0926    144 142   K 3.3* 3.7 3.4*   * 112* 110   CO2 18* 20* 21   BUN 34* 25* 21*   CREATININE 1.6* 1.7* 1.6*   GLUCOSE 100* 122* 83   MG 1.90  --  1.70* Nephrology  [address], [address] [facility]  Office: [phone]  Fax: [phone]

## 2022-06-22 LAB
ALBUMIN SERPL-MCNC: 2.1 G/DL (ref 3.1–4.9)
ALPHA-1-GLOBULIN: 0.3 G/DL (ref 0.2–0.4)
ALPHA-2-GLOBULIN: 0.6 G/DL (ref 0.4–1.1)
ANION GAP SERPL CALCULATED.3IONS-SCNC: 10 MMOL/L (ref 3–16)
BASOPHILS ABSOLUTE: 0 K/UL (ref 0–0.2)
BASOPHILS RELATIVE PERCENT: 0.3 %
BETA GLOBULIN: 0.8 G/DL (ref 0.9–1.6)
BUN BLDV-MCNC: 17 MG/DL (ref 7–20)
CALCIUM SERPL-MCNC: 7.6 MG/DL (ref 8.3–10.6)
CHLORIDE BLD-SCNC: 107 MMOL/L (ref 99–110)
CO2: 22 MMOL/L (ref 21–32)
CREAT SERPL-MCNC: 1.7 MG/DL (ref 0.8–1.3)
EOSINOPHILS ABSOLUTE: 0.1 K/UL (ref 0–0.6)
EOSINOPHILS RELATIVE PERCENT: 1.1 %
GAMMA GLOBULIN: 1 G/DL (ref 0.6–1.8)
GFR AFRICAN AMERICAN: 46
GFR NON-AFRICAN AMERICAN: 38
GLUCOSE BLD-MCNC: 99 MG/DL (ref 70–99)
HCT VFR BLD CALC: 33.3 % (ref 40.5–52.5)
HEMOGLOBIN: 11.2 G/DL (ref 13.5–17.5)
LYMPHOCYTES ABSOLUTE: 0.4 K/UL (ref 1–5.1)
LYMPHOCYTES RELATIVE PERCENT: 5 %
MAGNESIUM: 1.6 MG/DL (ref 1.8–2.4)
MAGNESIUM: 2.3 MG/DL (ref 1.8–2.4)
MCH RBC QN AUTO: 28.7 PG (ref 26–34)
MCHC RBC AUTO-ENTMCNC: 33.6 G/DL (ref 31–36)
MCV RBC AUTO: 85.4 FL (ref 80–100)
MONOCYTES ABSOLUTE: 0.6 K/UL (ref 0–1.3)
MONOCYTES RELATIVE PERCENT: 7.5 %
NEUTROPHILS ABSOLUTE: 7.2 K/UL (ref 1.7–7.7)
NEUTROPHILS RELATIVE PERCENT: 86.1 %
PDW BLD-RTO: 15.3 % (ref 12.4–15.4)
PLATELET # BLD: 217 K/UL (ref 135–450)
PMV BLD AUTO: 8.1 FL (ref 5–10.5)
POTASSIUM REFLEX MAGNESIUM: 3 MMOL/L (ref 3.5–5.1)
RBC # BLD: 3.9 M/UL (ref 4.2–5.9)
SODIUM BLD-SCNC: 139 MMOL/L (ref 136–145)
TOTAL PROTEIN: 4.8 G/DL (ref 6.4–8.2)
WBC # BLD: 8.4 K/UL (ref 4–11)

## 2022-06-22 PROCEDURE — C9113 INJ PANTOPRAZOLE SODIUM, VIA: HCPCS | Performed by: INTERNAL MEDICINE

## 2022-06-22 PROCEDURE — 92526 ORAL FUNCTION THERAPY: CPT

## 2022-06-22 PROCEDURE — 85025 COMPLETE CBC W/AUTO DIFF WBC: CPT

## 2022-06-22 PROCEDURE — 6360000002 HC RX W HCPCS: Performed by: INTERNAL MEDICINE

## 2022-06-22 PROCEDURE — 51702 INSERT TEMP BLADDER CATH: CPT

## 2022-06-22 PROCEDURE — 80048 BASIC METABOLIC PNL TOTAL CA: CPT

## 2022-06-22 PROCEDURE — 1200000000 HC SEMI PRIVATE

## 2022-06-22 PROCEDURE — 6370000000 HC RX 637 (ALT 250 FOR IP): Performed by: INTERNAL MEDICINE

## 2022-06-22 PROCEDURE — 2580000003 HC RX 258: Performed by: INTERNAL MEDICINE

## 2022-06-22 PROCEDURE — 83735 ASSAY OF MAGNESIUM: CPT

## 2022-06-22 PROCEDURE — 36415 COLL VENOUS BLD VENIPUNCTURE: CPT

## 2022-06-22 RX ORDER — POTASSIUM CHLORIDE 7.45 MG/ML
10 INJECTION INTRAVENOUS PRN
Status: DISCONTINUED | OUTPATIENT
Start: 2022-06-22 | End: 2022-07-05 | Stop reason: HOSPADM

## 2022-06-22 RX ORDER — POTASSIUM CHLORIDE 20 MEQ/1
40 TABLET, EXTENDED RELEASE ORAL PRN
Status: DISCONTINUED | OUTPATIENT
Start: 2022-06-22 | End: 2022-07-05 | Stop reason: HOSPADM

## 2022-06-22 RX ORDER — MAGNESIUM SULFATE 1 G/100ML
1000 INJECTION INTRAVENOUS PRN
Status: DISCONTINUED | OUTPATIENT
Start: 2022-06-22 | End: 2022-07-05 | Stop reason: HOSPADM

## 2022-06-22 RX ADMIN — SODIUM CHLORIDE 3000 MG: 900 INJECTION INTRAVENOUS at 23:11

## 2022-06-22 RX ADMIN — Medication 5 MG: at 20:30

## 2022-06-22 RX ADMIN — SODIUM CHLORIDE 25 ML: 9 INJECTION, SOLUTION INTRAVENOUS at 05:18

## 2022-06-22 RX ADMIN — MAGNESIUM SULFATE HEPTAHYDRATE 1000 MG: 1 INJECTION, SOLUTION INTRAVENOUS at 17:39

## 2022-06-22 RX ADMIN — POTASSIUM CHLORIDE 10 MEQ: 7.46 INJECTION, SOLUTION INTRAVENOUS at 20:29

## 2022-06-22 RX ADMIN — SODIUM CHLORIDE 3000 MG: 900 INJECTION INTRAVENOUS at 05:20

## 2022-06-22 RX ADMIN — SODIUM CHLORIDE, PRESERVATIVE FREE 10 ML: 5 INJECTION INTRAVENOUS at 10:37

## 2022-06-22 RX ADMIN — MAGNESIUM SULFATE HEPTAHYDRATE 1000 MG: 1 INJECTION, SOLUTION INTRAVENOUS at 18:40

## 2022-06-22 RX ADMIN — POTASSIUM CHLORIDE 10 MEQ: 7.46 INJECTION, SOLUTION INTRAVENOUS at 23:57

## 2022-06-22 RX ADMIN — SODIUM CHLORIDE 3000 MG: 900 INJECTION INTRAVENOUS at 10:37

## 2022-06-22 RX ADMIN — POTASSIUM CHLORIDE 10 MEQ: 7.46 INJECTION, SOLUTION INTRAVENOUS at 22:49

## 2022-06-22 RX ADMIN — ENOXAPARIN SODIUM 40 MG: 100 INJECTION SUBCUTANEOUS at 10:37

## 2022-06-22 RX ADMIN — SODIUM CHLORIDE 25 ML: 9 INJECTION, SOLUTION INTRAVENOUS at 23:57

## 2022-06-22 RX ADMIN — PANTOPRAZOLE SODIUM 40 MG: 40 INJECTION, POWDER, LYOPHILIZED, FOR SOLUTION INTRAVENOUS at 10:37

## 2022-06-22 RX ADMIN — POTASSIUM CHLORIDE 10 MEQ: 7.46 INJECTION, SOLUTION INTRAVENOUS at 21:26

## 2022-06-22 RX ADMIN — PANTOPRAZOLE SODIUM 40 MG: 40 INJECTION, POWDER, LYOPHILIZED, FOR SOLUTION INTRAVENOUS at 20:30

## 2022-06-22 NOTE — PROGRESS NOTES
Interval History and plan:      Blood pressure is stable   Urine output is 1750 ml  Stable creatinine 1.7    Plan:    Discontinue IV fluid encourage oral intake. He is in stage IIIb chronic kidney disease since 2019. Creatinine is currently at baseline. He has 800 mg / day of proteinuria   Free light chain ratio is 2.3                   Assessment :     Stage IIIb chronic kidney disease: He presented with a creatinine of 1.7. He has baseline creatinine fluctuates between 2.5-1.5. He has kidney disease since 2019. Anemia: Hemoglobin is 12 and there is no need for Procrit. Aspiration pneumonia/COVID-19: CT chest shows right lower lobe crowding and airspace disease. [facility] would like to thank Erin Halsted, MD   for opportunity to serve this patient      Please call with questions at-   24 Hrs Answering service [phone] or  7 am- 5 pm via Perfect serve or cell phone  Floyd Bradley MD          CC/reason for consult :     Renal insufficiency     HPI :     Milli Almeida is a 80 y.o. male presented to   the hospital on 6/16/2022 with aspiration pneumonia    He has past medical history significant for chronic atrial fibrillation on Eliquis, aortic valve stenosis, chronic kidney disease stage III, history of COVID-19 pneumonia, rhabdomyolysis, severe protein calorie malnourishment. He is now admitted with aspiration pneumonia and is on antibiotics. He is also on isolation for COVID-19. Creatinine on arrival was 2.4. We are called for further management of renal insufficiency.     ROS:     Seen with-family in the room    positives in bold   Labs were obtained                All other remaining systems are negative or unable to obtain        PMH/PSH/SH/Family History:     Past Medical History:   Diagnosis Date    COVID-19 06/10/2022    [facility]       Past Surgical History:   Procedure Laterality Date    UPPER GASTROINTESTINAL ENDOSCOPY N/A 6/20/2022    EGD BIOPSY performed by Paul Retana MD at [facility]        reports that he has never smoked. He has never used smokeless tobacco. He reports previous alcohol use. He reports that he does not use drugs. family history is not on file. Medication:     Current Facility-Administered Medications: pantoprazole (PROTONIX) injection 40 mg, 40 mg, IntraVENous, BID  melatonin disintegrating tablet 5 mg, 5 mg, Oral, Nightly  ampicillin-sulbactam (UNASYN) 3000 mg in 100 mL NS IVPB minibag, 3,000 mg, IntraVENous, Q6H  sodium chloride flush 0.9 % injection 10 mL, 10 mL, IntraVENous, 2 times per day  sodium chloride flush 0.9 % injection 10 mL, 10 mL, IntraVENous, PRN  0.9 % sodium chloride infusion, , IntraVENous, PRN  enoxaparin (LOVENOX) injection 40 mg, 40 mg, SubCUTAneous, Daily  ondansetron (ZOFRAN) injection 4 mg, 4 mg, IntraVENous, Q4H PRN  polyethylene glycol (GLYCOLAX) packet 17 g, 17 g, Oral, Daily PRN  acetaminophen (TYLENOL) tablet 650 mg, 650 mg, Oral, Q4H PRN **OR** acetaminophen (TYLENOL) suppository 650 mg, 650 mg, Rectal, Q4H PRN       Vitals :     Vitals:    06/22/22 0529   BP: 106/72   Pulse: 85   Resp: 17   Temp: 97.8 °F (36.6 °C)   SpO2: 96%       I & O :       Intake/Output Summary (Last 24 hours) at 6/22/2022 0853  Last data filed at 6/22/2022 0645  Gross per 24 hour   Intake 600 ml   Output 1250 ml   Net -650 ml        Physical Examination :     Minimal exam was performed to minimize exposure to COVID-19. Patient was seen and evaluated from the door.   Preserving PPE       LABS:     Recent Labs     06/19/22  1444 06/20/22  0926 06/21/22  0912   WBC 7.7 7.8 8.4   HGB 11.9* 12.2* 11.8*   HCT 36.4* 36.8* 36.5*    see below 237     Recent Labs     06/19/22  1444 06/19/22  1444 06/20/22  0926 06/21/22  0912     --  142 141   K 3.7   < > 3.4* 4.0  4.0   *  --  110 110   CO2 20*  --  21 22   BUN 25*  --  21* 20   CREATININE 1.7*  --  1.6* 1.7*   GLUCOSE 122*  --  83 106*   MG  -- --  1.70*  --    PHOS  --   --   --  2.9    < > = values in this interval not displayed.       Nephrology  [address] [facility], [address] [facility]  Office: [phone]  Fax: [phone]

## 2022-06-22 NOTE — PROGRESS NOTES
Pt eating approximately 25% of meals. Prefers to like to eat sweet items on tray such as yogurts and desserts. Does take po fluids well. Will cont to encourage po intake.

## 2022-06-22 NOTE — PROGRESS NOTES
Occupational Therapy  Attempt   Attempt to see for OT treatment. Pt currently with speech therapist. Will follow up as therapy schedule allows.      ANGELA Navarro, OTR/L

## 2022-06-22 NOTE — PROGRESS NOTES
Hospitalist Progress Note      PCP: Gerald Dawkins MD    Date of Admission: 6/16/2022    Chief Complaint: Aspiration Pneumonia    Hospital Course: H&P    Subjective: Patient seen and evaluated at the bedside, no acute events overnight, patient appears more alert and awake today, he is sitting more upright today, MBS did show multilevel aspiration    Per RN patient ate reasonable portion of breakfast, no acute events overnight, swallow eval again today  Medications:  Reviewed    Infusion Medications    sodium chloride 25 mL (06/22/22 0518)     Scheduled Medications    pantoprazole  40 mg IntraVENous BID    melatonin  5 mg Oral Nightly    ampicillin-sulbactam  3,000 mg IntraVENous Q6H    sodium chloride flush  10 mL IntraVENous 2 times per day    enoxaparin  40 mg SubCUTAneous Daily     PRN Meds: sodium chloride flush, sodium chloride, ondansetron, polyethylene glycol, acetaminophen **OR** acetaminophen      Intake/Output Summary (Last 24 hours) at 6/22/2022 1652  Last data filed at 6/22/2022 1555  Gross per 24 hour   Intake --   Output 1300 ml   Net -1300 ml       Physical Exam Performed:    /76   Pulse 88   Temp 97.7 °F (36.5 °C) (Oral)   Resp 18   Ht 5' 10\" (1.778 m)   Wt 156 lb (70.8 kg)   SpO2 97%   BMI 22.38 kg/m²     General appearance: NAD, lying in bed  HEENT: Pupils equal, round, and reactive to light. Conjunctivae/corneas clear. Neck: Supple, with full range of motion. No jugular venous distention. Trachea midline. Respiratory:  Normal respiratory effort. Clear to auscultation, bilaterally without Rales/Wheezes/Rhonchi. Cardiovascular: Regular rate and rhythm with normal S1/S2 without murmurs, rubs or gallops. Abdomen: Soft, non-tender, non-distended with normal bowel sounds. Musculoskeletal: No clubbing, cyanosis or edema bilaterally. Full range of motion without deformity. Skin: Skin color, texture, turgor normal.  No rashes or lesions.   Neurologic:  Neurovascularly intact without any focal sensory/motor deficits. Cranial nerves: II-XII intact, grossly non-focal.  Psychiatric: Alert and awake  Capillary Refill: Brisk,3 seconds, normal   Peripheral Pulses: +2 palpable, equal bilaterally       Labs:   Recent Labs     06/20/22 0926 06/21/22  0912 06/22/22  0834   WBC 7.8 8.4 8.4   HGB 12.2* 11.8* 11.2*   HCT 36.8* 36.5* 33.3*   PLT see below 237 217     Recent Labs     06/20/22  0926 06/20/22  0926 06/21/22  0912 06/22/22  0834     --  141 139   K 3.4*   < > 4.0  4.0 3.0*     --  110 107   CO2 21  --  22 22   BUN 21*  --  20 17   CREATININE 1.6*  --  1.7* 1.7*   CALCIUM 7.9*  --  7.9* 7.6*   PHOS  --   --  2.9  --     < > = values in this interval not displayed. No results for input(s): AST, ALT, BILIDIR, BILITOT, ALKPHOS in the last 72 hours. No results for input(s): INR in the last 72 hours. No results for input(s): Kaleen Hope in the last 72 hours. Urinalysis:      Lab Results   Component Value Date    NITRU Negative 06/20/2022    WBCUA 0-2 06/20/2022    RBCUA  06/20/2022    BLOODU LARGE 06/20/2022    SPECGRAV 1.025 06/20/2022    GLUCOSEU Negative 06/20/2022       Radiology:  FL MODIFIED BARIUM SWALLOW W VIDEO   Final Result      1. Multilevel episodes of tracheal aspiration as detailed above. CT CHEST WO CONTRAST   Final Result   1. Right lower lobe bronchovascular crowding and airspace disease, small pneumonia with adjacent pleural thickening   2. Large hiatal hernia   3. Cardiomegaly with coronary artery calcification   4. Vertebral body fracture of L1 is age indeterminate. XR CHEST PORTABLE   Final Result   1. Cardiomegaly and aortic tortuosity   2. Minimal hazy density right lung base, differential radiolucency of the lungs is secondary to patient rotation   3.  Hypertrophic osteophytes of the shoulders, left greater than right              Assessment/Plan:    Active Hospital Problems    Diagnosis     Acute aspiration pneumonia ([facility] [J69.0]      Priority: Medium    Acute respiratory failure with hypoxia ([facility] [J96.01]      Priority: Medium    COVID-19 viremia - Tested positive 06/09/2022 [U07.1]      Priority: Medium    Generalized weakness [R53.1]      Priority: Medium     Pneumonia, due to aspiration - Pt has been started on Unasyn  - Pt tested positive for the COVID-19 virus in 06/09/2022, and dos not appear to have COVID pneumonia  - Respiratory culture ordered  - Legionella pneumophilia and Strep pneumoniae urine antigens ordered     COVID-19 viremia - Tested positive 06/09/2022 - Provide symptomatic treatment     Acute respiratory failure with hypoxia (HCC) - due to aspiration pneumonia, not COVID. As evidenced by an oxygen saturation of less than 90% on room air. We will provide oxygen as needed to maintain an oxygen saturation of 92% or higher.     Aspiration   -Speech evaluated. -Recommend dysphagia minced&Moist solids, moderately Thick  MBS shows multilevel aspiration     Generalized weakness   - PT/OT to evaluate and treat patient, and if necessary to provide recommendations for post hospital therapy     Acute metabolic encephalopathy -we will provide supportive care    MARY vs CKD -improving  -Cr 2.45 on 6/11  -Continue IVF    DVT Prophylaxis: Lovenox  Diet: ADULT DIET;  Dysphagia - Minced and Moist; Moderately Thick (Honey)  Code Status: Full Code    PT/OT Eval Status: ordered    Dispo/plan- continue IV Unasyn, consulted GI, speech for [id number] with GI regarding final plan for EGD/PEG tube, GI mention patient will need surgery for PEG tube, is not candidate for EGD peg tube placement, Palliative care following nephrology continues to follow, likely DC 1-2 days    Mejia Morales MD

## 2022-06-22 NOTE — PROGRESS NOTES
Speech Language Pathology  Facility/Department: [address]  Dysphagia Daily Treatment Note    NAME: Elio Hu  : 1935  MRN: [id number]    Patient Diagnosis(es):   Patient Active Problem List    Diagnosis Date Noted    Acute aspiration pneumonia ([address] 2022    Acute respiratory failure with hypoxia ([address] 2022    COVID-19 viremia - Tested positive 2022    Generalized weakness 2022     Allergies: No Known Allergies  Onset Date: 22  Current Diet Level:  Minced and Moist, Moderately/Honey Thick Liquids      Recent CXR: 2022  Impression   1. Cardiomegaly and aortic tortuosity   2. Minimal hazy density right lung base, differential radiolucency of the lungs is secondary to patient rotation   3. Hypertrophic osteophytes of the shoulders, left greater than right         CT of Chest: 2022  Impression   1. Right lower lobe bronchovascular crowding and airspace disease, small pneumonia with adjacent pleural thickening   2. Large hiatal hernia   3. Cardiomegaly with coronary artery calcification   4. Vertebral body fracture of L1 is age indeterminate. Previous MBS (22)    Chart reviewed. Medical Diagnosis: Aspiration into airway, initial encounter [T17.908A]  Acute aspiration pneumonia ([address] [J69.0]   Treatment Diagnosis: Dysphagia    BSE Impression (22)-  Dysphagia Impression : Needs further assessment. Oral phase dysphagia, with suspected pharyngeal dysphagia. Oral motor exam grossly intact. With pt seated up in bed, on 2 L O2 via nc, upper dentures in place, assessed tolerance ice chips, thins via tsp/straw/cup edge, puree, soft solid. Pt with positive oral acceptance, slowed oral prep, positive swallow movement, good oral clearance. Inconsistent dry cough. Recommend completion of MBS to further assess pharyngeal function and better determine diet recommendations.   Dysphagia Diagnosis: Suspected needs further assessment    MBS results (6/17/22)-  Impressions:  Oral phase: Mild oral dysphagia characterized by slowed mastication, slowed A-P transit, and reduced posterior oral containment with premature loss of bolus.      Pharyngeal phase: Moderate pharyngeal dysphagia characterized by impaired sensation, timing, and hyolaryngeal mechanics resulting in premature spillage of liquids, delayed and reduced anterior laryngeal movement and epiglottic retraction, with compromised airway protection. Resulted in deep penetration and subsequent tracheal aspiration (intermittently silent) of thin and nectar thick liquids. Aspiration occurred both during and after swallow; chin tuck did not prevent, and cough did not clear. No aspiration visualized for honey thick liquids, puree, or soft solid. Reduced tongue base retraction and pharyngeal constriction resulted in mild diffuse vallecular/pyriform stasis; cleared with subsequent swallows.     Upper Esophageal Screen: Reduced cricopharyngeal opening in setting of reduced anterior hyolaryngeal movement. Pain: None expressed    Current Diet : ADULT DIET; Dysphagia - Minced and Moist; Moderately Thick (Honey)   Recommended Form of Meds: Crushed in puree as able  Compensatory Swallowing Strategies : Upright as possible for all oral intake,Eat/Feed slowly,Alternate solids and liquids,Swallow 2 times per bite/sip,Effortful swallow,Set up assist,External pacing,Small bites/sips     Treatment:  Pt seen bedside to address the following goals:  Short-term Goals  Timeframe for Short-term Goals: 1-2 wks or LOS  Goal 1: Patient will participate in further assessment of swallow function as appropriate. 6/21:  Goal met. MBS completed 6/17 with results listed above. D/C goal.     Goal 2: Patient/caregiver will demonstrate understanding of swallowing concerns/recommendations. 6/21: SLP discussed current diet level following recent MBS.  Pt with limited comprehension of strategies/diet level despite mod verbal/visual cues. No family present to discuss. Cont.   6/22: Provided ongoing education regarding swallow fx  / dysphagia including aspiration. Suspect pt with limited comprehension. Continue goal.    New Goal: Pt will tolerate recommended diet with s/s penetration/aspiration and adequate use of swallow strategies. 6/21: Pt agreeable to trial moderately thick liquids(MTL) at bedside. Pt seated upright with reduced siphon from spoon. SLP instructed pt to create tight labial seal around spoon. Pt followed directions for increased accuracy, however continued mod cues required for labial seal. Pt completed x15 effortful swallows with MTL with no overt s/s penetration/aspiration. Audible swallow noted. Cont.   6/22: Pt provided moderately thick liquids via cup, minced hamburger and puree. He demonstrates reduced labial seal for stripping of the spoon, slowed mastication but full oral clearance of single bite minced meat. He defers further solid trials. With moderately thick liquid, pt drank nearly entire 4 oz consecutively despite mod/max cues from SLP. No overt s/s of aspiration or penetration. Education provided regarding importance of strategies to reduce aspiration and subsequent PNA. Pt declined participation in pharyngeal exercises this date. Patient/Family/Caregiver Education:  Educated re: rationale for diet level, strategies to use. Compensatory Strategies:  Compensatory Swallowing Strategies : Upright as possible for all oral intake,Eat/Feed slowly,Alternate solids and liquids,Swallow 2 times per bite/sip,Effortful swallow,Set up assist,External pacing,Small bites/sips      Plan:  Continue dysphagia treatment with goals per plan of care. Diet recommendations:  Dysphagia Minced & Moist Solids, Moderately Thick (Honey) Liquids (no straws), meds in puree.  Encourage PO intake  - upright position, small bites/sips, slow rate  - supervision/assistance as needed with meals  - oral hygiene x2 daily     DC recommendation: TBD  Treatment: 22  D/W nursing: April  Needs met prior to leaving room, call button in reach. Electronically signed by:  Anuj Kothari, [address] [facility], [id number]    Speech-Language Pathologist  Pg.  # [phone]    If patient is discharged prior to next treatment, this note will serve as the discharge summary

## 2022-06-22 NOTE — ACP (ADVANCE CARE PLANNING)
Advance Care Planning     Advance Care Planning Inpatient Note  [facility] Department    Today's Date: 6/22/2022  Unit: [address]    Received request from patient. Upon review of chart and communication with care team, patient's decision making abilities are not in question. . Patient and Child/Children was/were present in the room during visit. Goals of ACP Conversation:  Discuss advance care planning documents    Health Care Decision Makers:       Primary Decision Maker: Nayla Moreno - [phone]    Secondary Decision Maker: Harshad Baker - Child - [phone]    Supplemental (Other) Decision Maker: Jazmine Mcneil - Child - [phone]    Summary:  Completed [address]    Advance Care Planning Documents (Patient Wishes):  Healthcare Power of /Advance Directive Appointment of Health Care Agent     Assessment  N/A     Interventions:  Provided education on documents for clarity and greater understanding  Assisted in the completion of documents according to patient's wishes at this time  Encouraged ongoing ACP conversation with future decision makers and loved ones    Care Preferences Communicated:   No    Outcomes/Plan:  ACP Discussion: Completed  New advance directive completed. Returned original document(s) to patient, as well as copies for distribution to appointed agents  Copy of advance directive given to staff to scan into medical record.     Electronically [facility] on 6/22/2022 at 12:39 PM

## 2022-06-22 NOTE — PROGRESS NOTES
Hospitalist Progress Note      PCP: Amy Nguyen MD    Date of Admission: 6/16/2022    Chief Complaint: Aspiration Pneumonia    Hospital Course: H&P    Subjective: Patient seen and evaluated at the bedside, no acute events overnight, patient appears more alert and awake today, he is sitting more upright today, MBS did show multilevel aspiration  Medications:  Reviewed    Infusion Medications    sodium chloride 200 mL/hr at 06/20/22 1616     Scheduled Medications    pantoprazole  40 mg IntraVENous BID    melatonin  5 mg Oral Nightly    ampicillin-sulbactam  3,000 mg IntraVENous Q6H    sodium chloride flush  10 mL IntraVENous 2 times per day    enoxaparin  40 mg SubCUTAneous Daily     PRN Meds: sodium chloride flush, sodium chloride, ondansetron, polyethylene glycol, acetaminophen **OR** acetaminophen      Intake/Output Summary (Last 24 hours) at 6/21/2022 2011  Last data filed at 6/21/2022 1614  Gross per 24 hour   Intake 1280 ml   Output 700 ml   Net 580 ml       Physical Exam Performed:    /73   Pulse (!) 104   Temp 98.2 °F (36.8 °C) (Oral)   Resp 18   Ht 5' 10\" (1.778 m)   Wt 156 lb (70.8 kg)   SpO2 98%   BMI 22.38 kg/m²     General appearance: NAD, on room air, appears comfortable,  HEENT: Pupils equal, round, and reactive to light. Conjunctivae/corneas clear. Neck: Supple, with full range of motion. No jugular venous distention. Trachea midline. Respiratory:  Normal respiratory effort. Clear to auscultation, bilaterally without Rales/Wheezes/Rhonchi. Cardiovascular: Regular rate and rhythm with normal S1/S2 without murmurs, rubs or gallops. Abdomen: Soft, non-tender, non-distended with normal bowel sounds. Musculoskeletal: No clubbing, cyanosis or edema bilaterally. Full range of motion without deformity. Skin: Skin color, texture, turgor normal.  No rashes or lesions. Neurologic:  Neurovascularly intact without any focal sensory/motor deficits.  Cranial nerves: II-XII intact, grossly non-focal.  Psychiatric: Alert and awake  Capillary Refill: Brisk,3 seconds, normal   Peripheral Pulses: +2 palpable, equal bilaterally       Labs:   Recent Labs     06/19/22  1444 06/20/22  0926 06/21/22 0912   WBC 7.7 7.8 8.4   HGB 11.9* 12.2* 11.8*   HCT 36.4* 36.8* 36.5*    see below 237     Recent Labs     06/19/22  1444 06/19/22  1444 06/20/22  0926 06/21/22  0912     --  142 141   K 3.7   < > 3.4* 4.0  4.0   *  --  110 110   CO2 20*  --  21 22   BUN 25*  --  21* 20   CREATININE 1.7*  --  1.6* 1.7*   CALCIUM 8.0*  --  7.9* 7.9*   PHOS  --   --   --  2.9    < > = values in this interval not displayed. No results for input(s): AST, ALT, BILIDIR, BILITOT, ALKPHOS in the last 72 hours. No results for input(s): INR in the last 72 hours. No results for input(s): [facility] in the last 72 hours. Urinalysis:      Lab Results   Component Value Date    NITRU Negative 06/20/2022    WBCUA 0-2 06/20/2022    RBCUA  06/20/2022    BLOODU LARGE 06/20/2022    SPECGRAV 1.025 06/20/2022    GLUCOSEU Negative 06/20/2022       Radiology:  FL MODIFIED BARIUM SWALLOW W VIDEO   Final Result      1. Multilevel episodes of tracheal aspiration as detailed above. CT CHEST WO CONTRAST   Final Result   1. Right lower lobe bronchovascular crowding and airspace disease, small pneumonia with adjacent pleural thickening   2. Large hiatal hernia   3. Cardiomegaly with coronary artery calcification   4. Vertebral body fracture of L1 is age indeterminate. XR CHEST PORTABLE   Final Result   1. Cardiomegaly and aortic tortuosity   2. Minimal hazy density right lung base, differential radiolucency of the lungs is secondary to patient rotation   3.  Hypertrophic osteophytes of the shoulders, left greater than right              Assessment/Plan:    Active Hospital Problems    Diagnosis     Acute aspiration pneumonia ([address] [J69.0]      Priority: Medium    Acute respiratory failure with hypoxia ([facility] [address] [J96.01]      Priority: Medium    COVID-19 viremia - Tested positive 06/09/2022 [U07.1]      Priority: Medium    Generalized weakness [R53.1]      Priority: Medium     Pneumonia, due to aspiration - Pt has been started on Unasyn  - Pt tested positive for the COVID-19 virus in 06/09/2022, and dos not appear to have COVID pneumonia  - Respiratory culture ordered  - Legionella pneumophilia and Strep pneumoniae urine antigens ordered     COVID-19 viremia - Tested positive 06/09/2022 - Provide symptomatic treatment     Acute respiratory failure with hypoxia (HCC) - due to aspiration pneumonia, not COVID. As evidenced by an oxygen saturation of less than 90% on room air. We will provide oxygen as needed to maintain an oxygen saturation of 92% or higher.     Aspiration   -Speech evaluated. -Recommend dysphagia minced&Moist solids, moderately Thick  MBS shows multilevel aspiration     Generalized weakness   - PT/OT to evaluate and treat patient, and if necessary to provide recommendations for post hospital therapy     Acute metabolic encephalopathy -we will provide supportive care    MRAY vs CKD -improving  -Cr 2.45 on 6/11  -Continue IVF    DVT Prophylaxis: Lovenox  Diet: ADULT DIET;  Dysphagia - Minced and Moist; Moderately Thick (Honey)  Code Status: Full Code    PT/OT Eval Status: ordered    Dispo/plan- continue IV Unasyn, consulted GI, speech for [id number] with GI regarding final plan for EGD/PEG tube, nephrology continues to follow, consult palliative care, PEG tube versus oral intake, await EGD    Luann Puentes MD

## 2022-06-23 LAB
ANION GAP SERPL CALCULATED.3IONS-SCNC: 12 MMOL/L (ref 3–16)
BASOPHILS ABSOLUTE: 0 K/UL (ref 0–0.2)
BASOPHILS RELATIVE PERCENT: 0.3 %
BUN BLDV-MCNC: 17 MG/DL (ref 7–20)
CALCIUM SERPL-MCNC: 7.5 MG/DL (ref 8.3–10.6)
CHLORIDE BLD-SCNC: 105 MMOL/L (ref 99–110)
CO2: 20 MMOL/L (ref 21–32)
CREAT SERPL-MCNC: 1.6 MG/DL (ref 0.8–1.3)
EOSINOPHILS ABSOLUTE: 0.1 K/UL (ref 0–0.6)
EOSINOPHILS RELATIVE PERCENT: 1 %
GFR AFRICAN AMERICAN: 50
GFR NON-AFRICAN AMERICAN: 41
GLUCOSE BLD-MCNC: 100 MG/DL (ref 70–99)
HCT VFR BLD CALC: 31.3 % (ref 40.5–52.5)
HEMOGLOBIN: 10.7 G/DL (ref 13.5–17.5)
LYMPHOCYTES ABSOLUTE: 0.4 K/UL (ref 1–5.1)
LYMPHOCYTES RELATIVE PERCENT: 5 %
MCH RBC QN AUTO: 29 PG (ref 26–34)
MCHC RBC AUTO-ENTMCNC: 34.2 G/DL (ref 31–36)
MCV RBC AUTO: 84.8 FL (ref 80–100)
MONOCYTES ABSOLUTE: 0.6 K/UL (ref 0–1.3)
MONOCYTES RELATIVE PERCENT: 6.4 %
NEUTROPHILS ABSOLUTE: 7.6 K/UL (ref 1.7–7.7)
NEUTROPHILS RELATIVE PERCENT: 87.3 %
PDW BLD-RTO: 14.9 % (ref 12.4–15.4)
PLATELET # BLD: 205 K/UL (ref 135–450)
PMV BLD AUTO: 8.2 FL (ref 5–10.5)
POTASSIUM REFLEX MAGNESIUM: 3.7 MMOL/L (ref 3.5–5.1)
RBC # BLD: 3.69 M/UL (ref 4.2–5.9)
SODIUM BLD-SCNC: 137 MMOL/L (ref 136–145)
WBC # BLD: 8.7 K/UL (ref 4–11)

## 2022-06-23 PROCEDURE — 2700000000 HC OXYGEN THERAPY PER DAY

## 2022-06-23 PROCEDURE — C9113 INJ PANTOPRAZOLE SODIUM, VIA: HCPCS | Performed by: INTERNAL MEDICINE

## 2022-06-23 PROCEDURE — 6370000000 HC RX 637 (ALT 250 FOR IP): Performed by: INTERNAL MEDICINE

## 2022-06-23 PROCEDURE — 2580000003 HC RX 258: Performed by: INTERNAL MEDICINE

## 2022-06-23 PROCEDURE — 6360000002 HC RX W HCPCS: Performed by: INTERNAL MEDICINE

## 2022-06-23 PROCEDURE — 36415 COLL VENOUS BLD VENIPUNCTURE: CPT

## 2022-06-23 PROCEDURE — 80048 BASIC METABOLIC PNL TOTAL CA: CPT

## 2022-06-23 PROCEDURE — 1200000000 HC SEMI PRIVATE

## 2022-06-23 PROCEDURE — 85025 COMPLETE CBC W/AUTO DIFF WBC: CPT

## 2022-06-23 PROCEDURE — 92526 ORAL FUNCTION THERAPY: CPT

## 2022-06-23 PROCEDURE — 94761 N-INVAS EAR/PLS OXIMETRY MLT: CPT

## 2022-06-23 PROCEDURE — 99223 1ST HOSP IP/OBS HIGH 75: CPT | Performed by: SURGERY

## 2022-06-23 RX ORDER — PANTOPRAZOLE SODIUM 40 MG/1
40 TABLET, DELAYED RELEASE ORAL 2 TIMES DAILY
Qty: 28 TABLET | Refills: 1 | Status: SHIPPED | OUTPATIENT
Start: 2022-06-23 | End: 2022-06-29 | Stop reason: SDUPTHER

## 2022-06-23 RX ORDER — MECOBALAMIN 5000 MCG
5 TABLET,DISINTEGRATING ORAL NIGHTLY
Qty: 14 TABLET | Refills: 0 | Status: SHIPPED | OUTPATIENT
Start: 2022-06-23 | End: 2022-07-07

## 2022-06-23 RX ORDER — AMOXICILLIN AND CLAVULANATE POTASSIUM 875; 125 MG/1; MG/1
1 TABLET, FILM COATED ORAL 2 TIMES DAILY
Status: COMPLETED | OUTPATIENT
Start: 2022-06-23 | End: 2022-06-27

## 2022-06-23 RX ADMIN — SODIUM CHLORIDE 25 ML: 9 INJECTION, SOLUTION INTRAVENOUS at 00:00

## 2022-06-23 RX ADMIN — POTASSIUM CHLORIDE 10 MEQ: 7.46 INJECTION, SOLUTION INTRAVENOUS at 02:29

## 2022-06-23 RX ADMIN — SODIUM CHLORIDE 25 ML: 9 INJECTION, SOLUTION INTRAVENOUS at 05:28

## 2022-06-23 RX ADMIN — PANTOPRAZOLE SODIUM 40 MG: 40 INJECTION, POWDER, LYOPHILIZED, FOR SOLUTION INTRAVENOUS at 09:55

## 2022-06-23 RX ADMIN — SODIUM CHLORIDE, PRESERVATIVE FREE 10 ML: 5 INJECTION INTRAVENOUS at 20:43

## 2022-06-23 RX ADMIN — ENOXAPARIN SODIUM 40 MG: 100 INJECTION SUBCUTANEOUS at 09:55

## 2022-06-23 RX ADMIN — AMOXICILLIN AND CLAVULANATE POTASSIUM 1 TABLET: 875; 125 TABLET, FILM COATED ORAL at 20:43

## 2022-06-23 RX ADMIN — SODIUM CHLORIDE, PRESERVATIVE FREE 10 ML: 5 INJECTION INTRAVENOUS at 09:55

## 2022-06-23 RX ADMIN — Medication 5 MG: at 20:43

## 2022-06-23 RX ADMIN — AMOXICILLIN AND CLAVULANATE POTASSIUM 1 TABLET: 875; 125 TABLET, FILM COATED ORAL at 13:24

## 2022-06-23 RX ADMIN — PANTOPRAZOLE SODIUM 40 MG: 40 INJECTION, POWDER, LYOPHILIZED, FOR SOLUTION INTRAVENOUS at 20:43

## 2022-06-23 RX ADMIN — POTASSIUM CHLORIDE 10 MEQ: 7.46 INJECTION, SOLUTION INTRAVENOUS at 00:01

## 2022-06-23 RX ADMIN — POTASSIUM CHLORIDE 10 MEQ: 7.46 INJECTION, SOLUTION INTRAVENOUS at 01:17

## 2022-06-23 RX ADMIN — SODIUM CHLORIDE 3000 MG: 900 INJECTION INTRAVENOUS at 05:30

## 2022-06-23 ASSESSMENT — PAIN SCALES - WONG BAKER
WONGBAKER_NUMERICALRESPONSE: 0

## 2022-06-23 ASSESSMENT — PAIN SCALES - GENERAL
PAINLEVEL_OUTOF10: 0

## 2022-06-23 ASSESSMENT — PAIN DESCRIPTION - LOCATION: LOCATION: GENERALIZED

## 2022-06-23 NOTE — PROGRESS NOTES
Interval History and plan:      Blood pressure is stable   Urine output is 450 ml +  Stable creatinine 1.7  GI note appreciated     Plan:    Discontinue IV fluid encourage oral intake. He is in stage IIIb chronic kidney disease since 2019. Creatinine is currently at baseline. He has 800 mg / day of proteinuria   Free light chain ratio is 2.3                   Assessment :     Stage IIIb chronic kidney disease: He presented with a creatinine of 1.7. He has baseline creatinine fluctuates between 2.5-1.5. He has kidney disease since 2019. Anemia: Hemoglobin is 12 and there is no need for Procrit. Aspiration pneumonia/COVID-19: CT chest shows right lower lobe crowding and airspace disease. [facility] would like to thank Tonya Moreno MD   for opportunity to serve this patient      Please call with questions at-   24 Hrs Answering service [phone] or  7 am- 5 pm via Perfect serve or cell phone  Percy Baires MD          CC/reason for consult :     Renal insufficiency     HPI :     Josh Coronado is a 80 y.o. male presented to   the hospital on 6/16/2022 with aspiration pneumonia    He has past medical history significant for chronic atrial fibrillation on Eliquis, aortic valve stenosis, chronic kidney disease stage III, history of COVID-19 pneumonia, rhabdomyolysis, severe protein calorie malnourishment. He is now admitted with aspiration pneumonia and is on antibiotics. He is also on isolation for COVID-19. Creatinine on arrival was 2.4. We are called for further management of renal insufficiency.     ROS:     Seen with-family in the room    positives in bold   Labs were obtained                All other remaining systems are negative or unable to obtain        PMH/PSH/SH/Family History:     Past Medical History:   Diagnosis Date    COVID-19 06/10/2022    [facility]       Past Surgical History:   Procedure Laterality Date    UPPER GASTROINTESTINAL ENDOSCOPY N/A 6/20/2022    EGD BIOPSY performed by Pearl Patiño MD at [place] [address]        reports that he has never smoked. He has never used smokeless tobacco. He reports previous alcohol use. He reports that he does not use drugs. family history is not on file. Medication:     Current Facility-Administered Medications: potassium chloride (KLOR-CON M) extended release tablet 40 mEq, 40 mEq, Oral, PRN **OR** potassium bicarb-citric acid (EFFER-K) effervescent tablet 40 mEq, 40 mEq, Oral, PRN **OR** potassium chloride 10 mEq/100 mL IVPB (Peripheral Line), 10 mEq, IntraVENous, PRN  magnesium sulfate 1000 mg in dextrose 5% 100 mL IVPB, 1,000 mg, IntraVENous, PRN  pantoprazole (PROTONIX) injection 40 mg, 40 mg, IntraVENous, BID  melatonin disintegrating tablet 5 mg, 5 mg, Oral, Nightly  ampicillin-sulbactam (UNASYN) 3000 mg in 100 mL NS IVPB minibag, 3,000 mg, IntraVENous, Q6H  sodium chloride flush 0.9 % injection 10 mL, 10 mL, IntraVENous, 2 times per day  sodium chloride flush 0.9 % injection 10 mL, 10 mL, IntraVENous, PRN  0.9 % sodium chloride infusion, , IntraVENous, PRN  enoxaparin (LOVENOX) injection 40 mg, 40 mg, SubCUTAneous, Daily  ondansetron (ZOFRAN) injection 4 mg, 4 mg, IntraVENous, Q4H PRN  polyethylene glycol (GLYCOLAX) packet 17 g, 17 g, Oral, Daily PRN  acetaminophen (TYLENOL) tablet 650 mg, 650 mg, Oral, Q4H PRN **OR** acetaminophen (TYLENOL) suppository 650 mg, 650 mg, Rectal, Q4H PRN       Vitals :     Vitals:    06/23/22 0337   BP: 136/86   Pulse: 93   Resp: 14   Temp: 97.9 °F (36.6 °C)   SpO2: 99%       I & O :       Intake/Output Summary (Last 24 hours) at 6/23/2022 0857  Last data filed at 6/23/2022 0030  Gross per 24 hour   Intake --   Output 450 ml   Net -450 ml        Physical Examination :     Minimal exam was performed to minimize exposure to COVID-19. Patient was seen and evaluated from the door.   Preserving PPE       LABS:     Recent Labs     06/21/22  0912 06/22/22  2136 06/23/22  0810   WBC 8.4 8.4 8.7   HGB 11.8* 11.2* 10.7*   HCT 36.5* 33.3* 31.3*    217 205     Recent Labs     06/20/22  0926 06/20/22  0926 06/21/22  0912 06/22/22  0834 06/22/22  1947     --  141 139  --    K 3.4*   < > 4.0  4.0 3.0*  --      --  110 107  --    CO2 21  --  22 22  --    BUN 21*  --  20 17  --    CREATININE 1.6*  --  1.7* 1.7*  --    GLUCOSE 83  --  106* 99  --    MG 1.70*  --   --  1.60* 2.30   PHOS  --   --  2.9  --   --     < > = values in this interval not displayed.       Nephrology  [address] [facility], [address] [facility]  Office: [phone]  Fax: [phone]

## 2022-06-23 NOTE — PROGRESS NOTES
Speech Language Pathology  Facility/Department: [address]  Dysphagia Daily Treatment & Discharge Note    NAME: Milli Almeida  : 1935  MRN: [id number]    Patient Diagnosis(es):   Patient Active Problem List    Diagnosis Date Noted    Aspiration into airway     Acute aspiration pneumonia ([address] 2022    Acute respiratory failure with hypoxia ([address] 2022    COVID-19 viremia - Tested positive 2022    Generalized weakness 2022     Allergies: No Known Allergies  Onset Date: 22  Current Diet Level:  Minced and Moist, Moderately/Honey Thick Liquids      Recent CXR: 2022  Impression   1. Cardiomegaly and aortic tortuosity   2. Minimal hazy density right lung base, differential radiolucency of the lungs is secondary to patient rotation   3. Hypertrophic osteophytes of the shoulders, left greater than right         CT of Chest: 2022  Impression   1. Right lower lobe bronchovascular crowding and airspace disease, small pneumonia with adjacent pleural thickening   2. Large hiatal hernia   3. Cardiomegaly with coronary artery calcification   4. Vertebral body fracture of L1 is age indeterminate. Chart reviewed. Medical Diagnosis: Aspiration into airway, initial encounter [T17.908A]  Acute aspiration pneumonia ([address] [J69.0]   Treatment Diagnosis: Dysphagia    BSE Impression (22)-  Dysphagia Impression : Needs further assessment. Oral phase dysphagia, with suspected pharyngeal dysphagia. Oral motor exam grossly intact. With pt seated up in bed, on 2 L O2 via nc, upper dentures in place, assessed tolerance ice chips, thins via tsp/straw/cup edge, puree, soft solid. Pt with positive oral acceptance, slowed oral prep, positive swallow movement, good oral clearance. Inconsistent dry cough. Recommend completion of MBS to further assess pharyngeal function and better determine diet recommendations.   Dysphagia Diagnosis: Suspected needs further assessment    MBS results (6/17/22)-  Impressions:  Oral phase: Mild oral dysphagia characterized by slowed mastication, slowed A-P transit, and reduced posterior oral containment with premature loss of bolus.      Pharyngeal phase: Moderate pharyngeal dysphagia characterized by impaired sensation, timing, and hyolaryngeal mechanics resulting in premature spillage of liquids, delayed and reduced anterior laryngeal movement and epiglottic retraction, with compromised airway protection. Resulted in deep penetration and subsequent tracheal aspiration (intermittently silent) of thin and nectar thick liquids. Aspiration occurred both during and after swallow; chin tuck did not prevent, and cough did not clear. No aspiration visualized for honey thick liquids, puree, or soft solid. Reduced tongue base retraction and pharyngeal constriction resulted in mild diffuse vallecular/pyriform stasis; cleared with subsequent swallows.     Upper Esophageal Screen: Reduced cricopharyngeal opening in setting of reduced anterior hyolaryngeal movement. Pain: None expressed    Current Diet : ADULT DIET; Dysphagia - Minced and Moist; Moderately Thick (Honey)   Recommended Form of Meds: Crushed in puree as able  Compensatory Swallowing Strategies : Upright as possible for all oral intake,Eat/Feed slowly,Alternate solids and liquids,Swallow 2 times per bite/sip,Effortful swallow,Set up assist,External pacing,Small bites/sips     Treatment:  Pt seen bedside to address the following goals:  Short-term Goals  Timeframe for Short-term Goals: 1-2 wks or LOS  Goal 1: Patient will participate in further assessment of swallow function as appropriate. 6/21:  Goal met. MBS completed 6/17 with results listed above. D/C goal.     Goal 2: Patient/caregiver will demonstrate understanding of swallowing concerns/recommendations. 6/21: SLP discussed current diet level following recent MBS.  Pt with limited comprehension of strategies/diet level despite mod verbal/visual cues. No family present to discuss. Cont.   6/22: Provided ongoing education regarding swallow fx  / dysphagia including aspiration. Suspect pt with limited comprehension. Continue goal.  6/23: Patient alone, no family present at time of this therapy session. Provided education to patient re: purpose of visit, recent MBS, aspiration, aspiration pneumonia, safe swallow strategies, modified diet and thickened liquids. Pt with verbalized understanding. Provided written handout. Goal met, d/c goal    Goal 3: Pt will tolerate recommended diet with no s/s penetration/aspiration and adequate use of swallow strategies. 6/21: Pt agreeable to trial moderately thick liquids(MTL) at bedside. Pt seated upright with reduced siphon from spoon. SLP instructed pt to create tight labial seal around spoon. Pt followed directions for increased accuracy, however continued mod cues required for labial seal. Pt completed x15 effortful swallows with MTL with no overt s/s penetration/aspiration. Audible swallow noted. Cont.   6/22: Pt provided moderately thick liquids via cup, minced hamburger and puree. He demonstrates reduced labial seal for stripping of the spoon, slowed mastication but full oral clearance of single bite minced meat. He defers further solid trials. With moderately thick liquid, pt drank nearly entire 4 oz consecutively despite mod/max cues from SLP. No overt s/s of aspiration or penetration. Education provided regarding importance of strategies to reduce aspiration and subsequent PNA. Pt declined participation in pharyngeal exercises this date. 6/23: patient awake, resting in bed, on 2L O2 via nc. Attempted to reposition upright and assess tolerance current diet (honey thick and moderately thick); pt initially agreeable, however upon repositioning was too uncomfortable, requesting bed repositioned back down. Per RN pt has been tolerating current diet well. Per chart, pt planning to d/c today. Recommend continue diet. Goal previously met, d/c goal    Patient/Family/Caregiver Education:  Educated re: rationale for diet level, strategies to use. Compensatory Strategies:  Compensatory Swallowing Strategies : Upright as possible for all oral intake,Eat/Feed slowly,Alternate solids and liquids,Swallow 2 times per bite/sip,Effortful swallow,Set up assist,External pacing,Small bites/sips      Plan:  Continue dysphagia treatment with goals per plan of care. Diet recommendations:    - Dysphagia Minced & Moist Solids, Moderately Thick (Honey) Liquids (no straws), meds in puree  - Encourage PO intake  - upright position, small bites/sips, slow rate  - supervision/assistance as needed with meals  - oral hygiene x2 daily     DC recommendation: Recommend speech therapy & repeat MBS at next level of care  Treatment: 10  D/W nursing: Diane  Needs met prior to leaving room, call button in reach. Heladio [place], [place], [address] [place] [id number]  Pg.  # [phone]

## 2022-06-23 NOTE — CONSULTS
Department of General Surgery - Adult  Surgical Service   Consult Note      Reason for Consult:  PEG tube  Requesting Physician:  Dr. Daniele Anderson:  SOB    History Obtained From:  patient, electronic medical record    HISTORY OF PRESENT ILLNESS:                The patient is a 80 y.o. male who presented to the hospital with aspiration pneumonia and Covid. He was recently hospitalized and was found to have dysphagia and was recommended to be on a dysphagia diet (minced and moist) with thickened fluids. Unfortunately this was not followed and he aspirated requiring re-hospitalization. On 6/17 he had a MBS which showed multilevel aspiration. CT shows large hiatal hernia. He did have an EGD done by GI that confirmed the hiatal hernia and also showed an ulcer and circumferential Brito's. GI unable to place g tube. Mr. Glen Pichardo is currently on a Minced and Moist diet with moderately thickened fluids (honey consistency). Palliative Care is on board. He denies any complaints at this time other than weakness. He is not sure if he de leon ever had any type of surgery. No family present at this time.       Past Medical History:        Diagnosis Date    COVID-19 06/10/2022    [facility]     Past Surgical History:        Procedure Laterality Date    UPPER GASTROINTESTINAL ENDOSCOPY N/A 6/20/2022    EGD BIOPSY performed by Harry Hurley MD at [facility] ENDOSCOPY     Current Medications:   Current Facility-Administered Medications: amoxicillin-clavulanate (AUGMENTIN) 875-125 MG per tablet 1 tablet, 1 tablet, Oral, BID  potassium chloride (KLOR-CON M) extended release tablet 40 mEq, 40 mEq, Oral, PRN **OR** potassium bicarb-citric acid (EFFER-K) effervescent tablet 40 mEq, 40 mEq, Oral, PRN **OR** potassium chloride 10 mEq/100 mL IVPB (Peripheral Line), 10 mEq, IntraVENous, PRN  magnesium sulfate 1000 mg in dextrose 5% 100 mL IVPB, 1,000 mg, IntraVENous, PRN  pantoprazole (PROTONIX) injection 40 mg, 40 mg, IntraVENous, BID  melatonin disintegrating tablet 5 mg, 5 mg, Oral, Nightly  sodium chloride flush 0.9 % injection 10 mL, 10 mL, IntraVENous, 2 times per day  sodium chloride flush 0.9 % injection 10 mL, 10 mL, IntraVENous, PRN  0.9 % sodium chloride infusion, , IntraVENous, PRN  enoxaparin (LOVENOX) injection 40 mg, 40 mg, SubCUTAneous, Daily  ondansetron (ZOFRAN) injection 4 mg, 4 mg, IntraVENous, Q4H PRN  polyethylene glycol (GLYCOLAX) packet 17 g, 17 g, Oral, Daily PRN  acetaminophen (TYLENOL) tablet 650 mg, 650 mg, Oral, Q4H PRN **OR** acetaminophen (TYLENOL) suppository 650 mg, 650 mg, Rectal, Q4H PRN  Allergies:  Patient has no known allergies. Social History:   TOBACCO:  Never used tobacco  ETOH:  Not currently  DRUGS:  no  Patient currently lives in a nursing home  Family History:   No family history on file.     REVIEW OF SYSTEMS:    Review of systems not obtained due to patient factors - mental status    PHYSICAL EXAM:    VITALS:  /66   Pulse 94   Temp 98.2 °F (36.8 °C) (Axillary)   Resp 16   Ht 5' 10\" (1.778 m)   Wt 156 lb (70.8 kg)   SpO2 99%   BMI 22.38 kg/m²   24HR INTAKE/OUTPUT:    Intake/Output Summary (Last 24 hours) at 6/23/2022 1202  Last data filed at 6/23/2022 0951  Gross per 24 hour   Intake --   Output 550 ml   Net -550 ml     CONSTITUTIONAL:  Pleasant, alert, thin  LUNGS:  Diminished to bases; Scattered rhonci, oxygen 2.5 l/nc  CARDIOVASCULAR:  Well perfused, S1, S2  ABDOMEN:  No scars, normal bowel sounds, soft, non-distended, non-tender, no masses palpated, no hepatosplenomegally  SKIN:  ecchymosis upper extremities    DATA:    CBC:   Lab Results   Component Value Date    WBC 8.7 06/23/2022    RBC 3.69 06/23/2022    HGB 10.7 06/23/2022    HCT 31.3 06/23/2022    MCV 84.8 06/23/2022    MCH 29.0 06/23/2022    MCHC 34.2 06/23/2022    RDW 14.9 06/23/2022     06/23/2022    MPV 8.2 06/23/2022     CMP:    Lab Results   Component Value Date     06/23/2022    K 3.7 06/23/2022     06/23/2022    CO2 20 06/23/2022    BUN 17 06/23/2022    CREATININE 1.6 06/23/2022    GFRAA 50 06/23/2022    LABGLOM 41 06/23/2022    GLUCOSE 100 06/23/2022    PROT 4.8 06/20/2022    LABALBU 2.6 06/21/2022    CALCIUM 7.5 06/23/2022     BMP:    Lab Results   Component Value Date     06/23/2022    K 3.7 06/23/2022     06/23/2022    CO2 20 06/23/2022    BUN 17 06/23/2022    LABALBU 2.6 06/21/2022    CREATININE 1.6 06/23/2022    CALCIUM 7.5 06/23/2022    GFRAA 50 06/23/2022    LABGLOM 41 06/23/2022    GLUCOSE 100 06/23/2022     Hepatic Function Panel:    Lab Results   Component Value Date    PROT 4.8 06/20/2022    LABALBU 2.6 06/21/2022     PT/INR:  No results found for: PROTIME, INR  U/A:    Lab Results   Component Value Date    COLORU Yellow 06/20/2022    PROTEINU 30 06/20/2022    PHUR 6.0 06/20/2022    WBCUA 0-2 06/20/2022    RBCUA  06/20/2022    CLARITYU Clear 06/20/2022    SPECGRAV 1.025 06/20/2022    LEUKOCYTESUR Negative 06/20/2022    UROBILINOGEN 0.2 06/20/2022    BILIRUBINUR Negative 06/20/2022    BLOODU LARGE 06/20/2022    GLUCOSEU Negative 06/20/2022     EGD results:  Copious amount of tenacious mucus in posterior oropharynx which was suctioned out at beginning of the procedure  Duodenum: Normal  Stomach: normal mucosa. Retroflexion did show a large hiatal hernia from 30 to 45cms. I used transillumination several times and position changes of the patient and no light could be seen in the abdominal wall  Esophagus: upper end of gastric folds at 30cms with an ulcer and biopsies obtained. Circumferential Brito's to 25cms and maximum extent to 22cms ([id number])      IMPRESSION/RECOMMENDATIONS:      80year old male admitted with aspiration pneumonia and Covid 19. He has history of atrial fib on metoprolol. Was on eliquis but recently admitted for a fall with rhabdomyolysis. He also has history of CKD being followed by Nephrology.      Discussed with Palliative Care regarding goals of care  Currently tolerating diet  It would be a difficult surgery with the hiatal hernia and ulcer. It is also not ideal for general anesthesia/surgery this soon after Covid for an elective procedure. D/W team and Attending    I have seen, examined, and reviewed the patients chart. I agree with the residents assessment and have made appropriate changes. Patient with history of pneumonia and hospitalization for COVID. Patient with a large paraesophageal hernia with 2/3 of the stomach in his chest. Open G-tube would be challenging and may require reduction of the stomach from the chest to even accomplish a G-tube. I would not recommend an open G-tube at this time, given his recent COVID infection, and if his PO intake is not adequate would consider nasoenteric feedings.      Albina Asa

## 2022-06-23 NOTE — CARE COORDINATION
[facility] (1st choice) - Corrine Lowery in admissions [phone] is waiting to hear back about possible contract for out of network insurance      [facility] - does not accept non-vaccinated pt's  [facility] - left [place] [phone] (2nd choice) - can accept 6/29, 20 days post covid     [facility] - reviewing           Electronically signed by Gladis Harris MSW, LSW on 6/23/2022 at 11:04 AM

## 2022-06-23 NOTE — SIGNIFICANT EVENT
Called [facility]   [phone]     Twice, no response, left VM, Call was made to update patient family regarding discharge planning and if they are interested in getting PEG tube placed, as GI was not able to place it endoscopically, Also if they do not wish to get surgical intervention, patient can be dsicharged from medical standpoint, patient has multilevel aspiration on MBS, GS was consulted to discuss with family regarding possibility of placing PEG tube if family wishes, per speech patient is tolerating minced and moist diet but need 1 to 1 assistance

## 2022-06-23 NOTE — PROGRESS NOTES
Palliative Care Chart Review  and Check in Note:     NAME:  Carren Siemens  Admit Date: 6/16/2022  Hospital Day:  Hospital Day: 8   Current Code status: Full Code    Palliative care is continuing to following Mr. Russell Fofana for symptom management,  and goals of care discussion as needed. Patient's chart reviewed today 6/23/22. Noted new HCPOA completed, pt's daughter Hai Neri named as primary agent, son Arely Medina as first alternate and daughter Freda Mcfadden as second alternate. Met with pt's daughter Hai Neri at bedside. She reports pt has been eating and drinking more today. They are hopeful he will continue to improve. We discussed that if he does not improve at SNF and topic of PEG tube comes up again, he does have a hiatal hernia that would would placement of PEG tube difficult. Additionally, explained that pt's can still aspirate with a feeding tube. We briefly discussed hospice if he declines despite efforts at SNF. The following are the currently established goals/code status, and Symptom management. Goals of care: Continue with current management, family hopeful that pt will continue to improve.      Code status: Full Code    Discharge plan: Planning for SNF when medically ready for discharge      Lurena Schilder, APRN - CNP  06/23/22  2:36 PM

## 2022-06-23 NOTE — PLAN OF CARE
Problem: ABCDS Injury Assessment  Goal: Absence of physical injury  Outcome: Progressing     Problem: Skin/Tissue Integrity  Goal: Absence of new skin breakdown  Description: 1. Monitor for areas of redness and/or skin breakdown  2. Assess vascular access sites hourly  3. Every 4-6 hours minimum:  Change oxygen saturation probe site  4. Every 4-6 hours:  If on nasal continuous positive airway pressure, respiratory therapy assess nares and determine need for appliance change or resting period. Outcome: Progressing: Turned and repositioned as tolerated.

## 2022-06-23 NOTE — PROGRESS NOTES
Patient is able to tolerate oral pills, and modified diet per Speech, so DC IV unasyn, switch to oral augmentin for aspiration PNA, GI was consulted, GI recommended patient is not candidate for PEG tube via EGD as patient, recommended if PEG needed, patient will need Surgery.

## 2022-06-24 LAB
ANION GAP SERPL CALCULATED.3IONS-SCNC: 8 MMOL/L (ref 3–16)
BASOPHILS ABSOLUTE: 0 K/UL (ref 0–0.2)
BASOPHILS RELATIVE PERCENT: 0.4 %
BUN BLDV-MCNC: 16 MG/DL (ref 7–20)
CALCIUM SERPL-MCNC: 7.9 MG/DL (ref 8.3–10.6)
CHLORIDE BLD-SCNC: 105 MMOL/L (ref 99–110)
CO2: 23 MMOL/L (ref 21–32)
CREAT SERPL-MCNC: 1.6 MG/DL (ref 0.8–1.3)
EOSINOPHILS ABSOLUTE: 0.1 K/UL (ref 0–0.6)
EOSINOPHILS RELATIVE PERCENT: 1 %
GFR AFRICAN AMERICAN: 50
GFR NON-AFRICAN AMERICAN: 41
GLUCOSE BLD-MCNC: 93 MG/DL (ref 70–99)
HCT VFR BLD CALC: 33.2 % (ref 40.5–52.5)
HEMOGLOBIN: 11 G/DL (ref 13.5–17.5)
LYMPHOCYTES ABSOLUTE: 0.5 K/UL (ref 1–5.1)
LYMPHOCYTES RELATIVE PERCENT: 5.2 %
MCH RBC QN AUTO: 28.8 PG (ref 26–34)
MCHC RBC AUTO-ENTMCNC: 33.1 G/DL (ref 31–36)
MCV RBC AUTO: 87.1 FL (ref 80–100)
MONOCYTES ABSOLUTE: 0.5 K/UL (ref 0–1.3)
MONOCYTES RELATIVE PERCENT: 5.9 %
NEUTROPHILS ABSOLUTE: 7.7 K/UL (ref 1.7–7.7)
NEUTROPHILS RELATIVE PERCENT: 87.5 %
PDW BLD-RTO: 15.2 % (ref 12.4–15.4)
PLATELET # BLD: 248 K/UL (ref 135–450)
PMV BLD AUTO: 8.1 FL (ref 5–10.5)
POTASSIUM REFLEX MAGNESIUM: 4.1 MMOL/L (ref 3.5–5.1)
RBC # BLD: 3.82 M/UL (ref 4.2–5.9)
SODIUM BLD-SCNC: 136 MMOL/L (ref 136–145)
WBC # BLD: 8.8 K/UL (ref 4–11)

## 2022-06-24 PROCEDURE — 6360000002 HC RX W HCPCS: Performed by: INTERNAL MEDICINE

## 2022-06-24 PROCEDURE — 6370000000 HC RX 637 (ALT 250 FOR IP): Performed by: INTERNAL MEDICINE

## 2022-06-24 PROCEDURE — 97535 SELF CARE MNGMENT TRAINING: CPT

## 2022-06-24 PROCEDURE — 1200000000 HC SEMI PRIVATE

## 2022-06-24 PROCEDURE — C9113 INJ PANTOPRAZOLE SODIUM, VIA: HCPCS | Performed by: INTERNAL MEDICINE

## 2022-06-24 PROCEDURE — 85025 COMPLETE CBC W/AUTO DIFF WBC: CPT

## 2022-06-24 PROCEDURE — 97110 THERAPEUTIC EXERCISES: CPT

## 2022-06-24 PROCEDURE — 36415 COLL VENOUS BLD VENIPUNCTURE: CPT

## 2022-06-24 PROCEDURE — 80048 BASIC METABOLIC PNL TOTAL CA: CPT

## 2022-06-24 PROCEDURE — 2580000003 HC RX 258: Performed by: INTERNAL MEDICINE

## 2022-06-24 PROCEDURE — 99231 SBSQ HOSP IP/OBS SF/LOW 25: CPT | Performed by: SURGERY

## 2022-06-24 PROCEDURE — 97530 THERAPEUTIC ACTIVITIES: CPT

## 2022-06-24 RX ADMIN — PANTOPRAZOLE SODIUM 40 MG: 40 INJECTION, POWDER, LYOPHILIZED, FOR SOLUTION INTRAVENOUS at 21:39

## 2022-06-24 RX ADMIN — AMOXICILLIN AND CLAVULANATE POTASSIUM 1 TABLET: 875; 125 TABLET, FILM COATED ORAL at 08:05

## 2022-06-24 RX ADMIN — SODIUM CHLORIDE, PRESERVATIVE FREE 10 ML: 5 INJECTION INTRAVENOUS at 08:05

## 2022-06-24 RX ADMIN — SODIUM CHLORIDE, PRESERVATIVE FREE 10 ML: 5 INJECTION INTRAVENOUS at 21:42

## 2022-06-24 RX ADMIN — ENOXAPARIN SODIUM 40 MG: 100 INJECTION SUBCUTANEOUS at 08:05

## 2022-06-24 RX ADMIN — AMOXICILLIN AND CLAVULANATE POTASSIUM 1 TABLET: 875; 125 TABLET, FILM COATED ORAL at 21:39

## 2022-06-24 RX ADMIN — Medication 5 MG: at 21:39

## 2022-06-24 RX ADMIN — PANTOPRAZOLE SODIUM 40 MG: 40 INJECTION, POWDER, LYOPHILIZED, FOR SOLUTION INTRAVENOUS at 08:05

## 2022-06-24 ASSESSMENT — PAIN SCALES - WONG BAKER
WONGBAKER_NUMERICALRESPONSE: 0

## 2022-06-24 ASSESSMENT — PAIN SCALES - GENERAL: PAINLEVEL_OUTOF10: 0

## 2022-06-24 NOTE — PROGRESS NOTES
Surgery Daily Progress Note      CC: PEG tube    SUBJECTIVE:  Patient rested well overnight. Tolerating dysphagia diet. Denies gas and having bowel movements. ROS:   A 14 point review of systems was conducted, significant findings as noted above. All other systems negative. OBJECTIVE:    PHYSICAL EXAM:  Vitals:    06/23/22 0945 06/23/22 1850 06/23/22 2030 06/24/22 0300   BP: 107/66 120/82 130/84 122/80   Pulse: 94 93 82 87   Resp: 16 17 16 16   Temp: 98.2 °F (36.8 °C) 97.9 °F (36.6 °C) 98.1 °F (36.7 °C) 97.6 °F (36.4 °C)   TempSrc: Axillary Oral Oral Oral   SpO2: 99% 99% 99% 97%   Weight:       Height:         CONSTITUTIONAL:  Pleasant, alert, thin  LUNGS:  Diminished to bases; Scattered rhonci, oxygen 2.5 l/nc  CARDIOVASCULAR:  Well perfused, S1, S2  ABDOMEN:  No scars, normal bowel sounds, soft, non-distended, non-tender, no masses palpated, no hepatosplenomegally  SKIN:  ecchymosis upper extremities      ASSESSMENT & PLAN:   80year old male admitted with aspiration pneumonia and Covid 19. He has history of atrial fib on metoprolol. Was on eliquis but recently admitted for a fall with rhabdomyolysis. He also has history of CKD being followed by Nephrology. - Tolerating dysphagia diet at this time, confirmed with nurse able to tolerate apple sauce  - No surgery in the setting of recent COVID and debility, would recommend nasoenteric feeding if supplemental feeding is needed  - Calorie counts today  - Family to discuss further wishes  - If family wishes to pursue  surgical enteral access in the future they can follow up with Dr. Tiffani Jean Baptiste DO  06/24/22  6:16 AM  [phone]    I have seen, examined, and reviewed the patients chart. I agree with the residents assessment and have made appropriate changes.     Ashlie Khalil

## 2022-06-24 NOTE — PROGRESS NOTES
Hospitalist Progress Note      PCP: Jazmine Rodriguez MD    Date of Admission: 6/16/2022    Chief Complaint: Aspiration Pneumonia    Hospital Course: H&P    Subjective: Patient seen and evaluated at the bedside, no acute events overnight, patient is alert and awake today, MBS did show multilevel aspiration, he is able to hold conversations    Per staff patient tolerating minced and moist diet however needing one-to-one feeding assistance  Medications:  Reviewed    Infusion Medications    sodium chloride 25 mL (06/23/22 0528)     Scheduled Medications    amoxicillin-clavulanate  1 tablet Oral BID    pantoprazole  40 mg IntraVENous BID    melatonin  5 mg Oral Nightly    sodium chloride flush  10 mL IntraVENous 2 times per day    enoxaparin  40 mg SubCUTAneous Daily     PRN Meds: potassium chloride **OR** potassium alternative oral replacement **OR** potassium chloride, magnesium sulfate, sodium chloride flush, sodium chloride, ondansetron, polyethylene glycol, acetaminophen **OR** acetaminophen      Intake/Output Summary (Last 24 hours) at 6/24/2022 1840  Last data filed at 6/24/2022 1226  Gross per 24 hour   Intake 450 ml   Output 575 ml   Net -125 ml       Physical Exam Performed:    /82   Pulse 88   Temp 97.8 °F (36.6 °C) (Oral)   Resp 16   Ht 5' 10\" (1.778 m)   Wt 156 lb (70.8 kg)   SpO2 98%   BMI 22.38 kg/m²     General appearance: NAD, lying in bed, holding conversations  HEENT: Pupils equal, round, and reactive to light. Conjunctivae/corneas clear. Neck: Supple, with full range of motion. No jugular venous distention. Trachea midline. Respiratory:  Normal respiratory effort. Clear to auscultation, bilaterally without Rales/Wheezes/Rhonchi. Cardiovascular: Regular rate and rhythm with normal S1/S2 without murmurs, rubs or gallops. Abdomen: Soft, non-tender, non-distended with normal bowel sounds. Musculoskeletal: No clubbing, cyanosis or edema bilaterally.   Full range of motion without deformity. Skin: Skin color, texture, turgor normal.  No rashes or lesions. Neurologic:  Neurovascularly intact without any focal sensory/motor deficits. Cranial nerves: II-XII intact, grossly non-focal.  Psychiatric: Alert and awake  Capillary Refill: Brisk,3 seconds, normal   Peripheral Pulses: +2 palpable, equal bilaterally       Labs:   Recent Labs     06/22/22  0834 06/23/22  0810 06/24/22  0924   WBC 8.4 8.7 8.8   HGB 11.2* 10.7* 11.0*   HCT 33.3* 31.3* 33.2*    205 248     Recent Labs     06/22/22  0834 06/23/22  0810 06/24/22  0924    137 136   K 3.0* 3.7 4.1    105 105   CO2 22 20* 23   BUN 17 17 16   CREATININE 1.7* 1.6* 1.6*   CALCIUM 7.6* 7.5* 7.9*     No results for input(s): AST, ALT, BILIDIR, BILITOT, ALKPHOS in the last 72 hours. No results for input(s): INR in the last 72 hours. No results for input(s): Rhae Childes in the last 72 hours. Urinalysis:      Lab Results   Component Value Date    NITRU Negative 06/20/2022    WBCUA 0-2 06/20/2022    RBCUA  06/20/2022    BLOODU LARGE 06/20/2022    SPECGRAV 1.025 06/20/2022    GLUCOSEU Negative 06/20/2022       Radiology:  FL MODIFIED BARIUM SWALLOW W VIDEO   Final Result      1. Multilevel episodes of tracheal aspiration as detailed above. CT CHEST WO CONTRAST   Final Result   1. Right lower lobe bronchovascular crowding and airspace disease, small pneumonia with adjacent pleural thickening   2. Large hiatal hernia   3. Cardiomegaly with coronary artery calcification   4. Vertebral body fracture of L1 is age indeterminate. XR CHEST PORTABLE   Final Result   1. Cardiomegaly and aortic tortuosity   2. Minimal hazy density right lung base, differential radiolucency of the lungs is secondary to patient rotation   3.  Hypertrophic osteophytes of the shoulders, left greater than right              Assessment/Plan:    Active Hospital Problems    Diagnosis     Aspiration into airway [T17.908A] Priority: Medium    Acute aspiration pneumonia ([facility] [address] [J69.0]      Priority: Medium    Acute respiratory failure with hypoxia (HCC) [J96.01]      Priority: Medium    COVID-19 viremia - Tested positive 06/09/2022 [U07.1]      Priority: Medium    Generalized weakness [R53.1]      Priority: Medium     Pneumonia, due to aspiration - Pt has been started on Unasyn  - Pt tested positive for the COVID-19 virus in 06/09/2022, and dos not appear to have COVID pneumonia  - Respiratory culture ordered  - Legionella pneumophilia and Strep pneumoniae urine antigens ordered     COVID-19 viremia - Tested positive 06/09/2022 - Provide symptomatic treatment     Acute respiratory failure with hypoxia (HCC) - due to aspiration pneumonia, not COVID. As evidenced by an oxygen saturation of less than 90% on room air. We will provide oxygen as needed to maintain an oxygen saturation of 92% or higher.     Aspiration   -Speech evaluated. -Recommend dysphagia minced&Moist solids, moderately Thick  MBS shows multilevel aspiration     Generalized weakness   - PT/OT to evaluate and treat patient, and if necessary to provide recommendations for post hospital therapy     Acute metabolic encephalopathy -we will provide supportive care    MARY vs CKD -improving  -Cr 2.45 on 6/11  -Continue IVF    DVT Prophylaxis: Lovenox  Diet: ADULT DIET; Dysphagia - Minced and Moist; Moderately Thick (Honey)  ADULT ORAL NUTRITION SUPPLEMENT; Breakfast, Lunch, Dinner; Frozen Oral Supplement  Code Status: Full Code    PT/OT Eval Status: ordered    Dispo/plan- discontinue IV Unasyn, switch to Augmentin consulted GI, speech for [id number] plan for PEG tube per GI/general surgery, patient will be discharged on oral antibiotics and follow-up with general surgery as outpatient and family is interested in abdominal surgery, I called patient family twice, no response, left voicemail.     Geraldo Muhammad MD

## 2022-06-24 NOTE — PROGRESS NOTES
Interval History and plan:      Blood pressure is stable   Urine output is ? No significant overnight events   Stable creatinine 1.6    Plan:    Encourage oral intake  He is in stage IIIb chronic kidney disease since 2019. Creatinine is currently at baseline. He has 800 mg / day of proteinuria   Free light chain ratio is 2.3  No changes in the plan today                    Assessment :     Stage IIIb chronic kidney disease: He presented with a creatinine of 1.7. He has baseline creatinine fluctuates between 2.5-1.5. He has kidney disease since 2019. Anemia: Hemoglobin is 12 and there is no need for Procrit. Aspiration pneumonia/COVID-19: CT chest shows right lower lobe crowding and airspace disease. [facility] would like to thank Georges Gillespie MD   for opportunity to serve this patient      Please call with questions at-   24 Hrs Answering service [phone] or  7 am- 5 pm via Perfect serve or cell phone  aRmon Lala MD          CC/reason for consult :     Renal insufficiency     HPI :     Susan Rooney is a 80 y.o. male presented to   the hospital on 6/16/2022 with aspiration pneumonia    He has past medical history significant for chronic atrial fibrillation on Eliquis, aortic valve stenosis, chronic kidney disease stage III, history of COVID-19 pneumonia, rhabdomyolysis, severe protein calorie malnourishment. He is now admitted with aspiration pneumonia and is on antibiotics. He is also on isolation for COVID-19. Creatinine on arrival was 2.4. We are called for further management of renal insufficiency.     ROS:     Seen with-family in the room    positives in bold   Labs were obtained                All other remaining systems are negative or unable to obtain        PMH/PSH/SH/Family History:     Past Medical History:   Diagnosis Date    COVID-19 06/10/2022    [facility]       Past Surgical History:   Procedure Laterality Date    UPPER GASTROINTESTINAL ENDOSCOPY N/A 6/20/2022    EGD BIOPSY performed by Luna Ardon MD at [place] [address]        reports that he has never smoked. He has never used smokeless tobacco. He reports previous alcohol use. He reports that he does not use drugs. family history is not on file. Medication:     Current Facility-Administered Medications: amoxicillin-clavulanate (AUGMENTIN) 875-125 MG per tablet 1 tablet, 1 tablet, Oral, BID  potassium chloride (KLOR-CON M) extended release tablet 40 mEq, 40 mEq, Oral, PRN **OR** potassium bicarb-citric acid (EFFER-K) effervescent tablet 40 mEq, 40 mEq, Oral, PRN **OR** potassium chloride 10 mEq/100 mL IVPB (Peripheral Line), 10 mEq, IntraVENous, PRN  magnesium sulfate 1000 mg in dextrose 5% 100 mL IVPB, 1,000 mg, IntraVENous, PRN  pantoprazole (PROTONIX) injection 40 mg, 40 mg, IntraVENous, BID  melatonin disintegrating tablet 5 mg, 5 mg, Oral, Nightly  sodium chloride flush 0.9 % injection 10 mL, 10 mL, IntraVENous, 2 times per day  sodium chloride flush 0.9 % injection 10 mL, 10 mL, IntraVENous, PRN  0.9 % sodium chloride infusion, , IntraVENous, PRN  enoxaparin (LOVENOX) injection 40 mg, 40 mg, SubCUTAneous, Daily  ondansetron (ZOFRAN) injection 4 mg, 4 mg, IntraVENous, Q4H PRN  polyethylene glycol (GLYCOLAX) packet 17 g, 17 g, Oral, Daily PRN  acetaminophen (TYLENOL) tablet 650 mg, 650 mg, Oral, Q4H PRN **OR** acetaminophen (TYLENOL) suppository 650 mg, 650 mg, Rectal, Q4H PRN       Vitals :     Vitals:    06/24/22 0755   BP: 119/82   Pulse: 88   Resp: 16   Temp: 97.8 °F (36.6 °C)   SpO2: 98%       I & O :       Intake/Output Summary (Last 24 hours) at 6/24/2022 0818  Last data filed at 6/24/2022 0757  Gross per 24 hour   Intake 510 ml   Output 675 ml   Net -165 ml        Physical Examination :     Minimal exam was performed to minimize exposure to COVID-19. Patient was seen and evaluated from the door.   Preserving PPE       LABS:     Recent Labs     06/21/22  0953 06/22/22  0834 06/23/22  0810   WBC 8.4 8.4 8.7   HGB 11.8* 11.2* 10.7*   HCT 36.5* 33.3* 31.3*    217 205     Recent Labs     06/21/22  0912 06/22/22  0834 06/22/22  1947 06/23/22  0810    139  --  137   K 4.0  4.0 3.0*  --  3.7    107  --  105   CO2 22 22  --  20*   BUN 20 17  --  17   CREATININE 1.7* 1.7*  --  1.6*   GLUCOSE 106* 99  --  100*   MG  --  1.60* 2.30  --    PHOS 2.9  --   --   --       Nephrology  [address] [place] [address] [facility]  Office: [phone]  Fax: [phone]

## 2022-06-24 NOTE — PROGRESS NOTES
Physical Therapy  Facility/Department: [address] [place] [address]  [facility]    Name: Selwyn Lux  : 1935  MRN: [id number]  Date of Service: 2022    Discharge Recommendations:      PT Equipment Recommendations  Equipment Needed: No      Patient Diagnosis(es): The primary encounter diagnosis was Aspiration into airway, initial encounter. A diagnosis of Dysphagia, unspecified type was also pertinent to this visit. Past Medical History:  has a past medical history of COVID-19. Past Surgical History:  has a past surgical history that includes Upper gastrointestinal endoscopy (N/A, 2022). Assessment   Assessment: During today's session the patient was able to form full sentences and was trying to figure out where he was. Bed mobility to the EOB was performed in conjuction with OT, to help clean up the pt and get his extremtities moving. Pt was signficantly more stiff and reported pain with PROM of the knees and hips. He still requires Max Ax2 for bed mobility and sitting tolerance. PT rec skilled IP therapy to improve strength, indep and mobility. IF pt is to return home, pt needs 24 hr capable assistance. Will follow  Requires PT Follow-Up: Yes  Activity Tolerance  Activity Tolerance: Patient limited by fatigue;Patient limited by pain     Plan   Plan  Plan:  ((2-5))  Current Treatment Recommendations: Strengthening,ROM,Functional mobility training,Transfer training,Patient/Caregiver education & training,Pain management,Gait training,Stair training,Endurance training  Safety Devices  Type of Devices: Left in bed,Call light within reach,Bed alarm in place,Nurse notified     Restrictions  Position Activity Restriction  Other position/activity restrictions: up with assist     Subjective   Subjective  Subjective: Pt found supine in bed. Pt was agreeable to get out of bed but stated he needed help.          Social/Functional History  Social/Functional History  Lives With: Alone,Daughter  Type of Home: House  Home Layout: One level,Able to Live on Main level with bedroom/bathroom  Home Access: Stairs to enter with rails  Entrance Stairs - Number of Steps: 2  Entrance Stairs - Rails: Both  Bathroom Shower/Tub: Walk-in shower  Bathroom Equipment: Grab bars in shower,Shower chair  Bathroom Accessibility: Wheelchair accessible  Home Equipment: Walker, rolling,Cane  Has the patient had two or more falls in the past year or any fall with injury in the past year?: Yes  Receives Help From: Family  ADL Assistance: Independent  Toileting: Independent  Homemaking Assistance: Independent  Ambulation Assistance: Needs assistance  Transfer Assistance: Independent  Active : No  Occupation: Retired  Additional Comments: History was taken by daughter present in the room. Pt was living independently before the fall and is planning to go home with daughter who has a handicap suite in her house. Objective   Bed Mobility Training  Bed Mobility Training: Yes  Supine to Sit: Maximum assistance;Assist X2;Additional time (cues throughout movement)  Sit to Supine: Maximum assistance;Assist X2  Scooting: Assist X2;Maximum assistance (up in bed)  Balance  Sitting: With support (~10 minutes EOB; Min to Mod A with constant cues to correct posture.  Pt tends to lean towards R side)  Bed mobility  Rolling to Left: Maximum assistance;2 Person assistance  Supine to Sit: 2 Person assistance;Maximum assistance  Sit to Supine: Maximum assistance;2 Person assistance  Bed Mobility Comments: Sitting EOB for 10 minutes              PROM Exercises: knee extension and hip flexion in sitting, very gentle, pt painful and stiff        OutComes Score    AM-PAC Score  AM-PAC Inpatient Mobility Raw Score : 6 (06/24/22 1516)  AM-PAC Inpatient T-Scale Score : 23.55 (06/24/22 1516)  Mobility Inpatient CMS 0-100% Score: 100 (06/24/22 1516)  Mobility Inpatient CMS G-Code Modifier : CN (06/24/22 1516)          Goals  Short Term Goals  Time Frame for Short term goals: discharge  Short term goal 1: Rolling Left Mod A, on going  Short term goal 2: Supine to sit Mod A, on going  Short term goal 3: Sitting balance for 8 minutes with no LOB, on going  Patient Goals   Patient goals : Not stated       Education  Patient Education  Education Given To: Patient  Education Provided: Role of Therapy;Plan of Care  Education Method: Verbal  Barriers to Learning: Cognition  Education Outcome: Verbalized understanding      Therapy Time   Individual Concurrent Group Co-treatment   Time In [id number]         Time Out 1347         Minutes 25           Timed Code Treatment Minutes:   25    Total Treatment Minutes:    [address], PT

## 2022-06-24 NOTE — PROGRESS NOTES
Hospitalist Progress Note      PCP: Celestine Uriarte MD    Date of Admission: 6/16/2022    Chief Complaint: Aspiration Pneumonia    Hospital Course: H&P    Subjective: Patient seen and evaluated at the bedside, no acute events overnight, patient appears more alert and awake today, he is sitting more upright today, MBS did show multilevel aspiration    Per staff patient tolerating minced and moist diet however needing one-to-one feeding assistance  Medications:  Reviewed    Infusion Medications    sodium chloride 25 mL (06/23/22 0528)     Scheduled Medications    amoxicillin-clavulanate  1 tablet Oral BID    pantoprazole  40 mg IntraVENous BID    melatonin  5 mg Oral Nightly    sodium chloride flush  10 mL IntraVENous 2 times per day    enoxaparin  40 mg SubCUTAneous Daily     PRN Meds: potassium chloride **OR** potassium alternative oral replacement **OR** potassium chloride, magnesium sulfate, sodium chloride flush, sodium chloride, ondansetron, polyethylene glycol, acetaminophen **OR** acetaminophen      Intake/Output Summary (Last 24 hours) at 6/23/2022 2005  Last data filed at 6/23/2022 1925  Gross per 24 hour   Intake 510 ml   Output 450 ml   Net 60 ml       Physical Exam Performed:    /82   Pulse 93   Temp 97.9 °F (36.6 °C) (Oral)   Resp 17   Ht 5' 10\" (1.778 m)   Wt 156 lb (70.8 kg)   SpO2 99%   BMI 22.38 kg/m²     General appearance: NAD, lying in bed  HEENT: Pupils equal, round, and reactive to light. Conjunctivae/corneas clear. Neck: Supple, with full range of motion. No jugular venous distention. Trachea midline. Respiratory:  Normal respiratory effort. Clear to auscultation, bilaterally without Rales/Wheezes/Rhonchi. Cardiovascular: Regular rate and rhythm with normal S1/S2 without murmurs, rubs or gallops. Abdomen: Soft, non-tender, non-distended with normal bowel sounds. Musculoskeletal: No clubbing, cyanosis or edema bilaterally.   Full range of motion without deformity. Skin: Skin color, texture, turgor normal.  No rashes or lesions. Neurologic:  Neurovascularly intact without any focal sensory/motor deficits. Cranial nerves: II-XII intact, grossly non-focal.  Psychiatric: Alert and awake  Capillary Refill: Brisk,3 seconds, normal   Peripheral Pulses: +2 palpable, equal bilaterally       Labs:   Recent Labs     06/21/22  0912 06/22/22  0834 06/23/22  0810   WBC 8.4 8.4 8.7   HGB 11.8* 11.2* 10.7*   HCT 36.5* 33.3* 31.3*    217 205     Recent Labs     06/21/22  0912 06/22/22  0834 06/23/22  0810    139 137   K 4.0  4.0 3.0* 3.7    107 105   CO2 22 22 20*   BUN 20 17 17   CREATININE 1.7* 1.7* 1.6*   CALCIUM 7.9* 7.6* 7.5*   PHOS 2.9  --   --      No results for input(s): AST, ALT, BILIDIR, BILITOT, ALKPHOS in the last 72 hours. No results for input(s): INR in the last 72 hours. No results for input(s): Assunta Leyva in the last 72 hours. Urinalysis:      Lab Results   Component Value Date    NITRU Negative 06/20/2022    WBCUA 0-2 06/20/2022    RBCUA  06/20/2022    BLOODU LARGE 06/20/2022    SPECGRAV 1.025 06/20/2022    GLUCOSEU Negative 06/20/2022       Radiology:  FL MODIFIED BARIUM SWALLOW W VIDEO   Final Result      1. Multilevel episodes of tracheal aspiration as detailed above. CT CHEST WO CONTRAST   Final Result   1. Right lower lobe bronchovascular crowding and airspace disease, small pneumonia with adjacent pleural thickening   2. Large hiatal hernia   3. Cardiomegaly with coronary artery calcification   4. Vertebral body fracture of L1 is age indeterminate. XR CHEST PORTABLE   Final Result   1. Cardiomegaly and aortic tortuosity   2. Minimal hazy density right lung base, differential radiolucency of the lungs is secondary to patient rotation   3.  Hypertrophic osteophytes of the shoulders, left greater than right              Assessment/Plan:    Active Hospital Problems    Diagnosis     Aspiration into airway [G65.382V]      Priority: Medium    Acute aspiration pneumonia ([facility] [address] [J69.0]      Priority: Medium    Acute respiratory failure with hypoxia (HCC) [J96.01]      Priority: Medium    COVID-19 viremia - Tested positive 06/09/2022 [U07.1]      Priority: Medium    Generalized weakness [R53.1]      Priority: Medium     Pneumonia, due to aspiration - Pt has been started on Unasyn  - Pt tested positive for the COVID-19 virus in 06/09/2022, and dos not appear to have COVID pneumonia  - Respiratory culture ordered  - Legionella pneumophilia and Strep pneumoniae urine antigens ordered     COVID-19 viremia - Tested positive 06/09/2022 - Provide symptomatic treatment     Acute respiratory failure with hypoxia (HCC) - due to aspiration pneumonia, not COVID. As evidenced by an oxygen saturation of less than 90% on room air. We will provide oxygen as needed to maintain an oxygen saturation of 92% or higher.     Aspiration   -Speech evaluated. -Recommend dysphagia minced&Moist solids, moderately Thick  MBS shows multilevel aspiration     Generalized weakness   - PT/OT to evaluate and treat patient, and if necessary to provide recommendations for post hospital therapy     Acute metabolic encephalopathy -we will provide supportive care    MARY vs CKD -improving  -Cr 2.45 on 6/11  -Continue IVF    DVT Prophylaxis: Lovenox  Diet: ADULT DIET;  Dysphagia - Minced and Moist; Moderately Thick (Honey)  Code Status: Full Code    PT/OT Eval Status: ordered    Dispo/plan-discontinue IV Unasyn, switch to Augmentin consulted GI, speech for [id number] plan for PEG tube per GI/general surgery, patient will be discharged tomorrow on oral antibiotics  Rianna Broussard MD

## 2022-06-24 NOTE — CARE COORDINATION
Social Work: Discussed in Interdisciplinary Rounds:    SW following for discharge disposition. Per chart review, pt admitted from [address] [place] [address] but family does not want pt to return due to poor care. New referrals have been sent out for potential placement. SW to follow up on referrals. SW received call from admissions at [facility], they can accept but won't have bed available until 6/29/22. Note Palliative is following. [facility] is [phone] and . SW to follow. 1311: Discharge order noted. SW spoke with admissions for [facility] and [facility], they have declined to accept pt. [facility] has no beds. [facility] no response. SW spoke with Frankie at [address] [place] [address], they will submit precert, but she advised she won't have a bed available until Monday, 6/27/22. SW to follow. 1341: SW received call from [facility] admissions at [place], they can accept pt and will submit precert today. SW called [address] [place] [address] and had them to not start precert, they voiced understanding.     Jaky Dacosta, EDUARD  [phone]

## 2022-06-24 NOTE — PROGRESS NOTES
Occupational Therapy  Facility/Department: [address]  Daily Treatment Note  NAME: Parish Rosas  : 1935  MRN: [id number]    Date of Service: 2022    Discharge Recommendations: Parish Rosas scored a 10/24 on the AM-PAC ADL Inpatient form. Current research shows that an AM-PAC score of 17 or less is typically not associated with a discharge to the patient's home setting. Based on the patient's AM-PAC score and their current ADL deficits, it is recommended that the patient have 3-5 sessions per week of Occupational Therapy at d/c to increase the patient's independence. Please see assessment section for further patient specific details. If patient discharges prior to next session this note will serve as a discharge summary. Please see below for the latest assessment towards goals. Patient Diagnosis(es): The primary encounter diagnosis was Aspiration into airway, initial encounter. A diagnosis of Dysphagia, unspecified type was also pertinent to this visit. Assessment         Assessment: Pt tolerated session well with constant encouragement throughout, and more alert compared to previous session. Pt with stiffness and low endurance. Still requiring assist of 2 for bed mobility. Improving in sitting balance and tolerance this session. Pt would benefit from cont OT for ADLs, funct mob and transfers.  Cont with POC      Activity Tolerance: Patient tolerated treatment well;Patient limited by endurance      Plan   Plan  Times per Week: 2-5  Times per Day: Daily     Restrictions       Subjective   Subjective  Subjective: Pt in bed upon arrival. Agreeable to OT/PT treat with encouragement  Pain: Complaint of stiffness during movt           Objective    Vitals     Bed Mobility Training  Bed Mobility Training: Yes  Supine to Sit: Maximum assistance;Assist X2;Additional time (cues throughout movement)  Sit to Supine: Maximum assistance;Assist X2  Scooting: Assist X2;Maximum assistance (up in bed)      Balance  Sitting: With support (~10 minutes EOB; Min to Mod A with constant cues to correct posture. Pt tends to lean towards R side)           ADL  Feeding: Dependent/Total (1:1 assist needed for feeding)  UE Bathing: Maximum assistance (sponge bathe EOB)  LE Bathing: Maximum assistance           Safety Devices  Type of Devices: Left in bed;Call light within reach; Bed alarm in place;Nurse notified     Patient Education  Education Given To: Patient  Education Provided: Role of Therapy;Plan of Care;ADL Adaptive Strategies;Transfer Training  Education Method: Demonstration;Verbal  Barriers to Learning: Cognition  Education Outcome: Continued education needed    Goals  Short Term Goals  Time Frame for Short term goals: dc  Short Term Goal 1: supine to sit Paola- not met  Short Term Goal 2: sit balance CGA EOB for 5 min- met for time 6/24  Short Term Goal 3: grooming ADL EOB with CGA- not met  Short Term Goal 4: Tolerate fx transfer assessment- not met       Therapy Time   Individual Concurrent Group Co-treatment   Time In 1322         Time Out 1347         Minutes 25         Timed Code Treatment Minutes: 25 Minutes       RENNY Guerrero/MONICA

## 2022-06-24 NOTE — CONSULTS
Nutrition Note     RD did not conduct direct, in-person nutrition evaluation in efforts to reduce exposure and use of PPE for high risk persons, PUI persons, patients who have tested positive for Covid-19 or those awaiting respiratory panel results. EMR was screened for nutrition risk factors, as defined per nutrition standards of care. NUTRITION RECOMMENDATIONS:   1. PO Diet: Continue minced and moist diet with moderately thick liquids   2. ONS: Start Magic Cup TID    NUTRITION ASSESSMENT:  Nutritional summary & status: RD consulted for general nutrition management for malnutrition. Unable to assess pt in person at this time d/t pt being covid+. No wt hx to assess wt status. Will send ONS to improve po intakes during adm.  Admission/PMH:  aspiration PNA, covid+ / no pertinent    MALNUTRITION ASSESSMENT  Context of Malnutrition: Acute Illness   Malnutrition Status: Insufficient data    NUTRITION DIAGNOSIS   Inadequate oral intake related to impaired respiratory function as evidenced by poor intake prior to admission    [address] and/or Nutrient Delivery:  Continue Current Diet,Start Oral Nutrition Supplement  Nutrition Education/Counseling:  No recommendation at this time       The patient will still be monitored per nutrition standards of care. Consult dietitian if nutrition interventions essential to patient care is needed.      Peggy Couch, [address] [place] [address], [address]:  [phone]  Office:  [phone]

## 2022-06-25 LAB
ALBUMIN SERPL-MCNC: 2 G/DL (ref 3.4–5)
ANION GAP SERPL CALCULATED.3IONS-SCNC: 14 MMOL/L (ref 3–16)
ANION GAP SERPL CALCULATED.3IONS-SCNC: 15 MMOL/L (ref 3–16)
BASOPHILS ABSOLUTE: 0.1 K/UL (ref 0–0.2)
BASOPHILS RELATIVE PERCENT: 1.5 %
BUN BLDV-MCNC: 18 MG/DL (ref 7–20)
BUN BLDV-MCNC: 18 MG/DL (ref 7–20)
CALCIUM SERPL-MCNC: 7.7 MG/DL (ref 8.3–10.6)
CALCIUM SERPL-MCNC: 7.7 MG/DL (ref 8.3–10.6)
CHLORIDE BLD-SCNC: 105 MMOL/L (ref 99–110)
CHLORIDE BLD-SCNC: 106 MMOL/L (ref 99–110)
CO2: 15 MMOL/L (ref 21–32)
CO2: 15 MMOL/L (ref 21–32)
CREAT SERPL-MCNC: 1.6 MG/DL (ref 0.8–1.3)
CREAT SERPL-MCNC: 1.6 MG/DL (ref 0.8–1.3)
EOSINOPHILS ABSOLUTE: 0.1 K/UL (ref 0–0.6)
EOSINOPHILS RELATIVE PERCENT: 1.1 %
GFR AFRICAN AMERICAN: 50
GFR AFRICAN AMERICAN: 50
GFR NON-AFRICAN AMERICAN: 41
GFR NON-AFRICAN AMERICAN: 41
GLUCOSE BLD-MCNC: 93 MG/DL (ref 70–99)
GLUCOSE BLD-MCNC: 93 MG/DL (ref 70–99)
HCT VFR BLD CALC: 35.6 % (ref 40.5–52.5)
HEMOGLOBIN: 11.7 G/DL (ref 13.5–17.5)
LYMPHOCYTES ABSOLUTE: 0.5 K/UL (ref 1–5.1)
LYMPHOCYTES RELATIVE PERCENT: 5.9 %
MAGNESIUM: 2 MG/DL (ref 1.8–2.4)
MCH RBC QN AUTO: 29.9 PG (ref 26–34)
MCHC RBC AUTO-ENTMCNC: 32.8 G/DL (ref 31–36)
MCV RBC AUTO: 91.4 FL (ref 80–100)
MONOCYTES ABSOLUTE: 0.5 K/UL (ref 0–1.3)
MONOCYTES RELATIVE PERCENT: 5.7 %
NEUTROPHILS ABSOLUTE: 7 K/UL (ref 1.7–7.7)
NEUTROPHILS RELATIVE PERCENT: 85.8 %
PDW BLD-RTO: 16.1 % (ref 12.4–15.4)
PHOSPHORUS: 3.4 MG/DL (ref 2.5–4.9)
PLATELET # BLD: 216 K/UL (ref 135–450)
PMV BLD AUTO: 8 FL (ref 5–10.5)
POTASSIUM REFLEX MAGNESIUM: 4.1 MMOL/L (ref 3.5–5.1)
POTASSIUM SERPL-SCNC: 4.1 MMOL/L (ref 3.5–5.1)
RBC # BLD: 3.9 M/UL (ref 4.2–5.9)
SODIUM BLD-SCNC: 135 MMOL/L (ref 136–145)
SODIUM BLD-SCNC: 135 MMOL/L (ref 136–145)
WBC # BLD: 8.2 K/UL (ref 4–11)

## 2022-06-25 PROCEDURE — 6370000000 HC RX 637 (ALT 250 FOR IP): Performed by: INTERNAL MEDICINE

## 2022-06-25 PROCEDURE — 85025 COMPLETE CBC W/AUTO DIFF WBC: CPT

## 2022-06-25 PROCEDURE — 99231 SBSQ HOSP IP/OBS SF/LOW 25: CPT | Performed by: SURGERY

## 2022-06-25 PROCEDURE — 2580000003 HC RX 258: Performed by: INTERNAL MEDICINE

## 2022-06-25 PROCEDURE — 6360000002 HC RX W HCPCS: Performed by: INTERNAL MEDICINE

## 2022-06-25 PROCEDURE — C9113 INJ PANTOPRAZOLE SODIUM, VIA: HCPCS | Performed by: INTERNAL MEDICINE

## 2022-06-25 PROCEDURE — 83735 ASSAY OF MAGNESIUM: CPT

## 2022-06-25 PROCEDURE — 80069 RENAL FUNCTION PANEL: CPT

## 2022-06-25 PROCEDURE — 1200000000 HC SEMI PRIVATE

## 2022-06-25 PROCEDURE — 36415 COLL VENOUS BLD VENIPUNCTURE: CPT

## 2022-06-25 RX ADMIN — SODIUM CHLORIDE, PRESERVATIVE FREE 10 ML: 5 INJECTION INTRAVENOUS at 21:07

## 2022-06-25 RX ADMIN — Medication 5 MG: at 21:06

## 2022-06-25 RX ADMIN — ENOXAPARIN SODIUM 40 MG: 100 INJECTION SUBCUTANEOUS at 08:00

## 2022-06-25 RX ADMIN — PANTOPRAZOLE SODIUM 40 MG: 40 INJECTION, POWDER, LYOPHILIZED, FOR SOLUTION INTRAVENOUS at 07:41

## 2022-06-25 RX ADMIN — ACETAMINOPHEN 650 MG: 325 TABLET ORAL at 07:41

## 2022-06-25 RX ADMIN — AMOXICILLIN AND CLAVULANATE POTASSIUM 1 TABLET: 875; 125 TABLET, FILM COATED ORAL at 21:06

## 2022-06-25 RX ADMIN — ACETAMINOPHEN 650 MG: 325 TABLET ORAL at 17:08

## 2022-06-25 RX ADMIN — SODIUM CHLORIDE, PRESERVATIVE FREE 10 ML: 5 INJECTION INTRAVENOUS at 07:43

## 2022-06-25 RX ADMIN — AMOXICILLIN AND CLAVULANATE POTASSIUM 1 TABLET: 875; 125 TABLET, FILM COATED ORAL at 08:00

## 2022-06-25 RX ADMIN — PANTOPRAZOLE SODIUM 40 MG: 40 INJECTION, POWDER, LYOPHILIZED, FOR SOLUTION INTRAVENOUS at 21:02

## 2022-06-25 ASSESSMENT — PAIN - FUNCTIONAL ASSESSMENT: PAIN_FUNCTIONAL_ASSESSMENT: ACTIVITIES ARE NOT PREVENTED

## 2022-06-25 ASSESSMENT — PAIN SCALES - WONG BAKER
WONGBAKER_NUMERICALRESPONSE: 0

## 2022-06-25 ASSESSMENT — PAIN SCALES - GENERAL
PAINLEVEL_OUTOF10: 3
PAINLEVEL_OUTOF10: 3

## 2022-06-25 ASSESSMENT — PAIN DESCRIPTION - DESCRIPTORS: DESCRIPTORS: DISCOMFORT

## 2022-06-25 ASSESSMENT — PAIN DESCRIPTION - LOCATION
LOCATION: GENERALIZED
LOCATION: LEG;ARM;GENERALIZED

## 2022-06-25 ASSESSMENT — PAIN DESCRIPTION - ORIENTATION: ORIENTATION: LEFT

## 2022-06-25 NOTE — PROGRESS NOTES
Interval History and plan:      BP acceptable  Stable creatinine 1.6    Plan:    Encourage oral intake  He is in stage IIIb chronic kidney disease since 2019. Creatinine is currently at baseline. He has 800 mg / day of proteinuria   Free light chain ratio is 2.3 acceptable for CKD   No need for any Anti HTN at this time                   Assessment :     Stage IIIb chronic kidney disease: He presented with a creatinine of 1.7. He has baseline creatinine fluctuates between 2.5-1.5. He has kidney disease since 2019. Anemia: Hemoglobin is 12 and there is no need for Procrit. Aspiration pneumonia/COVID-19: CT chest shows right lower lobe crowding and airspace disease. [facility] would like to thank Jaxon Dorsey MD   for opportunity to serve this patient      Please call with questions at-   24 Hrs Answering service [phone] or  7 am- 5 pm via Perfect serve or cell phone  Marya Saldaña MD          CC/reason for consult :     Renal insufficiency     HPI :     Anna Solis is a 80 y.o. male presented to   the hospital on 6/16/2022 with aspiration pneumonia    He has past medical history significant for chronic atrial fibrillation on Eliquis, aortic valve stenosis, chronic kidney disease stage III, history of COVID-19 pneumonia, rhabdomyolysis, severe protein calorie malnourishment. He is now admitted with aspiration pneumonia and is on antibiotics. He is also on isolation for COVID-19. Creatinine on arrival was 2.4. We are called for further management of renal insufficiency.     ROS:     Seen with-family in the room    positives in bold   Labs were obtained                All other remaining systems are negative or unable to obtain        PMH/PSH/SH/Family History:     Past Medical History:   Diagnosis Date    COVID-19 06/10/2022    [facility]       Past Surgical History:   Procedure Laterality Date    UPPER GASTROINTESTINAL ENDOSCOPY N/A 6/20/2022    EGD BIOPSY performed by Emily Piper MD at [place] [address]        reports that he has never smoked. He has never used smokeless tobacco. He reports previous alcohol use. He reports that he does not use drugs. family history is not on file. Medication:     Current Facility-Administered Medications: amoxicillin-clavulanate (AUGMENTIN) 875-125 MG per tablet 1 tablet, 1 tablet, Oral, BID  potassium chloride (KLOR-CON M) extended release tablet 40 mEq, 40 mEq, Oral, PRN **OR** potassium bicarb-citric acid (EFFER-K) effervescent tablet 40 mEq, 40 mEq, Oral, PRN **OR** potassium chloride 10 mEq/100 mL IVPB (Peripheral Line), 10 mEq, IntraVENous, PRN  magnesium sulfate 1000 mg in dextrose 5% 100 mL IVPB, 1,000 mg, IntraVENous, PRN  pantoprazole (PROTONIX) injection 40 mg, 40 mg, IntraVENous, BID  melatonin disintegrating tablet 5 mg, 5 mg, Oral, Nightly  sodium chloride flush 0.9 % injection 10 mL, 10 mL, IntraVENous, 2 times per day  sodium chloride flush 0.9 % injection 10 mL, 10 mL, IntraVENous, PRN  0.9 % sodium chloride infusion, , IntraVENous, PRN  enoxaparin (LOVENOX) injection 40 mg, 40 mg, SubCUTAneous, Daily  ondansetron (ZOFRAN) injection 4 mg, 4 mg, IntraVENous, Q4H PRN  polyethylene glycol (GLYCOLAX) packet 17 g, 17 g, Oral, Daily PRN  acetaminophen (TYLENOL) tablet 650 mg, 650 mg, Oral, Q4H PRN **OR** acetaminophen (TYLENOL) suppository 650 mg, 650 mg, Rectal, Q4H PRN       Vitals :     Vitals:    06/25/22 0741   BP: 116/78   Pulse: 82   Resp: 16   Temp: 98 °F (36.7 °C)   SpO2: 98%       I & O :       Intake/Output Summary (Last 24 hours) at 6/25/2022 0952  Last data filed at 6/25/2022 0747  Gross per 24 hour   Intake 850 ml   Output 375 ml   Net 475 ml        Physical Examination :     Minimal exam was performed to minimize exposure to COVID-19. Patient was seen and evaluated from the door.   Preserving PPE       LABS:     Recent Labs     06/23/22  0810 06/24/22  0924 06/25/22  0656   WBC 8.7 8.8 8.2   HGB 10.7* 11.0* 11.7*   HCT 31.3* 33.2* 35.6*    248 216     Recent Labs     06/22/22  1947 06/23/22  0810 06/23/22  0810 06/24/22  0924 06/25/22  0656   NA  --  137  --  136 135*  135*   K  --  3.7   < > 4.1 4.1  4.1   CL  --  105  --  105 105  106   CO2  --  20*  --  23 15*  15*   BUN  --  17  --  16 18  18   CREATININE  --  1.6*  --  1.6* 1.6*  1.6*   GLUCOSE  --  100*  --  93 93  93   MG 2.30  --   --   --  2.00   PHOS  --   --   --   --  3.4    < > = values in this interval not displayed.       Nephrology  [address] [place] [address]  Office: [phone]  Fax: [phone]

## 2022-06-25 NOTE — PROGRESS NOTES
Surgery Daily Progress Note      CC: PEG tube    SUBJECTIVE:  Patient rested well overnight. No pain this morning. Tolerating diet. Denies abd pain. ROS:   A 14 point review of systems was conducted, significant findings as noted above. All other systems negative. OBJECTIVE:    PHYSICAL EXAM:  Vitals:    06/24/22 0300 06/24/22 0755 06/24/22 2131 06/25/22 0014   BP: 122/80 119/82 105/60 109/78   Pulse: 87 88 74 76   Resp: 16 16 16 16   Temp: 97.6 °F (36.4 °C) 97.8 °F (36.6 °C) 98.2 °F (36.8 °C) 98.4 °F (36.9 °C)   TempSrc: Oral Oral Oral Oral   SpO2: 97% 98% 97% 97%   Weight:       Height:         CONSTITUTIONAL:  Pleasant, alert, thin  LUNGS:  Diminished to bases; Scattered rhonci, oxygen 2.5 l/nc  CARDIOVASCULAR:  Well perfused  ABDOMEN:  No scars, normal bowel sounds, soft, non-distended, non-tender, no masses palpated, no hepatosplenomegally  SKIN:  ecchymosis upper extremities      ASSESSMENT & PLAN:   80year old male admitted with aspiration pneumonia and Covid 19. He has history of atrial fib on metoprolol. Was on eliquis but recently admitted for a fall with rhabdomyolysis. He also has history of CKD being followed by Nephrology. - Tolerating dysphagia diet at this time, confirmed with nurse able to tolerate apple sauce  - No surgery in the setting of recent COVID and debility, would recommend nasoenteric feeding if supplemental feeding is needed  - Calorie counts today  - Family to discuss further wishes  - If family wishes to pursue  surgical enteral access in the future they can follow up with Dr. Mandy Vargas DO PGY-2  General Surgery  06/25/22  6:29 AM  [phone]    I have seen, examined, and reviewed the patients chart. I agree with the residents assessment and have made appropriate changes.     Reji Robins

## 2022-06-25 NOTE — PROGRESS NOTES
texture, turgor normal.  No rashes or lesions. Neurologic:  Neurovascularly intact without any focal sensory/motor deficits. Cranial nerves: II-XII intact, grossly non-focal.  Psychiatric: Alert and awake  Capillary Refill: Brisk,3 seconds, normal   Peripheral Pulses: +2 palpable, equal bilaterally       Labs:   Recent Labs     06/23/22  0810 06/24/22  0924 06/25/22  0656   WBC 8.7 8.8 8.2   HGB 10.7* 11.0* 11.7*   HCT 31.3* 33.2* 35.6*    248 216     Recent Labs     06/23/22  0810 06/23/22  0810 06/24/22  0924 06/25/22  0656     --  136 135*  135*   K 3.7   < > 4.1 4.1  4.1     --  105 105  106   CO2 20*  --  23 15*  15*   BUN 17  --  16 18  18   CREATININE 1.6*  --  1.6* 1.6*  1.6*   CALCIUM 7.5*  --  7.9* 7.7*  7.7*   PHOS  --   --   --  3.4    < > = values in this interval not displayed. No results for input(s): AST, ALT, BILIDIR, BILITOT, ALKPHOS in the last 72 hours. No results for input(s): INR in the last 72 hours. No results for input(s): Isabella Dross in the last 72 hours. Urinalysis:      Lab Results   Component Value Date    NITRU Negative 06/20/2022    WBCUA 0-2 06/20/2022    RBCUA  06/20/2022    BLOODU LARGE 06/20/2022    SPECGRAV 1.025 06/20/2022    GLUCOSEU Negative 06/20/2022       Radiology:  FL MODIFIED BARIUM SWALLOW W VIDEO   Final Result      1. Multilevel episodes of tracheal aspiration as detailed above. CT CHEST WO CONTRAST   Final Result   1. Right lower lobe bronchovascular crowding and airspace disease, small pneumonia with adjacent pleural thickening   2. Large hiatal hernia   3. Cardiomegaly with coronary artery calcification   4. Vertebral body fracture of L1 is age indeterminate. XR CHEST PORTABLE   Final Result   1. Cardiomegaly and aortic tortuosity   2. Minimal hazy density right lung base, differential radiolucency of the lungs is secondary to patient rotation   3.  Hypertrophic osteophytes of the shoulders, left greater than right              Assessment/Plan:    Active Hospital Problems    Diagnosis     Aspiration into airway [T17.908A]      Priority: Medium    Acute aspiration pneumonia ([address] [J69.0]      Priority: Medium    Acute respiratory failure with hypoxia (HCC) [J96.01]      Priority: Medium    COVID-19 viremia - Tested positive 06/09/2022 [U07.1]      Priority: Medium    Generalized weakness [R53.1]      Priority: Medium     Pneumonia, due to aspiration - Pt has been started on Unasyn  - Pt tested positive for the COVID-19 virus in 06/09/2022, and dos not appear to have COVID pneumonia  - Respiratory culture ordered  - Legionella pneumophilia and Strep pneumoniae urine antigens ordered     COVID-19 viremia - Tested positive 06/09/2022 - Provide symptomatic treatment     Acute respiratory failure with hypoxia (HCC) - due to aspiration pneumonia, not COVID. As evidenced by an oxygen saturation of less than 90% on room air. We will provide oxygen as needed to maintain an oxygen saturation of 92% or higher.     Aspiration   -Speech evaluated. -Recommend dysphagia minced&Moist solids, moderately Thick  MBS shows multilevel aspiration     Generalized weakness   - PT/OT to evaluate and treat patient, and if necessary to provide recommendations for post hospital therapy     Acute metabolic encephalopathy -we will provide supportive care    MARY vs CKD -improving  -Cr 2.45 on 6/11  -Continue IVF    DVT Prophylaxis: Lovenox  Diet: ADULT DIET;  Dysphagia - Minced and Moist; Moderately Thick (Honey)  ADULT ORAL NUTRITION SUPPLEMENT; Breakfast, Lunch, Dinner; Frozen Oral Supplement  Code Status: Full Code    PT/OT Eval Status: ordered    Dispo/plan-patient ready for discharge pending placement    Primo Solo MD

## 2022-06-25 NOTE — CARE COORDINATION
Case management is following for discharge planning. The chart was reviewed. A DC order is noted. The plan is to go to [facility]. [employer] pre-cert started in the afternoon 6/24. Anticipate DC early next week.     Electronically signed by NITZA Triplett RN-[facility]  [phone]

## 2022-06-25 NOTE — PLAN OF CARE
Problem: Pain  Goal: Verbalizes/displays adequate comfort level or baseline comfort level  Outcome: Progressing   Denies any pain at this time. Problem: Skin/Tissue Integrity  Goal: Absence of new skin breakdown  Description: 1. Monitor for areas of redness and/or skin breakdown  2. Assess vascular access sites hourly  3. Every 4-6 hours minimum:  Change oxygen saturation probe site  4. Every 4-6 hours:  If on nasal continuous positive airway pressure, respiratory therapy assess nares and determine need for appliance change or resting period.   Outcome: Progressing     Problem: Safety - Adult  Goal: Free from fall injury  Outcome: Progressing

## 2022-06-26 LAB
ALBUMIN SERPL-MCNC: 2.3 G/DL (ref 3.4–5)
ANION GAP SERPL CALCULATED.3IONS-SCNC: 11 MMOL/L (ref 3–16)
BASOPHILS ABSOLUTE: 0.1 K/UL (ref 0–0.2)
BASOPHILS RELATIVE PERCENT: 0.7 %
BUN BLDV-MCNC: 17 MG/DL (ref 7–20)
CALCIUM SERPL-MCNC: 7.9 MG/DL (ref 8.3–10.6)
CHLORIDE BLD-SCNC: 108 MMOL/L (ref 99–110)
CO2: 19 MMOL/L (ref 21–32)
CREAT SERPL-MCNC: 1.5 MG/DL (ref 0.8–1.3)
EOSINOPHILS ABSOLUTE: 0.2 K/UL (ref 0–0.6)
EOSINOPHILS RELATIVE PERCENT: 1.9 %
GFR AFRICAN AMERICAN: 54
GFR NON-AFRICAN AMERICAN: 44
GLUCOSE BLD-MCNC: 95 MG/DL (ref 70–99)
HCT VFR BLD CALC: 33.3 % (ref 40.5–52.5)
HEMOGLOBIN: 11.2 G/DL (ref 13.5–17.5)
LYMPHOCYTES ABSOLUTE: 0.6 K/UL (ref 1–5.1)
LYMPHOCYTES RELATIVE PERCENT: 7.3 %
MAGNESIUM: 1.9 MG/DL (ref 1.8–2.4)
MCH RBC QN AUTO: 28.7 PG (ref 26–34)
MCHC RBC AUTO-ENTMCNC: 33.6 G/DL (ref 31–36)
MCV RBC AUTO: 85.4 FL (ref 80–100)
MONOCYTES ABSOLUTE: 0.4 K/UL (ref 0–1.3)
MONOCYTES RELATIVE PERCENT: 5 %
NEUTROPHILS ABSOLUTE: 7 K/UL (ref 1.7–7.7)
NEUTROPHILS RELATIVE PERCENT: 85.1 %
PDW BLD-RTO: 15.8 % (ref 12.4–15.4)
PHOSPHORUS: 3.2 MG/DL (ref 2.5–4.9)
PLATELET # BLD: 245 K/UL (ref 135–450)
PMV BLD AUTO: 8 FL (ref 5–10.5)
POTASSIUM SERPL-SCNC: 3.7 MMOL/L (ref 3.5–5.1)
RBC # BLD: 3.9 M/UL (ref 4.2–5.9)
SODIUM BLD-SCNC: 138 MMOL/L (ref 136–145)
WBC # BLD: 8.3 K/UL (ref 4–11)

## 2022-06-26 PROCEDURE — 2580000003 HC RX 258: Performed by: INTERNAL MEDICINE

## 2022-06-26 PROCEDURE — C9113 INJ PANTOPRAZOLE SODIUM, VIA: HCPCS | Performed by: INTERNAL MEDICINE

## 2022-06-26 PROCEDURE — 6370000000 HC RX 637 (ALT 250 FOR IP): Performed by: INTERNAL MEDICINE

## 2022-06-26 PROCEDURE — 85025 COMPLETE CBC W/AUTO DIFF WBC: CPT

## 2022-06-26 PROCEDURE — 83735 ASSAY OF MAGNESIUM: CPT

## 2022-06-26 PROCEDURE — 36415 COLL VENOUS BLD VENIPUNCTURE: CPT

## 2022-06-26 PROCEDURE — 6360000002 HC RX W HCPCS: Performed by: INTERNAL MEDICINE

## 2022-06-26 PROCEDURE — 1200000000 HC SEMI PRIVATE

## 2022-06-26 PROCEDURE — 80069 RENAL FUNCTION PANEL: CPT

## 2022-06-26 RX ADMIN — AMOXICILLIN AND CLAVULANATE POTASSIUM 1 TABLET: 875; 125 TABLET, FILM COATED ORAL at 19:45

## 2022-06-26 RX ADMIN — ENOXAPARIN SODIUM 40 MG: 100 INJECTION SUBCUTANEOUS at 08:33

## 2022-06-26 RX ADMIN — SODIUM CHLORIDE, PRESERVATIVE FREE 10 ML: 5 INJECTION INTRAVENOUS at 08:34

## 2022-06-26 RX ADMIN — PANTOPRAZOLE SODIUM 40 MG: 40 INJECTION, POWDER, LYOPHILIZED, FOR SOLUTION INTRAVENOUS at 19:45

## 2022-06-26 RX ADMIN — SODIUM CHLORIDE, PRESERVATIVE FREE 10 ML: 5 INJECTION INTRAVENOUS at 23:16

## 2022-06-26 RX ADMIN — Medication 5 MG: at 19:45

## 2022-06-26 RX ADMIN — PANTOPRAZOLE SODIUM 40 MG: 40 INJECTION, POWDER, LYOPHILIZED, FOR SOLUTION INTRAVENOUS at 08:32

## 2022-06-26 RX ADMIN — ACETAMINOPHEN 650 MG: 325 TABLET ORAL at 17:27

## 2022-06-26 RX ADMIN — AMOXICILLIN AND CLAVULANATE POTASSIUM 1 TABLET: 875; 125 TABLET, FILM COATED ORAL at 08:32

## 2022-06-26 ASSESSMENT — PAIN SCALES - GENERAL
PAINLEVEL_OUTOF10: 0
PAINLEVEL_OUTOF10: 0

## 2022-06-26 ASSESSMENT — PAIN DESCRIPTION - DESCRIPTORS
DESCRIPTORS: ACHING;DISCOMFORT
DESCRIPTORS: ACHING

## 2022-06-26 ASSESSMENT — PAIN SCALES - WONG BAKER
WONGBAKER_NUMERICALRESPONSE: 0
WONGBAKER_NUMERICALRESPONSE: 4
WONGBAKER_NUMERICALRESPONSE: 0
WONGBAKER_NUMERICALRESPONSE: 4
WONGBAKER_NUMERICALRESPONSE: 0

## 2022-06-26 ASSESSMENT — PAIN DESCRIPTION - ORIENTATION
ORIENTATION: LEFT
ORIENTATION: LEFT

## 2022-06-26 ASSESSMENT — PAIN DESCRIPTION - LOCATION
LOCATION: KNEE
LOCATION: KNEE

## 2022-06-26 NOTE — PROGRESS NOTES
lesions. Neurologic:  Neurovascularly intact without any focal sensory/motor deficits. Cranial nerves: II-XII intact, grossly non-focal.  Psychiatric: Alert and awake  Capillary Refill: Brisk,3 seconds, normal   Peripheral Pulses: +2 palpable, equal bilaterally       Labs:   Recent Labs     06/24/22  0924 06/25/22  0656 06/26/22  0442   WBC 8.8 8.2 8.3   HGB 11.0* 11.7* 11.2*   HCT 33.2* 35.6* 33.3*    216 245     Recent Labs     06/24/22  0924 06/24/22 0924 06/25/22  0656 06/26/22  0442     --  135*  135* 138   K 4.1   < > 4.1  4.1 3.7     --  105  106 108   CO2 23  --  15*  15* 19*   BUN 16  --  18  18 17   CREATININE 1.6*  --  1.6*  1.6* 1.5*   CALCIUM 7.9*  --  7.7*  7.7* 7.9*   PHOS  --   --  3.4 3.2    < > = values in this interval not displayed. No results for input(s): AST, ALT, BILIDIR, BILITOT, ALKPHOS in the last 72 hours. No results for input(s): INR in the last 72 hours. No results for input(s): [place] in the last 72 hours. Urinalysis:      Lab Results   Component Value Date    NITRU Negative 06/20/2022    WBCUA 0-2 06/20/2022    RBCUA  06/20/2022    BLOODU LARGE 06/20/2022    SPECGRAV 1.025 06/20/2022    GLUCOSEU Negative 06/20/2022       Radiology:  FL MODIFIED BARIUM SWALLOW W VIDEO   Final Result      1. Multilevel episodes of tracheal aspiration as detailed above. CT CHEST WO CONTRAST   Final Result   1. Right lower lobe bronchovascular crowding and airspace disease, small pneumonia with adjacent pleural thickening   2. Large hiatal hernia   3. Cardiomegaly with coronary artery calcification   4. Vertebral body fracture of L1 is age indeterminate. XR CHEST PORTABLE   Final Result   1. Cardiomegaly and aortic tortuosity   2. Minimal hazy density right lung base, differential radiolucency of the lungs is secondary to patient rotation   3.  Hypertrophic osteophytes of the shoulders, left greater than right Assessment/Plan:    Active Hospital Problems    Diagnosis     Aspiration into airway [T17.908A]      Priority: Medium    Acute aspiration pneumonia ([facility] [address] [J69.0]      Priority: Medium    Acute respiratory failure with hypoxia (HCC) [J96.01]      Priority: Medium    COVID-19 viremia - Tested positive 06/09/2022 [U07.1]      Priority: Medium    Generalized weakness [R53.1]      Priority: Medium     Pneumonia, due to aspiration - Pt has been started on Unasyn  - Pt tested positive for the COVID-19 virus in 06/09/2022, and dos not appear to have COVID pneumonia  - Respiratory culture ordered  - Legionella pneumophilia and Strep pneumoniae urine antigens ordered     COVID-19 viremia - Tested positive 06/09/2022 - Provide symptomatic treatment     Acute respiratory failure with hypoxia (HCC) - due to aspiration pneumonia, not COVID. As evidenced by an oxygen saturation of less than 90% on room air. We will provide oxygen as needed to maintain an oxygen saturation of 92% or higher.     Aspiration   -Speech evaluated. -Recommend dysphagia minced&Moist solids, moderately Thick  MBS shows multilevel aspiration     Generalized weakness   - PT/OT to evaluate and treat patient, and if necessary to provide recommendations for post hospital therapy     Acute metabolic encephalopathy -we will provide supportive care    MARY vs CKD -improving  -Cr 2.45 on 6/11  -Continue IVF    DVT Prophylaxis: Lovenox  Diet: ADULT DIET;  Dysphagia - Minced and Moist; Moderately Thick (Honey)  ADULT ORAL NUTRITION SUPPLEMENT; Breakfast, Lunch, Dinner; Frozen Oral Supplement  Code Status: Full Code    PT/OT Eval Status: ordered    Dispo/plan-patient ready for discharge pending placement    Trinh Lam MD

## 2022-06-26 NOTE — PROGRESS NOTES
Interval History and plan:      Stable BP  Stable creatinine 1.5    Plan:    Encourage oral intake  He is in stage IIIb chronic kidney disease since 2019. Creatinine is currently at baseline. He has 800 mg / day of proteinuria   Free light chain ratio is 2.3 acceptable for CKD   Cont to hold home Anti HTN for now  Volume stable                   Assessment :     Stage IIIb chronic kidney disease: He presented with a creatinine of 1.7. He has baseline creatinine fluctuates between 2.5-1.5. He has kidney disease since 2019. Anemia: Hemoglobin is 12 and there is no need for Procrit. Aspiration pneumonia/COVID-19: CT chest shows right lower lobe crowding and airspace disease. [facility] would like to thank Melvi Dimas MD   for opportunity to serve this patient      Please call with questions at-   24 Hrs Answering service [phone] or  7 am- 5 pm via Perfect serve or cell phone  Mia Quiroz MD          CC/reason for consult :     Renal insufficiency     HPI :     Addis Mclaughlin is a 80 y.o. male presented to   the hospital on 6/16/2022 with aspiration pneumonia    He has past medical history significant for chronic atrial fibrillation on Eliquis, aortic valve stenosis, chronic kidney disease stage III, history of COVID-19 pneumonia, rhabdomyolysis, severe protein calorie malnourishment. He is now admitted with aspiration pneumonia and is on antibiotics. He is also on isolation for COVID-19. Creatinine on arrival was 2.4. We are called for further management of renal insufficiency.     ROS:     Seen with-family in the room    positives in bold   Labs were obtained                All other remaining systems are negative or unable to obtain        PMH/PSH/SH/Family History:     Past Medical History:   Diagnosis Date    COVID-19 06/10/2022    [facility]       Past Surgical History:   Procedure Laterality Date    UPPER GASTROINTESTINAL ENDOSCOPY N/A 6/20/2022    EGD BIOPSY performed by Rangel Correa MD at [place] [address]        reports that he has never smoked. He has never used smokeless tobacco. He reports previous alcohol use. He reports that he does not use drugs. family history is not on file. Medication:     Current Facility-Administered Medications: amoxicillin-clavulanate (AUGMENTIN) 875-125 MG per tablet 1 tablet, 1 tablet, Oral, BID  potassium chloride (KLOR-CON M) extended release tablet 40 mEq, 40 mEq, Oral, PRN **OR** potassium bicarb-citric acid (EFFER-K) effervescent tablet 40 mEq, 40 mEq, Oral, PRN **OR** potassium chloride 10 mEq/100 mL IVPB (Peripheral Line), 10 mEq, IntraVENous, PRN  magnesium sulfate 1000 mg in dextrose 5% 100 mL IVPB, 1,000 mg, IntraVENous, PRN  pantoprazole (PROTONIX) injection 40 mg, 40 mg, IntraVENous, BID  melatonin disintegrating tablet 5 mg, 5 mg, Oral, Nightly  sodium chloride flush 0.9 % injection 10 mL, 10 mL, IntraVENous, 2 times per day  sodium chloride flush 0.9 % injection 10 mL, 10 mL, IntraVENous, PRN  0.9 % sodium chloride infusion, , IntraVENous, PRN  enoxaparin (LOVENOX) injection 40 mg, 40 mg, SubCUTAneous, Daily  ondansetron (ZOFRAN) injection 4 mg, 4 mg, IntraVENous, Q4H PRN  polyethylene glycol (GLYCOLAX) packet 17 g, 17 g, Oral, Daily PRN  acetaminophen (TYLENOL) tablet 650 mg, 650 mg, Oral, Q4H PRN **OR** acetaminophen (TYLENOL) suppository 650 mg, 650 mg, Rectal, Q4H PRN       Vitals :     Vitals:    06/26/22 0818   BP: 120/84   Pulse: 84   Resp: 18   Temp: 97.6 °F (36.4 °C)   SpO2: 98%       I & O :       Intake/Output Summary (Last 24 hours) at 6/26/2022 1009  Last data filed at 6/26/2022 0949  Gross per 24 hour   Intake 1260 ml   Output 850 ml   Net 410 ml        Physical Examination :     Minimal exam was performed to minimize exposure to COVID-19. Patient was seen and evaluated from the door.   Preserving PPE       LABS:     Recent Labs     06/24/22  0924 06/25/22  0656 06/26/22  0442   WBC 8.8 8.2 8.3 HGB 11.0* 11.7* 11.2*   HCT 33.2* 35.6* 33.3*    216 245     Recent Labs     06/24/22  0924 06/24/22  0924 06/25/22  0656 06/26/22  0442     --  135*  135* 138   K 4.1   < > 4.1  4.1 3.7     --  105  106 108   CO2 23  --  15*  15* 19*   BUN 16  --  18  18 17   CREATININE 1.6*  --  1.6*  1.6* 1.5*   GLUCOSE 93  --  93  93 95   MG  --   --  2.00 1.90   PHOS  --   --  3.4 3.2    < > = values in this interval not displayed.       Nephrology  [facility]   [address]  Office: [phone]  Fax: [phone]

## 2022-06-27 LAB
ANION GAP SERPL CALCULATED.3IONS-SCNC: 12 MMOL/L (ref 3–16)
BASOPHILS ABSOLUTE: 0 K/UL (ref 0–0.2)
BASOPHILS RELATIVE PERCENT: 0.3 %
BUN BLDV-MCNC: 16 MG/DL (ref 7–20)
CALCIUM SERPL-MCNC: 7.6 MG/DL (ref 8.3–10.6)
CHLORIDE BLD-SCNC: 107 MMOL/L (ref 99–110)
CO2: 19 MMOL/L (ref 21–32)
CREAT SERPL-MCNC: 1.5 MG/DL (ref 0.8–1.3)
EOSINOPHILS ABSOLUTE: 0.1 K/UL (ref 0–0.6)
EOSINOPHILS RELATIVE PERCENT: 1.5 %
GFR AFRICAN AMERICAN: 54
GFR NON-AFRICAN AMERICAN: 44
GLUCOSE BLD-MCNC: 88 MG/DL (ref 70–99)
HCT VFR BLD CALC: 33.6 % (ref 40.5–52.5)
HEMOGLOBIN: 10.9 G/DL (ref 13.5–17.5)
LYMPHOCYTES ABSOLUTE: 0.4 K/UL (ref 1–5.1)
LYMPHOCYTES RELATIVE PERCENT: 6.1 %
MCH RBC QN AUTO: 29.2 PG (ref 26–34)
MCHC RBC AUTO-ENTMCNC: 32.5 G/DL (ref 31–36)
MCV RBC AUTO: 89.9 FL (ref 80–100)
MONOCYTES ABSOLUTE: 0.5 K/UL (ref 0–1.3)
MONOCYTES RELATIVE PERCENT: 6.9 %
NEUTROPHILS ABSOLUTE: 6.1 K/UL (ref 1.7–7.7)
NEUTROPHILS RELATIVE PERCENT: 85.2 %
PDW BLD-RTO: 15.9 % (ref 12.4–15.4)
PLATELET # BLD: 251 K/UL (ref 135–450)
PMV BLD AUTO: 7.9 FL (ref 5–10.5)
POTASSIUM REFLEX MAGNESIUM: 4.1 MMOL/L (ref 3.5–5.1)
RBC # BLD: 3.74 M/UL (ref 4.2–5.9)
SODIUM BLD-SCNC: 138 MMOL/L (ref 136–145)
SPE/IFE INTERPRETATION: NORMAL
WBC # BLD: 7.2 K/UL (ref 4–11)

## 2022-06-27 PROCEDURE — 6370000000 HC RX 637 (ALT 250 FOR IP): Performed by: INTERNAL MEDICINE

## 2022-06-27 PROCEDURE — 97530 THERAPEUTIC ACTIVITIES: CPT

## 2022-06-27 PROCEDURE — 6360000002 HC RX W HCPCS: Performed by: INTERNAL MEDICINE

## 2022-06-27 PROCEDURE — 2700000000 HC OXYGEN THERAPY PER DAY

## 2022-06-27 PROCEDURE — C9113 INJ PANTOPRAZOLE SODIUM, VIA: HCPCS | Performed by: INTERNAL MEDICINE

## 2022-06-27 PROCEDURE — 1200000000 HC SEMI PRIVATE

## 2022-06-27 PROCEDURE — 97535 SELF CARE MNGMENT TRAINING: CPT

## 2022-06-27 PROCEDURE — 94761 N-INVAS EAR/PLS OXIMETRY MLT: CPT

## 2022-06-27 PROCEDURE — 80048 BASIC METABOLIC PNL TOTAL CA: CPT

## 2022-06-27 PROCEDURE — 2580000003 HC RX 258: Performed by: INTERNAL MEDICINE

## 2022-06-27 PROCEDURE — 85025 COMPLETE CBC W/AUTO DIFF WBC: CPT

## 2022-06-27 PROCEDURE — 36415 COLL VENOUS BLD VENIPUNCTURE: CPT

## 2022-06-27 RX ADMIN — SODIUM CHLORIDE, PRESERVATIVE FREE 10 ML: 5 INJECTION INTRAVENOUS at 10:56

## 2022-06-27 RX ADMIN — PANTOPRAZOLE SODIUM 40 MG: 40 INJECTION, POWDER, LYOPHILIZED, FOR SOLUTION INTRAVENOUS at 20:46

## 2022-06-27 RX ADMIN — ENOXAPARIN SODIUM 40 MG: 100 INJECTION SUBCUTANEOUS at 10:56

## 2022-06-27 RX ADMIN — ACETAMINOPHEN 650 MG: 325 TABLET ORAL at 00:40

## 2022-06-27 RX ADMIN — PANTOPRAZOLE SODIUM 40 MG: 40 INJECTION, POWDER, LYOPHILIZED, FOR SOLUTION INTRAVENOUS at 10:56

## 2022-06-27 RX ADMIN — Medication 5 MG: at 20:50

## 2022-06-27 RX ADMIN — ACETAMINOPHEN 650 MG: 325 TABLET ORAL at 15:10

## 2022-06-27 RX ADMIN — ACETAMINOPHEN 650 MG: 325 TABLET ORAL at 20:50

## 2022-06-27 RX ADMIN — AMOXICILLIN AND CLAVULANATE POTASSIUM 1 TABLET: 875; 125 TABLET, FILM COATED ORAL at 10:56

## 2022-06-27 RX ADMIN — SODIUM CHLORIDE, PRESERVATIVE FREE 10 ML: 5 INJECTION INTRAVENOUS at 20:50

## 2022-06-27 RX ADMIN — AMOXICILLIN AND CLAVULANATE POTASSIUM 1 TABLET: 875; 125 TABLET, FILM COATED ORAL at 20:50

## 2022-06-27 ASSESSMENT — PAIN SCALES - WONG BAKER
WONGBAKER_NUMERICALRESPONSE: 0

## 2022-06-27 ASSESSMENT — PAIN DESCRIPTION - LOCATION: LOCATION: LEG

## 2022-06-27 ASSESSMENT — PAIN DESCRIPTION - ORIENTATION: ORIENTATION: LEFT

## 2022-06-27 ASSESSMENT — PAIN SCALES - GENERAL: PAINLEVEL_OUTOF10: 3

## 2022-06-27 ASSESSMENT — PAIN DESCRIPTION - DESCRIPTORS: DESCRIPTORS: ACHING

## 2022-06-27 NOTE — PROGRESS NOTES
Occupational Therapy  Facility[facility]Department: [facility]  Name: Darylene Grant  : 1935  MRN: [id number]  Date of Service: 2022    Discharge Recommendations: Darylene Grant scored a 10/24 on the AM-PAC ADL Inpatient form. Current research shows that an AM-PAC score of 17 or less is typically not associated with a discharge to the patient's home setting. Based on the patient's AM-PAC score and their current ADL deficits, it is recommended that the patient have 3-5 sessions per week of Occupational Therapy at d/c to increase the patient's independence. Please see assessment section for further patient specific details. If patient discharges prior to next session this note will serve as a discharge summary. Please see below for the latest assessment towards goals. OT Equipment Recommendations  Other: defer       Patient Diagnosis(es): The primary encounter diagnosis was Aspiration into airway, initial encounter. A diagnosis of Dysphagia, unspecified type was also pertinent to this visit. Past Medical History:  has a past medical history of COVID-19. Past Surgical History:  has a past surgical history that includes Upper gastrointestinal endoscopy (N/A, 2022). Treatment Diagnosis: impaired mobility, transfers, ADL      Assessment   Assessment: Pt admit from rehab facility [address] [place] [address]. Max for bed mobility, sit EOB CGA to SBA ~10-12 min, grooming ADLs EOB. LE ROM. UE ROM in bed. Limited by pain and cognition. Would benefit from cont OT while in acute care and cont inpt at [facility].   Treatment Diagnosis: impaired mobility, transfers, ADL  REQUIRES OT FOLLOW-UP: Yes  Activity Tolerance  Activity Tolerance: Patient limited by fatigue;Patient limited by pain;Treatment limited secondary to decreased cognition        Plan   Plan  Times per Week: 2-5  Times per Day: Daily     Restrictions  Position Activity Restriction  Other position/activity restrictions: up with assist    Subjective   General  Chart Reviewed: Yes  Patient assessed for rehabilitation services?: Yes  Additional Pertinent Hx: 80y.o. year-old male who was admitted to [facility] 1 week ago for falls and increased weakness. He was found to have COVID-19 but had no symptoms other than increased weakness. While there he failed a swallow evaluation and was advised to follow a dysphagia diet with thickened liquids. He was discharged yesterday to [facility] for physical therapy rehab. Apparently the recommended diet was not followed and he aspirated. He developed shortness of breath and an x-ray was done which was consistent with aspiration. On evaluation in the emergency room he was found to have a new oxygen requirement associated with aspiration pneumonia. Referring Practitioner: Ted Galvin MD  Diagnosis: aspiration  Subjective  Subjective: In bed agreeable to session     Social/Functional History  Social/Functional History  Lives With: Alone,Daughter  Type of Home: House  Home Layout: One level,Able to Live on Main level with bedroom/bathroom  Home Access: Stairs to enter with rails  Entrance Stairs - Number of Steps: 2  Entrance Stairs - Rails: Both  Bathroom Shower/Tub: Walk-in shower  Bathroom Equipment: Grab bars in shower,Shower chair  Bathroom Accessibility: Wheelchair accessible  Home Equipment: Walker, rolling,Cane  Has the patient had two or more falls in the past year or any fall with injury in the past year?: Yes  Receives Help From: Family  ADL Assistance: Independent  Toileting: Independent  Homemaking Assistance: Independent  Ambulation Assistance: Needs assistance  Transfer Assistance: Independent  Active : No  Occupation: Retired  Additional Comments: History was taken by daughter present in the room. Pt was living independently before the fall and is planning to go home with daughter who has a handicap suite in her house.        Objective   Heart Rate:

## 2022-06-27 NOTE — PROGRESS NOTES
Physical Therapy  Facility/Department: [address] [place] [address]  Physical Therapy Treatment    Name: Yadiel Charles  : 1935  MRN: [id number]  Date of Service: 2022    Discharge Recommendations:    Yadiel Charles scored a 8/24 on the AM-PAC short mobility form. Current research shows that an AM-PAC score of 17 or less is typically not associated with a discharge to the patient's home setting. Based on the patient's AM-PAC score and their current functional mobility deficits, it is recommended that the patient have 3-5 sessions per week of Physical Therapy at d/c to increase the patient's independence. Please see assessment section for further patient specific details. If patient discharges prior to next session this note will serve as a discharge summary. Please see below for the latest assessment towards goals. DME - defer to next LOC       Patient Diagnosis(es): The primary encounter diagnosis was Aspiration into airway, initial encounter. A diagnosis of Dysphagia, unspecified type was also pertinent to this visit. Past Medical History:  has a past medical history of COVID-19. Past Surgical History:  has a past surgical history that includes Upper gastrointestinal endoscopy (N/A, 2022). Assessment   Assessment: Pt demonstrated improved ability to participate with PT today, still notes pain with LE ROM but did perform knee extension ex on each LE, did some self care with RUE. Recommend continued IP therapies to improve mobility . Activity Tolerance  Activity Tolerance: Patient tolerated treatment well;Patient limited by endurance; Patient limited by pain  Activity Tolerance Comments: Pt more engaged today with treatment, able to move LE's and perform some self care     Plan   Plan  Plan:  ((2-5))  Current Treatment Recommendations: Strengthening,ROM,Functional mobility training,Transfer training,Patient/Caregiver education & training,Pain management,Gait training,Stair training,Endurance training  Safety Devices  Type of Devices: Left in bed,Call light within reach,Bed alarm in place,Nurse notified,All fall risk precautions in place,Patient at risk for falls     Restrictions  Position Activity Restriction  Other position/activity restrictions: up with assist     Subjective   Subjective  Subjective: Pt in bed upon PT entry, agreeable to working with PT         Social/Functional History  Social/Functional History  Lives With: Alone,Daughter  Type of Home: House  Home Layout: One level,Able to Live on Main level with bedroom/bathroom  Home Access: Stairs to enter with rails  Entrance Stairs - Number of Steps: 2  Entrance Stairs - Rails: Both  Bathroom Shower/Tub: Walk-in shower  Bathroom Equipment: Grab bars in shower,Shower chair  Bathroom Accessibility: Wheelchair accessible  Home Equipment: Walker, rolling,Cane  Has the patient had two or more falls in the past year or any fall with injury in the past year?: Yes  Receives Help From: Family  ADL Assistance: Independent  Toileting: Independent  Homemaking Assistance: Independent  Ambulation Assistance: Needs assistance  Transfer Assistance: Independent  Active : No  Occupation: Retired  Additional Comments: History was taken by daughter present in the room. Pt was living independently before the fall and is planning to go home with daughter who has a handicap suite in her house. Objective   Heart Rate: 76  Heart Rate Source: Monitor  BP: 125/81  BP Location: Left upper arm  BP Method: Automatic  Patient Position: Semi fowlers; Lying right side  MAP (Calculated): 95.67  Resp: 18  SpO2: 99 %  O2 Device: Nasal cannula       Bed mobility  Rolling to Left: Maximum assistance  Rolling to Right: Maximum assistance  Supine to Sit: Maximum assistance  Sit to Supine: Maximum assistance;2 Person assistance  Scooting: Dependent/Total;2 Person assistance  Bed Mobility Comments: Pt sat on eob for 12 minutes, did some hair combing, brought washcloth to face, LE ROM     Balance  Comments: Pt sat on eob 12 min with assist from min to CG. Able to perform knee extension x 3 each LE partial range,  combed hair with right hand, brought washcloth to face with right hand.        AM-PAC Score  AM-PAC Inpatient Mobility Raw Score : 8 (06/27/22 1624)  AM-PAC Inpatient T-Scale Score : 28.52 (06/27/22 1624)  Mobility Inpatient CMS 0-100% Score: 86.62 (06/27/22 1624)  Mobility Inpatient CMS G-Code Modifier : CM (06/27/22 1624)          Goals  Short Term Goals  Time Frame for Short term goals: discharge  ongoing 6/27  Short term goal 1: Rolling Left Mod A, on going  Short term goal 2: Supine to sit Mod A, on going  Short term goal 3: Sitting balance for 8 minutes with no LOB, on going  Patient Goals   Patient goals : Not stated       Education - safety, bed mobility, self care       Therapy Time   Individual Concurrent Group Co-treatment   Time In 1150         Time Out 1228         Minutes 38              Timed Code Treatment Minutes:    38  Total Treatment Minutes:  Anthony Germain, [id number]

## 2022-06-27 NOTE — PROGRESS NOTES
Interval History and plan:      Stable BP  Stable creatinine 1.5    Plan:    Encourage oral intake  He is in stage IIIb chronic kidney disease since 2019. Creatinine is currently at baseline. He has 800 mg / day of proteinuria   Free light chain ratio is 2.3 acceptable for CKD   Cont to hold home Anti HTN for now  Volume stable                   Assessment :     Stage IIIb chronic kidney disease: He presented with a creatinine of 1.7. He has baseline creatinine fluctuates between 2.5-1.5. He has kidney disease since 2019. Anemia: Hemoglobin is 12 and there is no need for Procrit. Aspiration pneumonia/COVID-19: CT chest shows right lower lobe crowding and airspace disease. [facility] would like to thank Rocky Jha MD   for opportunity to serve this patient      Please call with questions at-   24 Hrs Answering service [phone] or  7 am- 5 pm via Perfect serve or cell phone  Gabriel Hines MD          CC/reason for consult :     Renal insufficiency     HPI :     Tony Apodaca is a 80 y.o. male presented to   the hospital on 6/16/2022 with aspiration pneumonia    He has past medical history significant for chronic atrial fibrillation on Eliquis, aortic valve stenosis, chronic kidney disease stage III, history of COVID-19 pneumonia, rhabdomyolysis, severe protein calorie malnourishment. He is now admitted with aspiration pneumonia and is on antibiotics. He is also on isolation for COVID-19. Creatinine on arrival was 2.4. We are called for further management of renal insufficiency.     ROS:     Seen with-family in the room    positives in bold   Labs were obtained                All other remaining systems are negative or unable to obtain        PMH/PSH/SH/Family History:     Past Medical History:   Diagnosis Date    COVID-19 06/10/2022    [facility]       Past Surgical History:   Procedure Laterality Date    UPPER GASTROINTESTINAL ENDOSCOPY N/A 6/20/2022    EGD BIOPSY performed by Richie Sanders MD at [facility] 28        reports that he has never smoked. He has never used smokeless tobacco. He reports previous alcohol use. He reports that he does not use drugs. family history is not on file. Medication:     Current Facility-Administered Medications: amoxicillin-clavulanate (AUGMENTIN) 875-125 MG per tablet 1 tablet, 1 tablet, Oral, BID  potassium chloride (KLOR-CON M) extended release tablet 40 mEq, 40 mEq, Oral, PRN **OR** potassium bicarb-citric acid (EFFER-K) effervescent tablet 40 mEq, 40 mEq, Oral, PRN **OR** potassium chloride 10 mEq/100 mL IVPB (Peripheral Line), 10 mEq, IntraVENous, PRN  magnesium sulfate 1000 mg in dextrose 5% 100 mL IVPB, 1,000 mg, IntraVENous, PRN  pantoprazole (PROTONIX) injection 40 mg, 40 mg, IntraVENous, BID  melatonin disintegrating tablet 5 mg, 5 mg, Oral, Nightly  sodium chloride flush 0.9 % injection 10 mL, 10 mL, IntraVENous, 2 times per day  sodium chloride flush 0.9 % injection 10 mL, 10 mL, IntraVENous, PRN  0.9 % sodium chloride infusion, , IntraVENous, PRN  enoxaparin (LOVENOX) injection 40 mg, 40 mg, SubCUTAneous, Daily  ondansetron (ZOFRAN) injection 4 mg, 4 mg, IntraVENous, Q4H PRN  polyethylene glycol (GLYCOLAX) packet 17 g, 17 g, Oral, Daily PRN  acetaminophen (TYLENOL) tablet 650 mg, 650 mg, Oral, Q4H PRN **OR** acetaminophen (TYLENOL) suppository 650 mg, 650 mg, Rectal, Q4H PRN       Vitals :     Vitals:    06/27/22 0838   BP: 118/79   Pulse: 85   Resp: 18   Temp: 97.7 °F (36.5 °C)   SpO2: 97%       I & O :       Intake/Output Summary (Last 24 hours) at 6/27/2022 1008  Last data filed at 6/27/2022 0941  Gross per 24 hour   Intake 1020 ml   Output 1050 ml   Net -30 ml        Physical Examination :     Minimal exam was performed to minimize exposure to COVID-19. Patient was seen and evaluated from the door.   Preserving PPE       LABS:     Recent Labs     06/25/22  0656 06/26/22  0442 06/27/22  0700   WBC 8.2 8.3 7.2 HGB 11.7* 11.2* 10.9*   HCT 35.6* 33.3* 33.6*    245 251     Recent Labs     06/25/22  0656 06/26/22  0442 06/27/22  0700   *  135* 138 138   K 4.1  4.1 3.7 4.1     106 108 107   CO2 15*  15* 19* 19*   BUN 18  18 17 16   CREATININE 1.6*  1.6* 1.5* 1.5*   GLUCOSE 93  93 95 88   MG 2.00 1.90  --    PHOS 3.4 3.2  --       Nephrology  [address], [address]  Office: [phone]  Fax: [phone]

## 2022-06-27 NOTE — CARE COORDINATION
Case Management Assessment            Discharge Note                    Date / Time of Note: 6/27/2022 8:58 AM                  Discharge Note Completed by: EDUARD Ro    Patient Name: Richar Boucher   YOB: 1935  Diagnosis: Aspiration into airway, initial encounter [T17.908A]  Acute aspiration pneumonia ([facility] [address] [J69.0]   Date / Time: 6/16/2022  3:05 PM    Current PCP: Maria Ines Roach MD  Clinic patient: No    Hospitalization in the last 30 days: No    Advance Directives:  Code Status: Full Code  [place] DNR form completed and on chart: No    Financial:  Payor: Ashley Hatfield / Plan: Helen Celestin PPO / Product Type: Medicare /      Pharm[facility]:    [id number] [address]  [address]  [address] [address]  Phone: [phone] Fax: [phone]      Assistance purchasing medications?: Potential Assistance Purchasing Medications: Yes  Assistance provided by Case Management: None at this time    Does patient want to participate in local refill/ meds to beds program?: No    Meds To Beds General Rules:  1. Can ONLY be done Monday- Friday between 8:30am-5pm  2. Prescription(s) must be in pharmacy by 3pm to be filled same day  3. Copy of patient's insurance/ prescription drug card and patient face sheet must be sent along with the prescription(s)  4. Cost of Rx cannot be added to hospital bill. If financial assistance is needed, please contact unit  or ;  or  CANNOT provide pharmacy voucher for patients co-pays  5.  Patients can then  the prescription on their way out of the hospital at discharge, or pharmacy can deliver to the bedside if staff is available. (payment due at time of pick-up or delivery - cash, check, or card accepted)     Able to afford home medications/ co-pay costs: Yes    ADLS:  Current PT AM-PAC Score: 6 /24  Current OT AM-PAC Score: 10 /24      DISCHARGE Disposition: [facility] Brown (SNF): Precert pending for [place] Phone: [phone] Fax: [phone]    LOC at discharge: Skilled  CLARENCE Completed: Yes    Additional CM Notes: Plan to DC to [facility], precert is pending, submitted 6/24. TERESO spoke with [facility] in admissions ([phone]), she advised precert not back yet, may need updated therapy notes. She agreed to call SW as as precert comes in. TERESO will schedule transport. UPDATE: TERESO spoke with [facility] in admissions, insurance is requesting updated therapy notes for precert. Staff updated. The Plan for Transition of Care is related to the following treatment goals of Aspiration into airway, initial encounter [T17.908A]  Acute aspiration pneumonia ([address] [J69.0]    The Patient and/or patient representative Stiven Damian and his family were provided with a choice of provider and agrees with the discharge plan Yes    Freedom of choice list was provided with basic dialogue that supports the patient's individualized plan of care/goals and shares the quality data associated with the providers.  Yes    Care Transitions patient: No    EDUARD Castillo  [facility]  Case Management Department  Ph: [phone]  Fax: [phone]

## 2022-06-27 NOTE — PLAN OF CARE
Problem: Respiratory - Adult  Goal: Achieves optimal ventilation and oxygenation  6/27/2022 1447 by Saba Smith RN  Note: Respirations easy, and breath sounds diminished. He is on oxygen at 2L per cannula, which is his baseline. No distress noted. Occasional moist cough with clear sputum. Problem: Discharge Planning  Goal: Discharge to home or other facility with appropriate resources  6/27/2022 1447 by Saba Smith RN  Note: Discharge pending precert. Problem: Pain  Goal: Verbalizes/displays adequate comfort level or baseline comfort level  6/27/2022 1447 by Saba Smith RN  Note: No complaints of pain. Will monitor. Problem: Safety - Adult  Goal: Free from fall injury  6/27/2022 1447 by Saba Smith RN  Note: He is a fall risk. Door open, and bed alarm on. Call light in reach. Problem: Skin/Tissue Integrity  Goal: Absence of new skin breakdown  Description: 1. Monitor for areas of redness and/or skin breakdown  2. Assess vascular access sites hourly  3. Every 4-6 hours minimum:  Change oxygen saturation probe site  4. Every 4-6 hours:  If on nasal continuous positive airway pressure, respiratory therapy assess nares and determine need for appliance change or resting period. 6/27/2022 1447 by Saba Smith RN  Note: Benjamin catheter in place. No breakdown noted. He has scattered bruising.

## 2022-06-27 NOTE — PLAN OF CARE
Problem: Pain  Goal: Verbalizes/displays adequate comfort level or baseline comfort level  6/27/2022 0102 by Joy Rojas RN  Outcome: Progressing   Pt complained of right knee pain, medicated with Tylenol 650 mg crushed in applesauce. Vital signs stable, afebrile. Will continue to monitor alteration in comfort.

## 2022-06-27 NOTE — PROGRESS NOTES
Hospitalist Progress Note      PCP: [facility] MD    Date of Admission: 6/16/2022    Chief Complaint: Aspiration Pneumonia    Hospital Course: H&P    Subjective: And examined at bedside. Patient alert awake. Doing well. Waiting for placement. Per staff patient tolerating minced and moist diet however needing one-to-one feeding assistance    Medications:  Reviewed    Infusion Medications    sodium chloride 25 mL (06/23/22 0528)     Scheduled Medications    amoxicillin-clavulanate  1 tablet Oral BID    pantoprazole  40 mg IntraVENous BID    melatonin  5 mg Oral Nightly    sodium chloride flush  10 mL IntraVENous 2 times per day    enoxaparin  40 mg SubCUTAneous Daily     PRN Meds: potassium chloride **OR** potassium alternative oral replacement **OR** potassium chloride, magnesium sulfate, sodium chloride flush, sodium chloride, ondansetron, polyethylene glycol, acetaminophen **OR** acetaminophen      Intake/Output Summary (Last 24 hours) at 6/27/2022 1336  Last data filed at 6/27/2022 0941  Gross per 24 hour   Intake 820 ml   Output 1050 ml   Net -230 ml       Physical Exam Performed:    /75   Pulse 86   Temp 98.1 °F (36.7 °C) (Oral)   Resp 18   Ht 5' 10\" (1.778 m)   Wt 156 lb (70.8 kg)   SpO2 99%   BMI 22.38 kg/m²     General appearance: NAD, lying in bed, holding conversations  HEENT: Pupils equal, round, and reactive to light. Conjunctivae/corneas clear. Neck: Supple, with full range of motion. No jugular venous distention. Trachea midline. Respiratory:  Normal respiratory effort. Clear to auscultation, bilaterally without Rales/Wheezes/Rhonchi. Cardiovascular: Regular rate and rhythm with normal S1/S2 without murmurs, rubs or gallops. Abdomen: Soft, non-tender, non-distended with normal bowel sounds. Musculoskeletal: No clubbing, cyanosis or edema bilaterally. Full range of motion without deformity.   Skin: Skin color, texture, turgor normal.  No rashes or lesions. Neurologic:  Neurovascularly intact without any focal sensory/motor deficits. Cranial nerves: II-XII intact, grossly non-focal.  Psychiatric: Alert and awake  Capillary Refill: Brisk,3 seconds, normal   Peripheral Pulses: +2 palpable, equal bilaterally       Labs:   Recent Labs     06/25/22  0656 06/26/22  0442 06/27/22  0700   WBC 8.2 8.3 7.2   HGB 11.7* 11.2* 10.9*   HCT 35.6* 33.3* 33.6*    245 251     Recent Labs     06/25/22  0656 06/26/22  0442 06/27/22  0700   *  135* 138 138   K 4.1  4.1 3.7 4.1     106 108 107   CO2 15*  15* 19* 19*   BUN 18  18 17 16   CREATININE 1.6*  1.6* 1.5* 1.5*   CALCIUM 7.7*  7.7* 7.9* 7.6*   PHOS 3.4 3.2  --      No results for input(s): AST, ALT, BILIDIR, BILITOT, ALKPHOS in the last 72 hours. No results for input(s): INR in the last 72 hours. No results for input(s): Assunta Leyva in the last 72 hours. Urinalysis:      Lab Results   Component Value Date    NITRU Negative 06/20/2022    WBCUA 0-2 06/20/2022    RBCUA  06/20/2022    BLOODU LARGE 06/20/2022    SPECGRAV 1.025 06/20/2022    GLUCOSEU Negative 06/20/2022       Radiology:  FL MODIFIED BARIUM SWALLOW W VIDEO   Final Result      1. Multilevel episodes of tracheal aspiration as detailed above. CT CHEST WO CONTRAST   Final Result   1. Right lower lobe bronchovascular crowding and airspace disease, small pneumonia with adjacent pleural thickening   2. Large hiatal hernia   3. Cardiomegaly with coronary artery calcification   4. Vertebral body fracture of L1 is age indeterminate. XR CHEST PORTABLE   Final Result   1. Cardiomegaly and aortic tortuosity   2. Minimal hazy density right lung base, differential radiolucency of the lungs is secondary to patient rotation   3.  Hypertrophic osteophytes of the shoulders, left greater than right              Assessment/Plan:    Active Hospital Problems    Diagnosis     Aspiration into airway [T17.908A]      Priority: Medium  Acute aspiration pneumonia ([address] [J69.0]      Priority: Medium    Acute respiratory failure with hypoxia (HCC) [J96.01]      Priority: Medium    COVID-19 viremia - Tested positive 06/09/2022 [U07.1]      Priority: Medium    Generalized weakness [R53.1]      Priority: Medium     Pneumonia, due to aspiration - Finished 7 days of Unasyn (6/16-6/23) followed by Augmentin (6/23-6-28)  - Pt tested positive for the COVID-19 virus in 06/09/2022, and dos not appear to have COVID pneumonia  - Respiratory culture ordered, no result  - Legionella pneumophilia and Strep pneumoniae urine antigens ordered but never obtained.     COVID-19 viremia - Tested positive 06/09/2022 - Provide symptomatic treatment     Acute respiratory failure with hypoxia (HCC) - due to aspiration pneumonia, not COVID. As evidenced by an oxygen saturation of less than 90% on room air. We will provide oxygen as needed to maintain an oxygen saturation of 92% or higher.     Aspiration   -Speech evaluated. -Recommend dysphagia minced&Moist solids, moderately Thick  MBS shows multilevel aspiration     Generalized weakness   - PT/OT to evaluate and treat patient, and if necessary to provide recommendations for post hospital therapy     Acute metabolic encephalopathy -we will provide supportive care    MARY vs CKD stage III-improving  -Cr 2.45 on 6/11  -Cr 1.5 today (6/27)  -Nephrology following    HTN-controlled inpatient without medication  -continue to hold home Toprol for now    DVT Prophylaxis: Lovenox  Diet: ADULT DIET;  Dysphagia - Minced and Moist; Moderately Thick (Honey)  ADULT ORAL NUTRITION SUPPLEMENT; Breakfast, Lunch, Dinner; Frozen Oral Supplement  Code Status: Full Code    PT/OT Eval Status: ordered    Dispo/plan-patient ready for discharge pending placement    Marquez Faustin MD

## 2022-06-28 LAB
ANION GAP SERPL CALCULATED.3IONS-SCNC: 11 MMOL/L (ref 3–16)
BASOPHILS ABSOLUTE: 0 K/UL (ref 0–0.2)
BASOPHILS RELATIVE PERCENT: 0.4 %
BUN BLDV-MCNC: 17 MG/DL (ref 7–20)
CALCIUM SERPL-MCNC: 8.2 MG/DL (ref 8.3–10.6)
CHLORIDE BLD-SCNC: 108 MMOL/L (ref 99–110)
CO2: 23 MMOL/L (ref 21–32)
CREAT SERPL-MCNC: 1.5 MG/DL (ref 0.8–1.3)
EOSINOPHILS ABSOLUTE: 0.1 K/UL (ref 0–0.6)
EOSINOPHILS RELATIVE PERCENT: 1.9 %
GFR AFRICAN AMERICAN: 54
GFR NON-AFRICAN AMERICAN: 44
GLUCOSE BLD-MCNC: 96 MG/DL (ref 70–99)
HCT VFR BLD CALC: 32.4 % (ref 40.5–52.5)
HEMOGLOBIN: 10.6 G/DL (ref 13.5–17.5)
LYMPHOCYTES ABSOLUTE: 0.5 K/UL (ref 1–5.1)
LYMPHOCYTES RELATIVE PERCENT: 6.7 %
MCH RBC QN AUTO: 28.7 PG (ref 26–34)
MCHC RBC AUTO-ENTMCNC: 32.8 G/DL (ref 31–36)
MCV RBC AUTO: 87.3 FL (ref 80–100)
MONOCYTES ABSOLUTE: 0.4 K/UL (ref 0–1.3)
MONOCYTES RELATIVE PERCENT: 5.8 %
NEUTROPHILS ABSOLUTE: 6.1 K/UL (ref 1.7–7.7)
NEUTROPHILS RELATIVE PERCENT: 85.2 %
PDW BLD-RTO: 15.7 % (ref 12.4–15.4)
PLATELET # BLD: 306 K/UL (ref 135–450)
PMV BLD AUTO: 7.6 FL (ref 5–10.5)
POTASSIUM REFLEX MAGNESIUM: 3.9 MMOL/L (ref 3.5–5.1)
RBC # BLD: 3.72 M/UL (ref 4.2–5.9)
SODIUM BLD-SCNC: 142 MMOL/L (ref 136–145)
WBC # BLD: 7.2 K/UL (ref 4–11)

## 2022-06-28 PROCEDURE — 36415 COLL VENOUS BLD VENIPUNCTURE: CPT

## 2022-06-28 PROCEDURE — 6360000002 HC RX W HCPCS: Performed by: INTERNAL MEDICINE

## 2022-06-28 PROCEDURE — 6370000000 HC RX 637 (ALT 250 FOR IP): Performed by: INTERNAL MEDICINE

## 2022-06-28 PROCEDURE — 2580000003 HC RX 258: Performed by: INTERNAL MEDICINE

## 2022-06-28 PROCEDURE — 80048 BASIC METABOLIC PNL TOTAL CA: CPT

## 2022-06-28 PROCEDURE — C9113 INJ PANTOPRAZOLE SODIUM, VIA: HCPCS | Performed by: INTERNAL MEDICINE

## 2022-06-28 PROCEDURE — 2700000000 HC OXYGEN THERAPY PER DAY

## 2022-06-28 PROCEDURE — 94761 N-INVAS EAR/PLS OXIMETRY MLT: CPT

## 2022-06-28 PROCEDURE — 85025 COMPLETE CBC W/AUTO DIFF WBC: CPT

## 2022-06-28 PROCEDURE — 97530 THERAPEUTIC ACTIVITIES: CPT

## 2022-06-28 PROCEDURE — 97110 THERAPEUTIC EXERCISES: CPT

## 2022-06-28 PROCEDURE — 1200000000 HC SEMI PRIVATE

## 2022-06-28 RX ORDER — PANTOPRAZOLE SODIUM 40 MG/1
40 TABLET, DELAYED RELEASE ORAL
Status: DISCONTINUED | OUTPATIENT
Start: 2022-06-28 | End: 2022-07-05 | Stop reason: HOSPADM

## 2022-06-28 RX ADMIN — PANTOPRAZOLE SODIUM 40 MG: 40 INJECTION, POWDER, LYOPHILIZED, FOR SOLUTION INTRAVENOUS at 08:41

## 2022-06-28 RX ADMIN — ENOXAPARIN SODIUM 40 MG: 100 INJECTION SUBCUTANEOUS at 08:42

## 2022-06-28 RX ADMIN — ACETAMINOPHEN 650 MG: 325 TABLET ORAL at 14:32

## 2022-06-28 RX ADMIN — Medication 5 MG: at 20:26

## 2022-06-28 RX ADMIN — SODIUM CHLORIDE, PRESERVATIVE FREE 10 ML: 5 INJECTION INTRAVENOUS at 08:42

## 2022-06-28 RX ADMIN — PANTOPRAZOLE SODIUM 40 MG: 40 TABLET, DELAYED RELEASE ORAL at 16:18

## 2022-06-28 RX ADMIN — SODIUM CHLORIDE, PRESERVATIVE FREE 10 ML: 5 INJECTION INTRAVENOUS at 20:26

## 2022-06-28 ASSESSMENT — PAIN SCALES - GENERAL: PAINLEVEL_OUTOF10: 0

## 2022-06-28 NOTE — CARE COORDINATION
CM spoke with Sonali Wang at [facility] [phone] who confirmed submitting updated PT/OT notes, and will call CM when precert is approved.         Electronically signed by EDUARD Cardozo, JAKOBW on 6/28/2022 at 10:45 AM

## 2022-06-28 NOTE — PROGRESS NOTES
Physical Therapy  Facility/Department: [address]  Daily Treatment Note  NAME: Anna Solis  : 1935  MRN: [id number]    Date of Service: 2022    Discharge Recommendations:  Anna Solis scored a 7/24 on the AM-PAC short mobility form. Current research shows that an AM-PAC score of 17 or less is typically not associated with a discharge to the patient's home setting. Based on the patient's AM-PAC score and their current functional mobility deficits, it is recommended that the patient have 3-5 sessions per week of Physical Therapy at d/c to increase the patient's independence. Please see assessment section for further patient specific details. If patient discharges prior to next session this note will serve as a discharge summary. Please see below for the latest assessment towards goals. Subacute/Skilled Nursing Facility   PT Equipment Recommendations  Equipment Needed: No (defer to next level of care)    Patient Diagnosis(es): The primary encounter diagnosis was Aspiration into airway, initial encounter. A diagnosis of Dysphagia, unspecified type was also pertinent to this visit. Assessment   Assessment: Pt demonstrates decreased tolerance for PT session this date with consistant reports of pain \"all over\" his body. Pt demonstrates decreased tolerance to sitting EOB and requires increased assistance to maintain sitting balance. Pt requires consistent VC throughout session from PT and RN to keep his eyes open. Pt would benefit from continued skilled PT throughout inpatient stay. Recommend continued skilled PT services upon discharge. Activity Tolerance: Patient limited by pain; Patient limited by endurance; Patient limited by fatigue;Treatment limited secondary to medical complications  Equipment Needed: No (defer to next level of care)     Plan    Plan  Plan:  ((2-5))  Current Treatment Recommendations: Strengthening;ROM; Functional mobility training;Transfer training;Patient/Caregiver education & training;Pain management;Gait training;Stair training; Endurance training     Restrictions  Position Activity Restriction  Other position/activity restrictions: up with assist     Subjective    Subjective  Subjective: Pt supine in bed with 2 family members and RN present. Pt agreeable to PT session and RN cleared. Pain: Pt reports pain \"all over\" throughout session but is unable to rate pain. RN present and administered pain medication  Orientation  Orientation Level: Oriented to person     Objective      Bed Mobility Training  Bed Mobility Training: Yes  Overall Level of Assistance: Assist X2;Maximum assistance  Rolling: Maximum assistance  Supine to Sit: Assist X2;Maximum assistance  Sit to Supine: Maximum assistance;Assist X2  Scooting: Assist X2;Maximum assistance  Balance  Sitting: Impaired  Sitting - Static: Poor (constant support); Occasional (Pt requires Max A to Min A x 1 to maintain sitting balance. Pt only able to sit EOB x 2 minutes due to pain and fatigue.)  Sitting - Dynamic: Not tested  Transfer Training  Transfer Training: No  Gait Training  Gait Training: No  Wheelchair Management  Wheelchair Management: No     PT Exercises  A/AROM Exercises: x 15 bilateral ankle pumps, x 15 bilateral knee flex/ ext, x 15 bilateral SLR, x 15 bilateral hip abduction, x 15 bilateral elbow flexion     Safety Devices  Type of Devices: Left in bed;Call light within reach; Bed alarm in place;Nurse notified; All fall risk precautions in place; Patient at risk for falls  Restraints  Restraints Initially in Place: No       Goals  Short Term Goals  Time Frame for Short term goals: discharge  ongoing 6/27  Short term goal 1: Rolling Left Mod A, on going  Short term goal 2: Supine to sit Mod A, on going  Short term goal 3: Sitting balance for 8 minutes with no LOB, on going  Patient Goals   Patient goals : Not stated    Education  Patient Education  Education Given To: Patient  Education Provided: Role of Therapy;Plan of Care  Education Method: Verbal  Barriers to Learning: Cognition  Education Outcome: Verbalized understanding    Therapy Time   Individual Concurrent Group Co-treatment   Time In 1410         Time Out 1435         Minutes 25         Timed Code Treatment Minutes: [id number] [address], [place], DPT PT [id number]  Dequan Carlton, PT

## 2022-06-28 NOTE — PROGRESS NOTES
Hospitalist Progress Note      PCP: Keenan Ferrer MD    Date of Admission: 6/16/2022    Chief Complaint: Aspiration Pneumonia    Hospital Course: H&P    Subjective: And examined at bedside. Patient alert, awake. Doing well. Waiting for placement. Per staff patient tolerating minced and moist diet however needing one-to-one feeding assistance      Medications:  Reviewed    Infusion Medications    sodium chloride 25 mL (06/23/22 0528)     Scheduled Medications    pantoprazole  40 mg IntraVENous BID    melatonin  5 mg Oral Nightly    sodium chloride flush  10 mL IntraVENous 2 times per day    enoxaparin  40 mg SubCUTAneous Daily     PRN Meds: potassium chloride **OR** potassium alternative oral replacement **OR** potassium chloride, magnesium sulfate, sodium chloride flush, sodium chloride, ondansetron, polyethylene glycol, acetaminophen **OR** acetaminophen      Intake/Output Summary (Last 24 hours) at 6/28/2022 1545  Last data filed at 6/28/2022 1243  Gross per 24 hour   Intake 120 ml   Output 600 ml   Net -480 ml       Physical Exam Performed:    /85   Pulse 80   Temp 97.6 °F (36.4 °C) (Oral)   Resp 18   Ht 5' 10\" (1.778 m)   Wt 156 lb (70.8 kg)   SpO2 98%   BMI 22.38 kg/m²     General appearance: NAD, lying in bed  HEENT: Pupils equal, round  Neck: Supple, with full range of motion. No jugular venous distention. Respiratory:  Normal respiratory effort. Clear to auscultation, bilaterally without Rales/Wheezes/Rhonchi. Cardiovascular: Regular rate and rhythm with normal S1/S2 without murmurs, rubs or gallops. Abdomen: Soft, non-tender, non-distended with normal bowel sounds. Musculoskeletal: No clubbing, cyanosis or edema bilaterally. Full range of motion without deformity. Skin: Skin color, texture, turgor normal.  No rashes or lesions. Neurologic:  Neurovascularly intact without any focal sensory/motor deficits.  Cranial nerves: II-XII intact, grossly non-focal.  Psychiatric: Alert and awake  Capillary Refill: Brisk,3 seconds, normal   Peripheral Pulses: +2 palpable, equal bilaterally       Labs:   Recent Labs     06/26/22  0442 06/27/22  0700 06/28/22  0933   WBC 8.3 7.2 7.2   HGB 11.2* 10.9* 10.6*   HCT 33.3* 33.6* 32.4*    251 306     Recent Labs     06/26/22  0442 06/27/22  0700 06/28/22  0933    138 142   K 3.7 4.1 3.9    107 108   CO2 19* 19* 23   BUN 17 16 17   CREATININE 1.5* 1.5* 1.5*   CALCIUM 7.9* 7.6* 8.2*   PHOS 3.2  --   --      No results for input(s): AST, ALT, BILIDIR, BILITOT, ALKPHOS in the last 72 hours. No results for input(s): INR in the last 72 hours. No results for input(s): Aky Risk in the last 72 hours. Urinalysis:      Lab Results   Component Value Date    NITRU Negative 06/20/2022    WBCUA 0-2 06/20/2022    RBCUA  06/20/2022    BLOODU LARGE 06/20/2022    SPECGRAV 1.025 06/20/2022    GLUCOSEU Negative 06/20/2022       Radiology:  FL MODIFIED BARIUM SWALLOW W VIDEO   Final Result      1. Multilevel episodes of tracheal aspiration as detailed above. CT CHEST WO CONTRAST   Final Result   1. Right lower lobe bronchovascular crowding and airspace disease, small pneumonia with adjacent pleural thickening   2. Large hiatal hernia   3. Cardiomegaly with coronary artery calcification   4. Vertebral body fracture of L1 is age indeterminate. XR CHEST PORTABLE   Final Result   1. Cardiomegaly and aortic tortuosity   2. Minimal hazy density right lung base, differential radiolucency of the lungs is secondary to patient rotation   3. Hypertrophic osteophytes of the shoulders, left greater than right              Assessment/Plan:      Pneumonia, due to aspiration   - CT: Right lower lobe pneumonia  - Pxt has a large Hiatal hernia, and dementia, all which predispose him to recurrent aspirations. - Pt tested positive for the COVID-19 virus in 06/09/2022, and does not appear to have COVID pneumonia. Tested negative 6/18/2022  - Respiratory culture ordered, no result; Legionella pneumophilia and Strep pneumoniae urine antigens ordered but never obtained. - Seen by SLP. MBS showed multilevel aspiration  - Finished 7 days of Unasyn (6/16-6/23) followed by Augmentin (6/23-6-28)  - Can prob be weaned off O2 (% on 2L)  - Dysphagia diet;  Minced & Moist, with moderately Thick liquids  - Aspiration precautions       Acute respiratory failure with hypoxia  Sec toaspiration pneumonia  - O2 sat was less than 90% on room air.    - Completed antibx for pneumonia  - Can prob be weaned off O2 (% on 2L)       Generalized weakness   Dementia  - Update B12, level; TSH  - Supportive care      Acute metabolic encephalopathy   - appears resolved, back to baseline  - Supportive care      MARY vs CKD stage III-improving  -Nephrology following      HTN-controlled inpatient without medication  - Continue to hold home Toprol for now      DVT Prophylaxis: Lovenox  Diet: ADULT DIET;  Dysphagia - Minced and Moist; Moderately Thick (Honey)  ADULT ORAL NUTRITION SUPPLEMENT; Breakfast, Lunch, Dinner; Frozen Oral Supplement  Code Status: Full Code    PT/OT Eval Status: ordered    Dispo/plan:  - Patient ready for discharge pending placement/precert    Marya Chacon MD

## 2022-06-28 NOTE — PROGRESS NOTES
6/20/2022    EGD BIOPSY performed by Violet Calvert MD at [place] [address]        reports that he has never smoked. He has never used smokeless tobacco. He reports previous alcohol use. He reports that he does not use drugs. family history is not on file. Medication:     Current Facility-Administered Medications: potassium chloride (KLOR-CON M) extended release tablet 40 mEq, 40 mEq, Oral, PRN **OR** potassium bicarb-citric acid (EFFER-K) effervescent tablet 40 mEq, 40 mEq, Oral, PRN **OR** potassium chloride 10 mEq/100 mL IVPB (Peripheral Line), 10 mEq, IntraVENous, PRN  magnesium sulfate 1000 mg in dextrose 5% 100 mL IVPB, 1,000 mg, IntraVENous, PRN  pantoprazole (PROTONIX) injection 40 mg, 40 mg, IntraVENous, BID  melatonin disintegrating tablet 5 mg, 5 mg, Oral, Nightly  sodium chloride flush 0.9 % injection 10 mL, 10 mL, IntraVENous, 2 times per day  sodium chloride flush 0.9 % injection 10 mL, 10 mL, IntraVENous, PRN  0.9 % sodium chloride infusion, , IntraVENous, PRN  enoxaparin (LOVENOX) injection 40 mg, 40 mg, SubCUTAneous, Daily  ondansetron (ZOFRAN) injection 4 mg, 4 mg, IntraVENous, Q4H PRN  polyethylene glycol (GLYCOLAX) packet 17 g, 17 g, Oral, Daily PRN  acetaminophen (TYLENOL) tablet 650 mg, 650 mg, Oral, Q4H PRN **OR** acetaminophen (TYLENOL) suppository 650 mg, 650 mg, Rectal, Q4H PRN       Vitals :     Vitals:    06/28/22 0836   BP: 119/85   Pulse: 80   Resp: 18   Temp: 97.6 °F (36.4 °C)   SpO2: 100%       I & O :       Intake/Output Summary (Last 24 hours) at 6/28/2022 8890  Last data filed at 6/28/2022 0503  Gross per 24 hour   Intake 360 ml   Output 1100 ml   Net -740 ml        Physical Examination :     Minimal exam was performed to minimize exposure to COVID-19. Patient was seen and evaluated from the door.   Preserving PPE       LABS:     Recent Labs     06/26/22  0442 06/27/22  0700   WBC 8.3 7.2   HGB 11.2* 10.9*   HCT 33.3* 33.6*    251     Recent Labs

## 2022-06-28 NOTE — PLAN OF CARE
Problem: Respiratory - Adult  Goal: Achieves optimal ventilation and oxygenation  Outcome: Progressing  Flowsheets (Taken 6/28/2022 0645)  Achieves optimal ventilation and oxygenation:   Assess for changes in respiratory status   Assess for changes in mentation and behavior   Position to facilitate oxygenation and minimize respiratory effort  Note: Pt Spo2 >92% on 2 L NC, which is home dose. Breathing unlabored at rest.     Problem: Pain  Goal: Verbalizes/displays adequate comfort level or baseline comfort level  Outcome: Progressing  Flowsheets (Taken 6/28/2022 0645)  Verbalizes/displays adequate comfort level or baseline comfort level:   Encourage patient to monitor pain and request assistance   Assess pain using appropriate pain scale   Administer analgesics based on type and severity of pain and evaluate response  Note: Pt c/o leg pain, PRN tylenol given with relief. Problem: Safety - Adult  Goal: Free from fall injury  Outcome: Progressing     Problem: Skin/Tissue Integrity  Goal: Absence of new skin breakdown  Description: 1. Monitor for areas of redness and/or skin breakdown  2. Assess vascular access sites hourly  3. Every 4-6 hours minimum:  Change oxygen saturation probe site  4. Every 4-6 hours:  If on nasal continuous positive airway pressure, respiratory therapy assess nares and determine need for appliance change or resting period. Outcome: Progressing  Note: Pt repositioned and checked for incontinence frequently during shift.

## 2022-06-29 LAB
ANION GAP SERPL CALCULATED.3IONS-SCNC: 11 MMOL/L (ref 3–16)
BASOPHILS ABSOLUTE: 0.1 K/UL (ref 0–0.2)
BASOPHILS RELATIVE PERCENT: 0.7 %
BUN BLDV-MCNC: 18 MG/DL (ref 7–20)
CALCIUM SERPL-MCNC: 8.1 MG/DL (ref 8.3–10.6)
CHLORIDE BLD-SCNC: 109 MMOL/L (ref 99–110)
CO2: 22 MMOL/L (ref 21–32)
CREAT SERPL-MCNC: 1.6 MG/DL (ref 0.8–1.3)
EOSINOPHILS ABSOLUTE: 0.1 K/UL (ref 0–0.6)
EOSINOPHILS RELATIVE PERCENT: 1 %
GFR AFRICAN AMERICAN: 50
GFR NON-AFRICAN AMERICAN: 41
GLUCOSE BLD-MCNC: 95 MG/DL (ref 70–99)
HCT VFR BLD CALC: 31.2 % (ref 40.5–52.5)
HEMOGLOBIN: 10.6 G/DL (ref 13.5–17.5)
LYMPHOCYTES ABSOLUTE: 0.4 K/UL (ref 1–5.1)
LYMPHOCYTES RELATIVE PERCENT: 5.1 %
MCH RBC QN AUTO: 29.1 PG (ref 26–34)
MCHC RBC AUTO-ENTMCNC: 33.8 G/DL (ref 31–36)
MCV RBC AUTO: 86.2 FL (ref 80–100)
MONOCYTES ABSOLUTE: 0.5 K/UL (ref 0–1.3)
MONOCYTES RELATIVE PERCENT: 6 %
NEUTROPHILS ABSOLUTE: 7 K/UL (ref 1.7–7.7)
NEUTROPHILS RELATIVE PERCENT: 87.2 %
PDW BLD-RTO: 15.9 % (ref 12.4–15.4)
PLATELET # BLD: 309 K/UL (ref 135–450)
PMV BLD AUTO: 7.6 FL (ref 5–10.5)
POTASSIUM REFLEX MAGNESIUM: 3.7 MMOL/L (ref 3.5–5.1)
RBC # BLD: 3.63 M/UL (ref 4.2–5.9)
SODIUM BLD-SCNC: 142 MMOL/L (ref 136–145)
VITAMIN B-12: 494 PG/ML (ref 211–911)
WBC # BLD: 8.1 K/UL (ref 4–11)

## 2022-06-29 PROCEDURE — 6360000002 HC RX W HCPCS: Performed by: INTERNAL MEDICINE

## 2022-06-29 PROCEDURE — 85025 COMPLETE CBC W/AUTO DIFF WBC: CPT

## 2022-06-29 PROCEDURE — 6370000000 HC RX 637 (ALT 250 FOR IP): Performed by: INTERNAL MEDICINE

## 2022-06-29 PROCEDURE — 2580000003 HC RX 258: Performed by: INTERNAL MEDICINE

## 2022-06-29 PROCEDURE — 82607 VITAMIN B-12: CPT

## 2022-06-29 PROCEDURE — 97110 THERAPEUTIC EXERCISES: CPT

## 2022-06-29 PROCEDURE — 80048 BASIC METABOLIC PNL TOTAL CA: CPT

## 2022-06-29 PROCEDURE — 97530 THERAPEUTIC ACTIVITIES: CPT

## 2022-06-29 PROCEDURE — 1200000000 HC SEMI PRIVATE

## 2022-06-29 PROCEDURE — 36415 COLL VENOUS BLD VENIPUNCTURE: CPT

## 2022-06-29 RX ORDER — PANTOPRAZOLE SODIUM 40 MG/1
TABLET, DELAYED RELEASE ORAL
Qty: 28 TABLET | Refills: 1
Start: 2022-06-29

## 2022-06-29 RX ORDER — HYDROCODONE BITARTRATE AND ACETAMINOPHEN 5; 325 MG/1; MG/1
1 TABLET ORAL EVERY 6 HOURS PRN
Status: DISCONTINUED | OUTPATIENT
Start: 2022-06-29 | End: 2022-07-05 | Stop reason: HOSPADM

## 2022-06-29 RX ADMIN — ENOXAPARIN SODIUM 40 MG: 100 INJECTION SUBCUTANEOUS at 12:57

## 2022-06-29 RX ADMIN — PANTOPRAZOLE SODIUM 40 MG: 40 TABLET, DELAYED RELEASE ORAL at 05:06

## 2022-06-29 RX ADMIN — PANTOPRAZOLE SODIUM 40 MG: 40 TABLET, DELAYED RELEASE ORAL at 17:28

## 2022-06-29 RX ADMIN — ACETAMINOPHEN 650 MG: 325 TABLET ORAL at 17:27

## 2022-06-29 RX ADMIN — HYDROCODONE BITARTRATE AND ACETAMINOPHEN 1 TABLET: 5; 325 TABLET ORAL at 05:06

## 2022-06-29 RX ADMIN — SODIUM CHLORIDE, PRESERVATIVE FREE 10 ML: 5 INJECTION INTRAVENOUS at 21:17

## 2022-06-29 RX ADMIN — Medication 5 MG: at 21:16

## 2022-06-29 RX ADMIN — SODIUM CHLORIDE, PRESERVATIVE FREE 10 ML: 5 INJECTION INTRAVENOUS at 13:06

## 2022-06-29 ASSESSMENT — PAIN SCALES - GENERAL: PAINLEVEL_OUTOF10: 3

## 2022-06-29 ASSESSMENT — PAIN DESCRIPTION - LOCATION
LOCATION: LEG
LOCATION: LEG

## 2022-06-29 ASSESSMENT — PAIN DESCRIPTION - DESCRIPTORS: DESCRIPTORS: CRAMPING

## 2022-06-29 ASSESSMENT — PAIN DESCRIPTION - ORIENTATION: ORIENTATION: RIGHT;LEFT;LOWER

## 2022-06-29 NOTE — CARE COORDINATION
NEO spoke with Miri Chiang at [place] [phone] to follow up on precert; still pending. She is looking into it with the insurance company will call NEO when precert is approved.         Electronically signed by EDUARD Banks, BRENDA on 6/29/2022 at 11:32 AM

## 2022-06-29 NOTE — PROGRESS NOTES
4 Eyes Admission Assessment     I agree as the admission nurse that 2 RN's have performed a thorough Head to Toe Skin Assessment on the patient. ALL assessment sites listed below have been assessed on admission. Areas assessed by both nurses:   [x]   Head, Face, and Ears   [x]   Shoulders, Back, and Chest  [x]   Arms, Elbows, and Hands   [x]   Coccyx, Sacrum, and Ischium  [x]   Legs, Feet, and Heels        Does the Patient have Skin Breakdown?   Scattered bruising   Blanchable redness R heel   Blanchable redness and excoriation carol area/buttocks          Pérez Prevention initiated:  yes   Wound Care Orders initiated:  no      WOC nurse consulted for Pressure Injury (Stage 3,4, Unstageable, DTI, NWPT, and Complex wounds) or Pérez score 18 or lower:  No     Nurse 1 eSignature: Electronically signed by Rock Cadena RN on 6/29/22 at 7:26 PM EDT    **SHARE this note so that the co-signing nurse is able to place an eSignature**    Nurse 2 eSignature: {Esignature:[id number]}

## 2022-06-29 NOTE — PROGRESS NOTES
Physical Therapy  Facility/Department: [address]  Daily Treatment Note  NAME: Demond Mcghee  : 1935  MRN: [id number]    Date of Service: 2022    Discharge Recommendations:  Demond Mcghee scored a 7/24 on the AM-PAC short mobility form. Current research shows that an AM-PAC score of 17 or less is typically not associated with a discharge to the patient's home setting. Based on the patient's AM-PAC score and their current functional mobility deficits, it is recommended that the patient have 3-5 sessions per week of Physical Therapy at d/c to increase the patient's independence. Please see assessment section for further patient specific details. If patient discharges prior to next session this note will serve as a discharge summary. Please see below for the latest assessment towards goals. Subacute/Skilled Nursing Facility   PT Equipment Recommendations  Equipment Needed: No    Patient Diagnosis(es): The primary encounter diagnosis was Aspiration into airway, initial encounter. A diagnosis of Dysphagia, unspecified type was also pertinent to this visit. Assessment   Assessment: Pt demosntrates increased intraction throughout treatment session this date. Pt demonstrates increased tolerance to sitting EOB with improved balance. Pt continues to require assist x 2 for bed moblity. Pt appears to have decreased pain this date as pt did not mention it or moan in pain during treatment. Pt would continue to benefit from continued skilled PT services throughout inpatient stay. Recommend sub acute rehabilitation upon discharge. Activity Tolerance: Patient limited by pain; Patient limited by endurance; Patient limited by fatigue;Treatment limited secondary to medical complications  Equipment Needed: No     Plan    Plan  Plan:  ((2-5))  Current Treatment Recommendations: Strengthening;ROM; Functional mobility training;Transfer training;Patient/Caregiver education & training;Pain management;Gait training;Stair training; Endurance training     Restrictions  Position Activity Restriction  Other position/activity restrictions: up with assist     Subjective    Subjective  Subjective: Pt supine in bed upon arrival. Pt agreeable to PT/OT treatment session. RN cleared for therapy. Pain: Pt has no reports of pain this date. Cognition  Overall Cognitive Status: WFL (Pt appearing to be WFL, slow w/ some responses d/t fatigue/lethargy)  Arousal/Alertness: Delayed responses to stimuli  Following Commands: Follows one step commands with increased time  Attention Span: Appears intact     Objective      Bed Mobility Training  Bed Mobility Training: Yes  Overall Level of Assistance: Maximum assistance;Minimum assistance;Assist X2  Interventions: Verbal cues  Rolling: Maximum assistance  Supine to Sit: Maximum assistance;Minimum assistance;Assist X2  Sit to Supine: Total assistance;Assist X2  Scooting: Maximum assistance  Balance  Sitting: Impaired  Sitting - Static: Occasional (Min A to CGA for sitting EOB)  Sitting - Dynamic: Occasional  Transfer Training  Transfer Training: No  Gait Training  Gait Training: No  Wheelchair Management  Wheelchair Management: No     PT Exercises  A/AROM Exercises: x 15 bilateral ankle pumps, x 15 bilateral knee flex/ ext, x 15 bilateral SLR, x 15 bilateral hip abduction  Dynamic Sitting Balance Exercises: sitting EOB x 20 minutes total, B UE reaches x 15 total (contralateral, ipsilateral, high and low)  Other Specialty Interventions  Other Treatments/Modalities: B LE edema massage (L lower leg and R just proximal to knee), oral and facial hygeine  Safety Devices  Type of Devices: Left in bed;Call light within reach; Bed alarm in place;Nurse notified; All fall risk precautions in place; Patient at risk for falls  Restraints  Restraints Initially in Place: No       Goals  Short Term Goals  Time Frame for Short term goals: discharge  ongoing 6/27  Short term goal 1: Rolling Left Mod A, on going  Short term goal 2: Supine to sit Mod A, on going  Short term goal 3: Sitting balance for 8 minutes with no LOB, on going  Patient Goals   Patient goals : Not stated    Education  Patient Education  Education Given To: Patient  Education Provided: Role of Therapy;Plan of Care  Education Method: Verbal  Barriers to Learning: Cognition  Education Outcome: Verbalized understanding    Therapy Time   Individual Concurrent Group Co-treatment   Time In 1110         Time Out 1203         Minutes 53         Timed Code Treatment Minutes: [address] [place] [address], [place], [place] [id number]  Render Ly, PT

## 2022-06-29 NOTE — DISCHARGE INSTR - COC
Continuity of Care Form    Patient Name: Benito Peck   :  1935  MRN:  [id number]    Admit date:  2022  Discharge date:  2022    Code Status Order: Full Code   Advance Directives:   [address] [facility] Documentation       Date/Time Healthcare Directive Type of Healthcare Directive Copy in [address] Agent's Name Healthcare Agent's Phone Number    22 1619 No, patient does not have an advance directive for healthcare treatment -- -- -- -- --            Admitting Physician:  Elba Akhtar MD  PCP: Rock Santana MD    Discharging Nurse: [facility] Unit/Room#: 6183/3045-63  Discharging Unit Phone Number: [phone]    Emergency Contact:   Extended Emergency Contact Information  Primary Emergency Contact: Billie Díaz  Mobile Phone: [phone]  Relation: Child  Secondary Emergency Contact: Angella Stern  Mobile Phone: [phone]  Relation: Child    Past Surgical History:  Past Surgical History:   Procedure Laterality Date    UPPER GASTROINTESTINAL ENDOSCOPY N/A 2022    EGD BIOPSY performed by Laverne Mcfarlane MD at [address] ENDOSCOPY       Immunization History: There is no immunization history on file for this patient.     Active Problems:  Patient Active Problem List   Diagnosis Code    Acute aspiration pneumonia ([facility] [address] J69.0    Acute respiratory failure with hypoxia (HCC) J96.01    COVID-19 viremia - Tested positive 2022 U07.1    Generalized weakness R53.1    Aspiration into airway T17.908A       Isolation/Infection:   Isolation            Droplet Plus          Patient Infection Status       Infection Onset Added Last Indicated Last Indicated By Review Planned Expiration Resolved Resolved By    COVID-19 06/10/22 06/20/22 06/10/22 COVID-19 22      Resolved    C-diff Rule Out 22 Clostridium difficile toxin/antigen (Ordered)   22 Rule-Out Test Resulted Nurse Assessment:  Last Vital Signs: /81   Pulse 83   Temp 97.4 °F (36.3 °C) (Oral)   Resp 17   Ht 5' 10\" (1.778 m)   Wt 156 lb (70.8 kg)   SpO2 99%   BMI 22.38 kg/m²     Last documented pain score (0-10 scale): Pain Level: 0  Last Weight:   Wt Readings from Last 1 Encounters:   06/16/22 156 lb (70.8 kg)     Mental Status:  disoriented, oriented, alert, coherent, logical, thought processes intact, and able to concentrate and follow conversation    IV Access:  - None    Nursing Mobility/ADLs:  Walking   Dependent  Transfer  [facility]  Assisted  Med Delivery   crushed    Wound Care Documentation and Therapy:        Elimination:  Continence: Bowel: No  Bladder: No  Urinary Catheter: Last Change Date 07/02    Colostomy/Ileostomy/Ileal Conduit: No       Date of Last BM: 07/04    Intake/Output Summary (Last 24 hours) at 6/29/2022 1028  Last data filed at 6/29/2022 0409  Gross per 24 hour   Intake 360 ml   Output 525 ml   Net -165 ml     I/O last 3 completed shifts: In: 360 [P.O.:360]  Out: [address] [Urine:1125]    Safety Concerns: At Risk for Falls    Impairments/Disabilities:      None    Nutrition Therapy:  Current Nutrition Therapy:   - Oral Diet:  Dysphagia 1 pureed    Routes of Feeding: Oral  Liquids: Thin Liquids  Daily Fluid Restriction: no  Last Modified Barium Swallow with Video (Video Swallowing Test): not done    Treatments at the Time of Hospital Discharge:   Respiratory Treatments: None  Oxygen Therapy:  is not on home oxygen therapy.   Ventilator:    - No ventilator support    Rehab Therapies: Physical Therapy and Occupational Therapy  Weight Bearing Status/Restrictions: No weight bearing restrictions  Other Medical Equipment (for information only, NOT a DME order):  Bedbound  Other Treatments: N/A    Patient's personal belongings (please select all that are sent with patient):  Carlos LUTHER SIGNATURE:  Electronically signed by Ayanna Loera RN on 7/5/22 at 3:15 PM EDT    CASE MANAGEMENT/SOCIAL WORK SECTION    Inpatient Status Date: 6/16/22    Readmission Risk Assessment Score:  Readmission Risk              Risk of Unplanned Readmission:  13           Discharging to Facility/ [address] [place] [address] Details  Fax          [address] [facility], [address] [place] [address]       Phone: [phone]       Fax: [phone]          / signature: Electronically signed by Ervin Krause RN on 7/5/22 at 12:48 PM EDT    PHYSICIAN SECTION    Prognosis: Fair    Condition at Discharge: Stable    Rehab Potential (if transferring to Rehab): Fair    Recommended Labs or Other Treatments After Discharge:     CBC and BMP in 5-7 days    Diet Recommendations  1. Solid Texture: 5 minced and moist  2. Liquid Consistency: Mildly thick liquids  3. Medications: crushed in puree  4. Swallow precautions  Fully upright and alert when eating/drinking  Supervision with meals, snacks, and medications  Eat/drink slowly  Small bites/sips  Take a drink after 1-2 bites/sips  5. Oral care with toothbrush/toothpaste 2x/day to reduce colonization of oral bacteria and mitigate risk of aspiration pneumonia   6. Skilled SLP services to follow for dysphagia       Please follow up with Dr. Getachew Johnson, surgeon  For your large Hiatal hernia, after acute issues resolve. Please call his office to make an appointment. [phone]. Physician Certification: I certify the above information and transfer of Tori Hernandez  is necessary for the continuing treatment of the diagnosis listed and that he requires formerly [facility] for greater 30 days. Update Admission H&P: No change in H&P.  See DC summary    PHYSICIAN SIGNATURE:  Electronically signed by Candida Isaacs MD on 6/29/22 at 10:29 AM EDT

## 2022-06-29 NOTE — PROGRESS NOTES
Hospitalist Progress Note      PCP: Willie Bobby MD    Date of Admission: 6/16/2022    Chief Complaint: Aspiration Pneumonia    Hospital Course: H&P    Subjective:   Goldy Fothergill and examined at bedside. Patient alert, awake. Per staff patient tolerating minced and moist diet however needing one-to-one feeding assistance    Waiting for placement. Medications:  Reviewed    Infusion Medications    sodium chloride 25 mL (06/23/22 0528)     Scheduled Medications    pantoprazole  40 mg Oral BID AC    melatonin  5 mg Oral Nightly    sodium chloride flush  10 mL IntraVENous 2 times per day    enoxaparin  40 mg SubCUTAneous Daily     PRN Meds: HYDROcodone 5 mg - acetaminophen, potassium chloride **OR** potassium alternative oral replacement **OR** potassium chloride, magnesium sulfate, sodium chloride flush, sodium chloride, ondansetron, polyethylene glycol, acetaminophen **OR** acetaminophen      Intake/Output Summary (Last 24 hours) at 6/29/2022 1007  Last data filed at 6/29/2022 0409  Gross per 24 hour   Intake 360 ml   Output 525 ml   Net -165 ml       Physical Exam Performed:    /81   Pulse 83   Temp 97.4 °F (36.3 °C) (Oral)   Resp 17   Ht 5' 10\" (1.778 m)   Wt 156 lb (70.8 kg)   SpO2 99%   BMI 22.38 kg/m²     General appearance: NAD, lying in bed  HEENT: Pupils equal, round. No JVD  Neck: Supple, with full range of motion. No jugular venous distention. Respiratory:  Normal respiratory effort. Clear to auscultation, bilaterally without Rales/Wheezes/Rhonchi. Cardiovascular: Regular rate and rhythm with normal S1/S2 without murmurs, rubs or gallops. Abdomen: Soft, non-tender, non-distended with normal bowel sounds. Musculoskeletal: No clubbing, cyanosis or edema bilaterally. Full range of motion without deformity. Skin: Skin color, texture, turgor normal.  No rashes or lesions. Neurologic:  [place].   grossly non-focal.  Psychiatric: Alert and awake  Capillary Refill: Brisk,3 seconds, normal   Peripheral Pulses: +2 palpable, equal bilaterally       Labs:   Recent Labs     06/27/22  0700 06/28/22  0933 06/29/22  0734   WBC 7.2 7.2 8.1   HGB 10.9* 10.6* 10.6*   HCT 33.6* 32.4* 31.2*    306 309     Recent Labs     06/27/22  0700 06/28/22  0933 06/29/22  0734    142 142   K 4.1 3.9 3.7    108 109   CO2 19* 23 22   BUN 16 17 18   CREATININE 1.5* 1.5* 1.6*   CALCIUM 7.6* 8.2* 8.1*     No results for input(s): AST, ALT, BILIDIR, BILITOT, ALKPHOS in the last 72 hours. No results for input(s): INR in the last 72 hours. No results for input(s): Chandrika Height in the last 72 hours. Urinalysis:      Lab Results   Component Value Date    NITRU Negative 06/20/2022    WBCUA 0-2 06/20/2022    RBCUA  06/20/2022    BLOODU LARGE 06/20/2022    SPECGRAV 1.025 06/20/2022    GLUCOSEU Negative 06/20/2022     Radiology:  FL MODIFIED BARIUM SWALLOW W VIDEO   Final Result      1. Multilevel episodes of tracheal aspiration as detailed above. CT CHEST WO CONTRAST   Final Result   1. Right lower lobe bronchovascular crowding and airspace disease, small pneumonia with adjacent pleural thickening   2. Large hiatal hernia   3. Cardiomegaly with coronary artery calcification   4. Vertebral body fracture of L1 is age indeterminate. XR CHEST PORTABLE   Final Result   1. Cardiomegaly and aortic tortuosity   2. Minimal hazy density right lung base, differential radiolucency of the lungs is secondary to patient rotation   3. Hypertrophic osteophytes of the shoulders, left greater than right              Assessment/Plan:        Pneumonia, due to aspiration   - CT: Right lower lobe pneumonia  - Pxt has a large Hiatal hernia, and dementia, all which predispose him to recurrent aspirations. - Pt tested positive for the COVID-19 virus in 06/09/2022, but did not appear to have been felt to have COVID pneumonia.  Tested negative 6/18/2022  - Seen by SLP.  MBS showed multilevel aspiration  - Finished 7 days of Unasyn (6/16-6/23) followed by Augmentin (6/23-6-28)  - Can prob be weaned off O2 (% on 2L)  - Dysphagia diet;  Minced & Moist, with moderately Thick liquids  - If having recurrent aspirations, may need Surgery eval. in view of his large hiatal hernia; although he may not appear to be an ideal candidate clarence as will likely re-aspirate in view of the size of his hernia, placing him at high risk of recurrent aspiration, respiratory failure. Was seen by Dr. Navid Rubin: O/p f/u in 2 weeks. - Aspiration precautions, SLP follow up  - Pall Med was consulted re goals of care/code status        Acute respiratory failure with hypoxia:  Sec to aspiration pneumonia  - O2 sat was less than 90% on room air.    - Completed antibx for pneumonia  - Can prob be weaned off O2 (% on 2L)        Esophageal ulcer: POA  - Seen by GI. Had EGD: esophageal ulcer and biopsies obtained. Circumferential Brito's to 25cms   Pathology: chronic inflammation and reactive changes. - No intestinal metaplasia, dysplasia, or malignancy identified. - BID PPI x 8 weeks; then daily        Generalized weakness   Dementia  - Update B12, level; TSH: ordered. Pending.  - Supportive care        Acute metabolic encephalopathy   - Appears resolved, back to baseline  - Supportive care        CKD stage IIIb  He presented with a creatinine of 1.7. He has baseline creatinine between 2.5-1.5 since at least 2019. MARY ruled out  -Nephrology following        HTN-controlled inpatient without medication  - Continue to hold home Toprol for now        DVT Prophylaxis: Lovenox  Diet: ADULT DIET;  Dysphagia - Minced and Moist; Moderately Thick (Honey)  ADULT ORAL NUTRITION SUPPLEMENT; Breakfast, Lunch, Dinner; Frozen Oral Supplement  Code Status: Full Code    PT/OT Eval Status: ordered    Dispo/plan:  - Patient ready for discharge pending placement/precert    Aurora Sanchez MD

## 2022-06-29 NOTE — DISCHARGE SUMMARY
Hospital Discharge Summary    Patient's PCP: Alexandro Womack MD  Admit Date: 6/16/2022   Discharge Date: 7/5/2022    Admitting Physician: Dr. Yousif Dickens MD  Discharge Physician: Dr. Tam Santoro MD     Consults: GI, nephrology and general surgery    HPI:  80y.o. year-old male who was admitted to [facility] 1 week ago for falls and increased weakness. He was found to have COVID-19 but had no symptoms other than increased weakness. While there he failed a swallow evaluation and was advised to follow a dysphagia diet with thickened liquids. He was discharged day prior to presentation here, to [facility] for physical therapy rehab. Apparently the recommended diet was not followed and he aspirated. He developed shortness of breath and an x-ray was done which was consistent with aspiration. On evaluation in the emergency room he was found to have a new oxygen requirement associated with aspiration pneumonia. He was admitted for further evaluation and treatment. Brief hospital course:  Given the concern of the patients presentation and the concern of the possible multi-factorial etiology contributing to patients symptomatology. Patient was admitted and evaluated and found to have:         Discharge Diagnoses    Pneumonia, due to aspiration   - CT: Right lower lobe pneumonia  - Pxt has a large Hiatal hernia, and dementia, all which predispose him to recurrent aspirations. - Pt tested positive for the COVID-19 virus in 06/09/2022, but did not appear to have been felt to have COVID pneumonia. Tested negative 6/18/2022  - Seen by SLP.   MBS showed multilevel aspiration  - Finished 7 days of Unasyn (6/16-6/23) followed by Augmentin (6/23-6-28)  - Can prob be weaned off O2 (% on 2L)  - Dysphagia diet;  Minced & Moist, with moderately Thick liquids  - If having recurrent aspirations, may need Surgery eval. in view of his large hiatal hernia; although he may not appear to be an ideal candidate clarence as will likely re-aspirate in view of the size of his hernia, placing him at high risk of recurrent aspiration, respiratory failure. Was seen by Dr. Festus Cardozo: O/p f/u in 2 weeks. - Aspiration precautions, SLP follow up  - Pall Med was consulted re goals of care/code status       Acute respiratory failure with hypoxia:  Sec to aspiration pneumonia  - O2 sat was less than 90% on room air.    - Completed antibx for pneumonia  - Can prob be weaned off O2 (% on 2L)        Esophageal ulcer: POA  - Seen by GI. Had EGD: esophageal ulcer and biopsies obtained. Circumferential Brito's to 25cms   Pathology: chronic inflammation and reactive changes. - No intestinal metaplasia, dysplasia, or malignancy identified. - BID PPI x 8 weeks; then daily        Generalized weakness   Dementia  - Supportive care        Acute metabolic encephalopathy   - Appears resolved, back to baseline  - Supportive care       CKD stage IIIb  He presented with a creatinine of 1.7. He has baseline creatinine between 2.5-1.5 since at least 2019. MARY ruled out  -Nephrology following        HTN-controlled inpatient without medication  - Continue to hold home Toprol for now             Physical Exam: /81   Pulse 83   Temp 97.4 °F (36.3 °C) (Oral)   Resp 17   Ht 5' 10\" (1.778 m)   Wt 156 lb (70.8 kg)   SpO2 99%   BMI 22.38 kg/m²     No results for input(s): POCGLU in the last 72 hours. General appearance: NAD, lying in bed  HEENT: Pupils equal, round  Neck: Supple, with full range of motion. No jugular venous distention. Respiratory:  Normal respiratory effort. Clear to auscultation, bilaterally without Rales/Wheezes/Rhonchi. Cardiovascular: Regular rate and rhythm with normal S1/S2 without murmurs, rubs or gallops. Abdomen: Soft, non-tender, non-distended with normal bowel sounds. Musculoskeletal: No clubbing, cyanosis or edema bilaterally. Full range of motion without deformity.   Skin: Skin color, texture, turgor normal.  No rashes or lesions. Neurologic: grossly non-focal.  Psychiatric: Alert and awake  Capillary Refill: Brisk,3 seconds, normal   Peripheral Pulses: +2 palpable, equal bilaterally       LABS:  Recent Labs     06/29/22  0734   WBC 8.1   HGB 10.6*         Recent Labs     06/29/22  0734      K 3.7      CO2 22   BUN 18   CREATININE 1.6*   GLUCOSE 95     No results for input(s): INR in the last 72 hours. Discharge Medications:  Discharge Medication List as of 7/5/2022  3:15 PM             Details   melatonin 5 MG TBDP disintegrating tablet Take 1 tablet by mouth nightly for 14 days, Disp-14 tablet, R-0Normal                Details   pantoprazole (PROTONIX) 40 MG tablet 1 BID for 8 weeks, then 1 daily thereafter, Disp-28 tablet, R-1NO PRINT                Details   acetaminophen (TYLENOL) 325 MG tablet Take 650 mg by mouth every 6 hours as needed for PainHistorical Med      calcium carbonate (TUMS) 500 MG chewable tablet Take 2 tablets by mouth every 6 hours as needed for HeartburnHistorical Med           Activity: activity as tolerated  Diet:  As directed  Wound Care: none needed    Disposition: SNF  Discharged Condition: Stable  Follow Up: Primary Care Physician in one week    Total time spent on discharge, finalizing medications, referrals and arranging outpatient follow up was more than 30 minutes    Thank you Dr. Celina Venegas MD for the opportunity to be involved in this patients care. If you have any questions or concerns please feel free to contact me at [phone].

## 2022-06-29 NOTE — PROGRESS NOTES
Occupational Therapy  Facility/Department: [address]  Daily Treatment Note  NAME: Daryl Harper  : 1935  MRN: [id number]    Date of Service: 2022    Discharge Recommendations: Daryl Harper scored a 10/24 on the AM-PAC ADL Inpatient form. Current research shows that an AM-PAC score of 17 or less is typically not associated with a discharge to the patient's home setting. Based on the patient's AM-PAC score and their current ADL deficits, it is recommended that the patient have 3-5 sessions per week of Occupational Therapy at d/c to increase the patient's independence. Please see assessment section for further patient specific details. If patient discharges prior to next session this note will serve as a discharge summary. Please see below for the latest assessment towards goals. OT Equipment Recommendations  Other: defer      Patient Diagnosis(es): The primary encounter diagnosis was Aspiration into airway, initial encounter. A diagnosis of Dysphagia, unspecified type was also pertinent to this visit. Assessment    Assessment: Pt tolerated session well and participated w/ session. Pt completed BUE therex in supported sit at EOB - very lethargic t/o sesion but participating. 2 person assist in bed mobility this date, but pt participating more. Pt may benefit from cont'd skilled OT to maximize IND and safety w/ ADL and fxl mobiltiy. Will cont per POC  Activity Tolerance: Patient limited by fatigue;Patient limited by endurance  Other: defer      Plan   Plan  Times per Week: 2-5  Times per Day: Daily     Subjective   Subjective  Subjective: Pt semi-supine on OT/PT approach and agreed to tx. Pain: Pt reporting no pain  Pain: Pt has no reports of pain this date. Cognition  Overall Cognitive Status: WFL (Pt appearing to be WFL, slow w/ some responses d/t fatigue/lethargy)  Arousal/Alertness: Delayed responses to stimuli  Following Commands:  Follows one step commands with increased time  Attention Span: Appears intact        Objective    Vitals     Bed Mobility Training  Bed Mobility Training: Yes  Overall Level of Assistance: Maximum assistance;Minimum assistance;Assist X2  Interventions: Verbal cues  Rolling: Maximum assistance  Supine to Sit: Maximum assistance;Minimum assistance;Assist X2  Sit to Supine: Total assistance;Assist X2  Scooting: Maximum assistance  Balance  Sitting: Impaired  Sitting - Static: Occasional (Min A to CGA for sitting EOB)  Sitting - Dynamic: Occasional  Transfer Training  Transfer Training: No  Gait Training  Gait Training: No  Wheelchair Management  Wheelchair Management: No     ADL  Grooming: Minimal assistance (MOORE/CU for oral care w/ swab, ModA for combing hair)  OT Exercises  Exercise Treatment: Seated EOB, OT sitting in front of pt on backwards chair so pt able to hold on to chair back for anterior support at EOB. Pt completed 5x ea AROM one arm at a time: fwd reach, crossbody reach, hi fwd reach, lo fwd reach. Increased time d/t fatigue     Safety Devices  Type of Devices: Left in bed;Call light within reach; Bed alarm in place;Nurse notified; All fall risk precautions in place; Patient at risk for falls  Restraints  Restraints Initially in Place: No     Patient Education  Education Given To: Patient  Education Provided: Role of Therapy  Education Method: Demonstration;Verbal  Barriers to Learning:  (cognition?/fatigue?)  Education Outcome: Continued education needed    Goals  Short Term Goals  Time Frame for Short term goals: dc  Short Term Goal 1: supine to sit Pa[place]- not met  Short Term Goal 2: sit balance CGA EOB for 5 min- met  Short Term Goal 3: grooming ADL EOB with CGA- not met  Short Term Goal 4: Tolerate fx transfer assessment- not met       Therapy Time   Individual Concurrent Group Co-treatment   Time In 1110         Time Out 1203         Minutes 53         Timed Code Treatment Minutes: Thuan 48, MOT-OTR/L [id number]

## 2022-06-29 NOTE — PROGRESS NOTES
Interval History and plan:      Stable BP  Stable creatinine 1.6    Plan:    Encourage oral intake  He is in stage IIIb chronic kidney disease since 2019. Creatinine is currently at baseline. He has 800 mg / day of proteinuria   Free light chain ratio is 2.3 acceptable for CKD   Cont to hold home Anti HTN for now  Volume stable                   Assessment :     Stage IIIb chronic kidney disease: He presented with a creatinine of 1.7. He has baseline creatinine fluctuates between 2.5-1.5. He has kidney disease since 2019. Anemia: Hemoglobin is 12 and there is no need for Procrit. Aspiration pneumonia/COVID-19: CT chest shows right lower lobe crowding and airspace disease. [facility] would like to thank Aniket Varner MD   for opportunity to serve this patient      Please call with questions at-   24 Hrs Answering service [phone] or  7 am- 5 pm via Perfect serve or cell phone  Reyna Yadav MD          CC/reason for consult :     Renal insufficiency     HPI :     Noe Ospina is a 80 y.o. male presented to   the hospital on 6/16/2022 with aspiration pneumonia    He has past medical history significant for chronic atrial fibrillation on Eliquis, aortic valve stenosis, chronic kidney disease stage III, history of COVID-19 pneumonia, rhabdomyolysis, severe protein calorie malnourishment. He is now admitted with aspiration pneumonia and is on antibiotics. He is also on isolation for COVID-19. Creatinine on arrival was 2.4. We are called for further management of renal insufficiency.     ROS:     Seen with-family in the room    positives in bold   Labs were obtained                All other remaining systems are negative or unable to obtain        PMH/PSH/SH/Family History:     Past Medical History:   Diagnosis Date    COVID-19 06/10/2022    [facility]       Past Surgical History:   Procedure Laterality Date    UPPER GASTROINTESTINAL ENDOSCOPY N/A 6/20/2022    EGD BIOPSY performed by Crhistina Martinez MD at [place] [address]        reports that he has never smoked. He has never used smokeless tobacco. He reports previous alcohol use. He reports that he does not use drugs. family history is not on file. Medication:     Current Facility-Administered Medications: HYDROcodone-acetaminophen (NORCO) 5-325 MG per tablet 1 tablet, 1 tablet, Oral, Q6H PRN  pantoprazole (PROTONIX) tablet 40 mg, 40 mg, Oral, BID AC  potassium chloride (KLOR-CON M) extended release tablet 40 mEq, 40 mEq, Oral, PRN **OR** potassium bicarb-citric acid (EFFER-K) effervescent tablet 40 mEq, 40 mEq, Oral, PRN **OR** potassium chloride 10 mEq/100 mL IVPB (Peripheral Line), 10 mEq, IntraVENous, PRN  magnesium sulfate 1000 mg in dextrose 5% 100 mL IVPB, 1,000 mg, IntraVENous, PRN  melatonin disintegrating tablet 5 mg, 5 mg, Oral, Nightly  sodium chloride flush 0.9 % injection 10 mL, 10 mL, IntraVENous, 2 times per day  sodium chloride flush 0.9 % injection 10 mL, 10 mL, IntraVENous, PRN  0.9 % sodium chloride infusion, , IntraVENous, PRN  enoxaparin (LOVENOX) injection 40 mg, 40 mg, SubCUTAneous, Daily  ondansetron (ZOFRAN) injection 4 mg, 4 mg, IntraVENous, Q4H PRN  polyethylene glycol (GLYCOLAX) packet 17 g, 17 g, Oral, Daily PRN  acetaminophen (TYLENOL) tablet 650 mg, 650 mg, Oral, Q4H PRN **OR** acetaminophen (TYLENOL) suppository 650 mg, 650 mg, Rectal, Q4H PRN       Vitals :     Vitals:    06/29/22 0917   BP: 113/81   Pulse: 83   Resp: 17   Temp: 97.4 °F (36.3 °C)   SpO2: 99%       I & O :       Intake/Output Summary (Last 24 hours) at 6/29/2022 0948  Last data filed at 6/29/2022 0409  Gross per 24 hour   Intake 360 ml   Output 525 ml   Net -165 ml        Physical Examination :     Minimal exam was performed to minimize exposure to COVID-19. Patient was seen and evaluated from the door.   Preserving PPE       LABS:     Recent Labs     06/27/22  0700 06/28/22  0933 06/29/22  0734   WBC 7.2 7.2 8.1

## 2022-06-30 LAB
ANION GAP SERPL CALCULATED.3IONS-SCNC: 9 MMOL/L (ref 3–16)
BASOPHILS ABSOLUTE: 0.1 K/UL (ref 0–0.2)
BASOPHILS RELATIVE PERCENT: 0.7 %
BUN BLDV-MCNC: 19 MG/DL (ref 7–20)
CALCIUM SERPL-MCNC: 8.2 MG/DL (ref 8.3–10.6)
CHLORIDE BLD-SCNC: 106 MMOL/L (ref 99–110)
CO2: 23 MMOL/L (ref 21–32)
CREAT SERPL-MCNC: 1.7 MG/DL (ref 0.8–1.3)
EOSINOPHILS ABSOLUTE: 0.1 K/UL (ref 0–0.6)
EOSINOPHILS RELATIVE PERCENT: 1.8 %
GFR AFRICAN AMERICAN: 46
GFR NON-AFRICAN AMERICAN: 38
GLUCOSE BLD-MCNC: 99 MG/DL (ref 70–99)
HCT VFR BLD CALC: 31.4 % (ref 40.5–52.5)
HEMOGLOBIN: 10.5 G/DL (ref 13.5–17.5)
LYMPHOCYTES ABSOLUTE: 0.6 K/UL (ref 1–5.1)
LYMPHOCYTES RELATIVE PERCENT: 7.1 %
MCH RBC QN AUTO: 29.1 PG (ref 26–34)
MCHC RBC AUTO-ENTMCNC: 33.5 G/DL (ref 31–36)
MCV RBC AUTO: 87 FL (ref 80–100)
MONOCYTES ABSOLUTE: 0.5 K/UL (ref 0–1.3)
MONOCYTES RELATIVE PERCENT: 6.6 %
NEUTROPHILS ABSOLUTE: 6.6 K/UL (ref 1.7–7.7)
NEUTROPHILS RELATIVE PERCENT: 83.8 %
PDW BLD-RTO: 16.1 % (ref 12.4–15.4)
PLATELET # BLD: 301 K/UL (ref 135–450)
PMV BLD AUTO: 7.2 FL (ref 5–10.5)
POTASSIUM REFLEX MAGNESIUM: 4.3 MMOL/L (ref 3.5–5.1)
RBC # BLD: 3.61 M/UL (ref 4.2–5.9)
SODIUM BLD-SCNC: 138 MMOL/L (ref 136–145)
WBC # BLD: 7.9 K/UL (ref 4–11)

## 2022-06-30 PROCEDURE — 2580000003 HC RX 258: Performed by: INTERNAL MEDICINE

## 2022-06-30 PROCEDURE — 97530 THERAPEUTIC ACTIVITIES: CPT

## 2022-06-30 PROCEDURE — 36415 COLL VENOUS BLD VENIPUNCTURE: CPT

## 2022-06-30 PROCEDURE — 6360000002 HC RX W HCPCS: Performed by: INTERNAL MEDICINE

## 2022-06-30 PROCEDURE — 6370000000 HC RX 637 (ALT 250 FOR IP): Performed by: INTERNAL MEDICINE

## 2022-06-30 PROCEDURE — 85025 COMPLETE CBC W/AUTO DIFF WBC: CPT

## 2022-06-30 PROCEDURE — 80048 BASIC METABOLIC PNL TOTAL CA: CPT

## 2022-06-30 PROCEDURE — 1200000000 HC SEMI PRIVATE

## 2022-06-30 RX ADMIN — SODIUM CHLORIDE, PRESERVATIVE FREE 10 ML: 5 INJECTION INTRAVENOUS at 21:24

## 2022-06-30 RX ADMIN — HYDROCODONE BITARTRATE AND ACETAMINOPHEN 1 TABLET: 5; 325 TABLET ORAL at 03:59

## 2022-06-30 RX ADMIN — Medication 5 MG: at 21:24

## 2022-06-30 RX ADMIN — PANTOPRAZOLE SODIUM 40 MG: 40 TABLET, DELAYED RELEASE ORAL at 18:03

## 2022-06-30 RX ADMIN — ENOXAPARIN SODIUM 40 MG: 100 INJECTION SUBCUTANEOUS at 08:51

## 2022-06-30 RX ADMIN — PANTOPRAZOLE SODIUM 40 MG: 40 TABLET, DELAYED RELEASE ORAL at 08:51

## 2022-06-30 ASSESSMENT — PAIN SCALES - WONG BAKER
WONGBAKER_NUMERICALRESPONSE: 0
WONGBAKER_NUMERICALRESPONSE: 0

## 2022-06-30 ASSESSMENT — PAIN SCALES - GENERAL
PAINLEVEL_OUTOF10: 7
PAINLEVEL_OUTOF10: 0

## 2022-06-30 ASSESSMENT — PAIN DESCRIPTION - LOCATION: LOCATION: HAND

## 2022-06-30 ASSESSMENT — PAIN DESCRIPTION - ORIENTATION: ORIENTATION: LEFT

## 2022-06-30 NOTE — PLAN OF CARE
Problem: Respiratory - Adult  Goal: Achieves optimal ventilation and oxygenation  Outcome: Progressing     Problem: Pain  Goal: Verbalizes/displays adequate comfort level or baseline comfort level  Outcome: Progressing     Problem: Safety - Adult  Goal: Free from fall injury  Outcome: Progressing     Problem: Skin/Tissue Integrity  Goal: Absence of new skin breakdown  Description: 1. Monitor for areas of redness and/or skin breakdown  2. Assess vascular access sites hourly  3. Every 4-6 hours minimum:  Change oxygen saturation probe site  4. Every 4-6 hours:  If on nasal continuous positive airway pressure, respiratory therapy assess nares and determine need for appliance change or resting period.   Outcome: Progressing

## 2022-06-30 NOTE — PROGRESS NOTES
Physical Therapy  Facility/Department: [address]  Physical Therapy Treatment    Name: Francesca Walsh  : 1935  MRN: [id number]  Date of Service: 2022    Discharge Recommendations:  Francesca Walsh scored a 724 on the AM-PAC short mobility form. Current research shows that an AM-PAC score of 17 or less is typically not associated with a discharge to the patient's home setting. Based on the patient's AM-PAC score and their current functional mobility deficits, it is recommended that the patient have 3-5 sessions per week of Physical Therapy at d/c to increase the patient's independence. Please see assessment section for further patient specific details. If patient discharges prior to next session this note will serve as a discharge summary. Please see below for the latest assessment towards goals. PT Equipment Recommendations  Other: defer      Patient Diagnosis(es): The primary encounter diagnosis was Aspiration into airway, initial encounter. A diagnosis of Dysphagia, unspecified type was also pertinent to this visit. Past Medical History:  has a past medical history of COVID-19. Past Surgical History:  has a past surgical history that includes Upper gastrointestinal endoscopy (N/A, 2022). Assessment   Body Structures, Functions, Activity Limitations Requiring Skilled Therapeutic Intervention: Decreased functional mobility ; Decreased ROM; Decreased strength;Decreased endurance;Decreased cognition;Decreased balance; Increased pain  Assessment: Pt limited this session by fatigue and drowsiness, requiring frequent encouragement to stay awake at beginning of session. Pt with increased alertness once assisted into sitting, however pt fatigued very quickly and required return to supine. Attempted therex in supine, however pt became too drowsy and session was ended.   Significant increased time to perform bed mobility due to weakness and drowsiness, however pt was able to perform with slightly less assist vs previous session. Safety concerns for return home due to heavy assist required and current inability to ambulate. Pt will benefit from continued skilled therapy to maximize safety and independence. Treatment Diagnosis: Decreased mobility and endurance  Activity Tolerance  Activity Tolerance: Patient limited by fatigue;Patient limited by pain; Patient limited by endurance  Activity Tolerance Comments: drowsiness     Plan   Plan  Plan:  ((2-5))  Current Treatment Recommendations: Strengthening,ROM,Functional mobility training,Transfer training,Patient/Caregiver education & training,Pain management,Gait training,Stair training,Endurance training  Safety Devices  Type of Devices: All fall risk precautions in place,Bed alarm in place,Call light within reach,Left in bed,Nurse notified  Restraints  Restraints Initially in Place: No     Restrictions  Position Activity Restriction  Other position/activity restrictions: up with assist     Subjective   Pain: Pt with intermittent reports of unrated BLE pain with movement  General  Chart Reviewed: Yes  Patient assessed for rehabilitation services?: Yes  Additional Pertinent Hx: Pt is an 80year old male that presents from home to the ED from c/o of a fall from weakness due to COVID + diagnosis on 6/9. Chest CT: Right lower lobe bronchovascular crowding and airspace disease, small pneumonia with adjacent pleural thickening. PMH: Only covid listed due to pt unable to state history. Family / Caregiver Present: No  Diagnosis: Aspiration into Airway. Follows Commands: Within Functional Limits  General Comment  Comments: Pt found supine in bed upon PT arrival.  Pt agreeable to therapy session. Increased time to perform all mobility this session due to weakness and drowsiness. Attempted supine therex, however pt began to fall back asleep once in supine. Session terminated due to pt's inability to remain awake. Subjective  Subjective: \"I'm just not sure. \" Pt unable to recall the last time he ambulated. Social/Functional History  Social/Functional History  Lives With: Alone,Daughter  Type of Home: House  Home Layout: One level,Able to Live on Main level with bedroom/bathroom  Home Access: Stairs to enter with rails  Entrance Stairs - Number of Steps: 2  Entrance Stairs - Rails: Both  Bathroom Shower/Tub: Walk-in shower  Bathroom Equipment: Grab bars in shower,Shower chair  Bathroom Accessibility: Wheelchair accessible  Home Equipment: Walker, rolling,Cane  Has the patient had two or more falls in the past year or any fall with injury in the past year?: Yes  Receives Help From: Family  ADL Assistance: Independent  Toileting: Independent  Homemaking Assistance: Independent  Ambulation Assistance: Needs assistance  Transfer Assistance: Independent  Active : No  Occupation: Retired  Additional Comments: History was taken by daughter present in the room. Pt was living independently before the fall and is planning to go home with daughter who has a handicap suite in her house.   Vision/Hearing       Cognition         Objective   Heart Rate: 78  Heart Rate Source: Monitor  BP: 118/80  BP Location: Right upper arm  BP Method: Automatic  Patient Position: Semi fowlers  MAP (Calculated): 92.67  Resp: 18  SpO2: 96 %  O2 Device: None (Room air)                             Bed mobility  Supine to Sit: Maximum assistance (HOB elevated, use of bedrail, heavy cues for technique, assist for BLE and trunk placement, significant increased time)  Sit to Supine: Dependent/Total (significant increased time, heavy cues for technique, assist for BLE and trunk placement, pt too fatigued to initiate transfer)  Scooting: Dependent/Total (to scoot up in bed in supine with bed in trendelenberg, pt unable to place/hold BLE in bridge position or utilize bedrails to assist)  Transfers  Comment: unsafe to attempt, pt unable to maintain sitting balance EOB without progressing assist.  Pt also fatiguing quickly in sitting EOB, becoming too fatigued to remain upright. Ambulation  Comments: unsafe to attempt this session     Balance  Comments: Pt maintained sitting balance EOB for approximately 45 seconds before reporting that he is too fatigued to continue with session. Pt initially only requiring Paola to maintain balance and prevent anterior/right lateral lean, however progressed to [address] with fatigue. PT initially attempting to have pt perform ankle pumps sitting EOB, pt able to perform x1 rep each LE, but then unable to perform further reps. Exercise Treatment: Attempted to perform seated ankle pumps EOB, however pt only able to perform x1 rep each LE before becoming too fatigued to continue. Attempted ankle pumps in supine, however pt began to fall back asleep and session was ended. OutComes Score                                                  AM-PAC Score  AM-PAC Inpatient Mobility Raw Score : 7 (06/30/22 1056)  AM-PAC Inpatient T-Scale Score : 26.42 (06/30/22 1056)  Mobility Inpatient CMS 0-100% Score: 92.36 (06/30/22 1056)  Mobility Inpatient CMS G-Code Modifier : CM (06/30/22 1056)          Goals  Short Term Goals  Time Frame for Short term goals: discharge  ongoing 6/27  Short term goal 1: Rolling Left Mod A, on going  Short term goal 2: Supine to sit Mod A, on going  Short term goal 3: Sitting balance for 8 minutes with no LOB, on going  Patient Goals   Patient goals : Not stated       Education  Patient Education  Education Given To: Patient  Education Provided Comments: seated balance  Education Method: Verbal;Demonstration  Barriers to Learning: Cognition; Hearing  Education Outcome: Verbalized understanding;Continued education needed      Therapy Time   Individual Concurrent Group Co-treatment   Time In 0930         Time Out 0954         Minutes 24              Timed Code Treatment Minutes:  24    Total Treatment Minutes:  LORI Klein    This note to serve as discharge summary if patient discharged before next session.

## 2022-06-30 NOTE — PROGRESS NOTES
Nutrition Note    RECOMMENDATIONS  1. PO Diet: dysphagia minced and moist, moderately thick liquids  2. ONS: magic cup TID  3. Weight: obtain current body wt. 4. Nutrition Support: n/a     NUTRITION ASSESSMENT   Pt continues on dysphagia soft and bite-sized diet  with monderately thick liquids. PO intake variable; 25 - 75%. Magic Cup oral nutrition supplement being provided TID.  Nutrition Related Findings: 6/29 LBM; oriented to person only   Wounds: None   Nutrition Education:  Education not indicated,No recommendation at this time    Nutrition Goals: PO intake 50% or greater     MALNUTRITION ASSESSMENT   Acute Illness  Malnutrition Status: Insufficient data      NUTRITION DIAGNOSIS   · Inadequate oral intake related to impaired respiratory function as evidenced by intake 26-50%        CURRENT NUTRITION THERAPIES  ADULT DIET; Dysphagia - Minced and Moist; Moderately Thick (Honey)  ADULT ORAL NUTRITION SUPPLEMENT; Breakfast, Lunch, Dinner; Frozen Oral Supplement       ANTHROPOMETRICS   Current Height: 5' 10\" (177.8 cm)   Current Weight: 156 lb (70.8 kg) admission wt    Ideal Body Weight (IBW): 166 lbs  (75 kg)      BMI: 22.4      The patient will be monitored per nutrition standards of care. Consult dietitian if additional nutrition interventions are needed prior to RD reassessment.      Luiza Patel, ELENA, LD    Contact: [phone]

## 2022-06-30 NOTE — PROGRESS NOTES
Hospitalist Progress Note      PCP: Aldair Donovan MD    Date of Admission: 6/16/2022    Chief Complaint: Aspiration Pneumonia    Hospital Course: H&P    Subjective:   [place] [employer] and examined at bedside. Patient alert, awake. Per staff patient tolerating minced and moist diet however needing one-to-one feeding assistance    Waiting for placement. No new issues        Medications:  Reviewed    Infusion Medications    sodium chloride 25 mL (06/23/22 0528)     Scheduled Medications    pantoprazole  40 mg Oral BID AC    melatonin  5 mg Oral Nightly    sodium chloride flush  10 mL IntraVENous 2 times per day    enoxaparin  40 mg SubCUTAneous Daily     PRN Meds: HYDROcodone 5 mg - acetaminophen, potassium chloride **OR** potassium alternative oral replacement **OR** potassium chloride, magnesium sulfate, sodium chloride flush, sodium chloride, ondansetron, polyethylene glycol, acetaminophen **OR** acetaminophen      Intake/Output Summary (Last 24 hours) at 6/30/2022 1607  Last data filed at 6/30/2022 0850  Gross per 24 hour   Intake 300 ml   Output 550 ml   Net -250 ml       Physical Exam Performed:    /80   Pulse 78   Temp 97.9 °F (36.6 °C) (Oral)   Resp 18   Ht 5' 10\" (1.778 m)   Wt 156 lb (70.8 kg)   SpO2 96%   BMI 22.38 kg/m²     General appearance: NAD, lying in bed  HEENT: Pupils equal, round. No JVD  Neck: Supple, with full range of motion. No jugular venous distention. Respiratory:  Normal respiratory effort. Clear to auscultation, bilaterally without Rales/Wheezes/Rhonchi. Cardiovascular: Regular rate and rhythm with normal S1/S2 without murmurs, rubs or gallops. Abdomen: Soft, non-tender, non-distended with normal bowel sounds. Musculoskeletal: No clubbing, cyanosis or edema bilaterally. Full range of motion without deformity. Skin: Skin color, texture, turgor normal.  No rashes or lesions. Neurologic:  [place].   grossly non-focal.  Psychiatric: Alert and awake  Capillary Refill: Brisk,3 seconds, normal   Peripheral Pulses: +2 palpable, equal bilaterally       Labs:   Recent Labs     06/28/22  0933 06/29/22  0734 06/30/22  0621   WBC 7.2 8.1 7.9   HGB 10.6* 10.6* 10.5*   HCT 32.4* 31.2* 31.4*    309 301     Recent Labs     06/28/22  0933 06/29/22  0734 06/30/22  0621    142 138   K 3.9 3.7 4.3    109 106   CO2 23 22 23   BUN 17 18 19   CREATININE 1.5* 1.6* 1.7*   CALCIUM 8.2* 8.1* 8.2*     No results for input(s): AST, ALT, BILIDIR, BILITOT, ALKPHOS in the last 72 hours. No results for input(s): INR in the last 72 hours. No results for input(s): Marcelino Gey in the last 72 hours. Urinalysis:      Lab Results   Component Value Date/Time    NITRU Negative 06/20/2022 01:05 PM    [address] 0-2 06/20/2022 01:05 PM    RBCUA  06/20/2022 01:05 PM    BLOODU LARGE 06/20/2022 01:05 PM    SPECGRAV 1.025 06/20/2022 01:05 PM    GLUCOSEU Negative 06/20/2022 01:05 PM     Radiology:  [facility] MODIFIED BARIUM SWALLOW W VIDEO   Final Result      1. Multilevel episodes of tracheal aspiration as detailed above. CT CHEST WO CONTRAST   Final Result   1. Right lower lobe bronchovascular crowding and airspace disease, small pneumonia with adjacent pleural thickening   2. Large hiatal hernia   3. Cardiomegaly with coronary artery calcification   4. Vertebral body fracture of L1 is age indeterminate. XR CHEST PORTABLE   Final Result   1. Cardiomegaly and aortic tortuosity   2. Minimal hazy density right lung base, differential radiolucency of the lungs is secondary to patient rotation   3. Hypertrophic osteophytes of the shoulders, left greater than right              Assessment/Plan:        Pneumonia, due to aspiration   - CT: Right lower lobe pneumonia  - Pxt has a large Hiatal hernia, and dementia, all which predispose him to recurrent aspirations.    - Pt tested positive for the COVID-19 virus in 06/09/2022, but did not appear to have been felt to have COVID pneumonia. Tested negative 6/18/2022  - Seen by SLP.  MBS showed multilevel aspiration  - Finished 7 days of Unasyn (6/16-6/23) followed by Augmentin (6/23-6-28)  - Can prob be weaned off O2 (% on 2L)  - Dysphagia diet;  Minced & Moist, with moderately Thick liquids  - If having recurrent aspirations, may need Surgery eval. in view of his large hiatal hernia; although he may not appear to be an ideal candidate clarence as will likely re-aspirate in view of the size of his hernia, placing him at high risk of recurrent aspiration, respiratory failure. Was seen by Dr. Johnny Tenorio: O/p f/u in 2 weeks. - Aspiration precautions, SLP follow up  - Pall Med was consulted re goals of care/code status        Acute respiratory failure with hypoxia:  Sec to aspiration pneumonia  - O2 sat was less than 90% on room air.    - Completed antibx for pneumonia  - Can prob be weaned off O2 (% on 2L)        Esophageal ulcer: POA  - Seen by GI. Had EGD: esophageal ulcer and biopsies obtained. Circumferential Brito's to 25cms   Pathology: chronic inflammation and reactive changes. - No intestinal metaplasia, dysplasia, or malignancy identified. - BID PPI x 8 weeks; then daily        Generalized weakness   Dementia  - Update B12, level; TSH: ordered. Pending.  - Supportive care        Acute metabolic encephalopathy   - Appears resolved, back to baseline  - Supportive care        CKD stage IIIb  He presented with a creatinine of 1.7. He has baseline creatinine between 2.5-1.5 since at least 2019. MARY ruled out  -Nephrology following        HTN-controlled inpatient without medication  - Continue to hold home Toprol for now        DVT Prophylaxis: Lovenox  Diet: ADULT DIET;  Dysphagia - Minced and Moist; Moderately Thick (Honey)  ADULT ORAL NUTRITION SUPPLEMENT; Breakfast, Lunch, Dinner; Frozen Oral Supplement  Code Status: Full Code    PT/OT Eval Status: ordered    Dispo/plan:  - Patient ready for discharge pending placement/precert    Latrell Warren MD

## 2022-06-30 NOTE — PROGRESS NOTES
Interval History and plan:      BP is well controlled   Stable creatinine 1.7    Plan:    Encourage oral intake  He is in stage IIIb chronic kidney disease since 2019. Creatinine is currently near baseline. He has 800 mg / day of proteinuria   Free light chain ratio is 2.3 acceptable for CKD   He is off BP medicines                    Assessment :     Stage IIIb chronic kidney disease: He presented with a creatinine of 1.7. He has baseline creatinine fluctuates between 2.5-1.5. He has kidney disease since 2019. Anemia: Hemoglobin is 10.5 and there is no need for Procrit. Aspiration pneumonia/COVID-19: CT chest shows right lower lobe crowding and airspace disease. [facility] would like to thank Ginger Marie MD   for opportunity to serve this patient      Please call with questions at-   24 Hrs Answering service [phone] or  7 am- 5 pm via Perfect serve or cell phone  Valente Fuchs MD          CC/reason for consult :     Renal insufficiency     HPI :     Mariam Sylvester is a 80 y.o. male presented to   the hospital on 6/16/2022 with aspiration pneumonia    He has past medical history significant for chronic atrial fibrillation on Eliquis, aortic valve stenosis, chronic kidney disease stage III, history of COVID-19 pneumonia, rhabdomyolysis, severe protein calorie malnourishment. He is now admitted with aspiration pneumonia and is on antibiotics. He is also on isolation for COVID-19. Creatinine on arrival was 2.4. We are called for further management of renal insufficiency.     ROS:     Seen with-family in the room    positives in bold   Labs were obtained                All other remaining systems are negative or unable to obtain        PMH/PSH/SH/Family History:     Past Medical History:   Diagnosis Date    COVID-19 06/10/2022    [facility]       Past Surgical History:   Procedure Laterality Date    UPPER GASTROINTESTINAL ENDOSCOPY N/A 6/20/2022    EGD BIOPSY performed by Dionna Salcido MD at [place] [address]        reports that he has never smoked. He has never used smokeless tobacco. He reports previous alcohol use. He reports that he does not use drugs. family history is not on file. Medication:     Current Facility-Administered Medications: HYDROcodone-acetaminophen (NORCO) 5-325 MG per tablet 1 tablet, 1 tablet, Oral, Q6H PRN  pantoprazole (PROTONIX) tablet 40 mg, 40 mg, Oral, BID AC  potassium chloride (KLOR-CON M) extended release tablet 40 mEq, 40 mEq, Oral, PRN **OR** potassium bicarb-citric acid (EFFER-K) effervescent tablet 40 mEq, 40 mEq, Oral, PRN **OR** potassium chloride 10 mEq/100 mL IVPB (Peripheral Line), 10 mEq, IntraVENous, PRN  magnesium sulfate 1000 mg in dextrose 5% 100 mL IVPB, 1,000 mg, IntraVENous, PRN  melatonin disintegrating tablet 5 mg, 5 mg, Oral, Nightly  sodium chloride flush 0.9 % injection 10 mL, 10 mL, IntraVENous, 2 times per day  sodium chloride flush 0.9 % injection 10 mL, 10 mL, IntraVENous, PRN  0.9 % sodium chloride infusion, , IntraVENous, PRN  enoxaparin (LOVENOX) injection 40 mg, 40 mg, SubCUTAneous, Daily  ondansetron (ZOFRAN) injection 4 mg, 4 mg, IntraVENous, Q4H PRN  polyethylene glycol (GLYCOLAX) packet 17 g, 17 g, Oral, Daily PRN  acetaminophen (TYLENOL) tablet 650 mg, 650 mg, Oral, Q4H PRN **OR** acetaminophen (TYLENOL) suppository 650 mg, 650 mg, Rectal, Q4H PRN       Vitals :     Vitals:    06/30/22 0359   BP: 120/80   Pulse: 85   Resp: 18   Temp: 98 °F (36.7 °C)   SpO2: 97%       I & O :       Intake/Output Summary (Last 24 hours) at 6/30/2022 0828  Last data filed at 6/30/2022 0359  Gross per 24 hour   Intake 360 ml   Output 750 ml   Net -390 ml        Physical Examination :     Minimal exam was performed to minimize exposure to COVID-19. Patient was seen and evaluated from the door.   Preserving PPE       LABS:     Recent Labs     06/28/22  0933 06/29/22  0734 06/30/22  0621   WBC 7.2 8.1 7.9   HGB 10.6* 10.6* 10.5*   HCT 32.4* 31.2* 31.4*    309 301     Recent Labs     06/28/22  0933 06/29/22  0734 06/30/22  0621    142 138   K 3.9 3.7 4.3    109 106   CO2 23 22 23   BUN 17 18 19   CREATININE 1.5* 1.6* 1.7*   GLUCOSE 96 95 99      Nephrology  [address] [place] [address] [facility]  Office: [phone]  Fax: [phone]

## 2022-06-30 NOTE — CARE COORDINATION
ENO received call from 15[address] Dhiraj Bynum at [facility] that cooperate approved pt to come despite being out of network. [address] Bynum will contact [address] to get precert changed from [facility] to [facility]. NEO updated Jeannine at [facility].         Electronically signed by EDUARD Arenas, BRENDA on 6/30/2022 at 10:18 AM

## 2022-07-01 LAB
ANION GAP SERPL CALCULATED.3IONS-SCNC: 13 MMOL/L (ref 3–16)
BASOPHILS ABSOLUTE: 0 K/UL (ref 0–0.2)
BASOPHILS RELATIVE PERCENT: 0.4 %
BUN BLDV-MCNC: 19 MG/DL (ref 7–20)
CALCIUM SERPL-MCNC: 8.5 MG/DL (ref 8.3–10.6)
CHLORIDE BLD-SCNC: 103 MMOL/L (ref 99–110)
CO2: 23 MMOL/L (ref 21–32)
CREAT SERPL-MCNC: 1.6 MG/DL (ref 0.8–1.3)
EOSINOPHILS ABSOLUTE: 0.1 K/UL (ref 0–0.6)
EOSINOPHILS RELATIVE PERCENT: 1.9 %
GFR AFRICAN AMERICAN: 50
GFR NON-AFRICAN AMERICAN: 41
GLUCOSE BLD-MCNC: 89 MG/DL (ref 70–99)
HCT VFR BLD CALC: 32.6 % (ref 40.5–52.5)
HEMOGLOBIN: 10.9 G/DL (ref 13.5–17.5)
LYMPHOCYTES ABSOLUTE: 0.8 K/UL (ref 1–5.1)
LYMPHOCYTES RELATIVE PERCENT: 12.2 %
MCH RBC QN AUTO: 29 PG (ref 26–34)
MCHC RBC AUTO-ENTMCNC: 33.4 G/DL (ref 31–36)
MCV RBC AUTO: 86.9 FL (ref 80–100)
MONOCYTES ABSOLUTE: 0.4 K/UL (ref 0–1.3)
MONOCYTES RELATIVE PERCENT: 6.2 %
NEUTROPHILS ABSOLUTE: 5 K/UL (ref 1.7–7.7)
NEUTROPHILS RELATIVE PERCENT: 79.3 %
PDW BLD-RTO: 16.3 % (ref 12.4–15.4)
PLATELET # BLD: 336 K/UL (ref 135–450)
PMV BLD AUTO: 7.4 FL (ref 5–10.5)
POTASSIUM REFLEX MAGNESIUM: 4.7 MMOL/L (ref 3.5–5.1)
RBC # BLD: 3.76 M/UL (ref 4.2–5.9)
SODIUM BLD-SCNC: 139 MMOL/L (ref 136–145)
WBC # BLD: 6.3 K/UL (ref 4–11)

## 2022-07-01 PROCEDURE — 6370000000 HC RX 637 (ALT 250 FOR IP): Performed by: INTERNAL MEDICINE

## 2022-07-01 PROCEDURE — 80048 BASIC METABOLIC PNL TOTAL CA: CPT

## 2022-07-01 PROCEDURE — 2580000003 HC RX 258: Performed by: INTERNAL MEDICINE

## 2022-07-01 PROCEDURE — 1200000000 HC SEMI PRIVATE

## 2022-07-01 PROCEDURE — 85025 COMPLETE CBC W/AUTO DIFF WBC: CPT

## 2022-07-01 PROCEDURE — 6360000002 HC RX W HCPCS: Performed by: INTERNAL MEDICINE

## 2022-07-01 PROCEDURE — 36415 COLL VENOUS BLD VENIPUNCTURE: CPT

## 2022-07-01 RX ADMIN — Medication 5 MG: at 19:54

## 2022-07-01 RX ADMIN — PANTOPRAZOLE SODIUM 40 MG: 40 TABLET, DELAYED RELEASE ORAL at 06:22

## 2022-07-01 RX ADMIN — HYDROCODONE BITARTRATE AND ACETAMINOPHEN 1 TABLET: 5; 325 TABLET ORAL at 06:22

## 2022-07-01 RX ADMIN — SODIUM CHLORIDE, PRESERVATIVE FREE 10 ML: 5 INJECTION INTRAVENOUS at 10:08

## 2022-07-01 RX ADMIN — PANTOPRAZOLE SODIUM 40 MG: 40 TABLET, DELAYED RELEASE ORAL at 15:51

## 2022-07-01 RX ADMIN — ENOXAPARIN SODIUM 40 MG: 100 INJECTION SUBCUTANEOUS at 10:09

## 2022-07-01 ASSESSMENT — PAIN SCALES - GENERAL
PAINLEVEL_OUTOF10: 0

## 2022-07-01 ASSESSMENT — PAIN DESCRIPTION - ORIENTATION: ORIENTATION: RIGHT;LEFT

## 2022-07-01 ASSESSMENT — PAIN SCALES - WONG BAKER: WONGBAKER_NUMERICALRESPONSE: 0

## 2022-07-01 ASSESSMENT — PAIN DESCRIPTION - LOCATION: LOCATION: KNEE

## 2022-07-01 NOTE — PLAN OF CARE
Problem: ABCDS Injury Assessment  Goal: Absence of physical injury  Outcome: Progressing  Flowsheets (Taken 6/30/2022 2005)  Absence of Physical Injury: Implement safety measures based on patient assessment     Problem: Respiratory - Adult  Goal: Achieves optimal ventilation and oxygenation  Outcome: Progressing  Flowsheets (Taken 6/30/2022 1942)  Achieves optimal ventilation and oxygenation:   Assess for changes in respiratory status   Assess for changes in mentation and behavior     Problem: Discharge Planning  Goal: Discharge to home or other facility with appropriate resources  Outcome: Progressing  Flowsheets (Taken 6/30/2022 1942)  Discharge to home or other facility with appropriate resources:   Identify barriers to discharge with patient and caregiver   Identify discharge learning needs (meds, wound care, etc)   Arrange for needed discharge resources and transportation as appropriate   Refer to discharge planning if patient needs post-hospital services based on physician order or complex needs related to functional status, cognitive ability or social support system     Problem: Pain  Goal: Verbalizes/displays adequate comfort level or baseline comfort level  Outcome: Progressing  Flowsheets (Taken 6/30/2022 2005)  Verbalizes/displays adequate comfort level or baseline comfort level:   Encourage patient to monitor pain and request assistance   Assess pain using appropriate pain scale   Administer analgesics based on type and severity of pain and evaluate response     Problem: Safety - Adult  Goal: Free from fall injury  Outcome: Progressing  Flowsheets (Taken 6/30/2022 2005)  Free From Fall Injury: Instruct family/caregiver on patient safety     Problem: Skin/Tissue Integrity  Goal: Absence of new skin breakdown  Description: 1. Monitor for areas of redness and/or skin breakdown  2. Assess vascular access sites hourly  3. Every 4-6 hours minimum:  Change oxygen saturation probe site  4.   Every 4-6 hours: If on nasal continuous positive airway pressure, respiratory therapy assess nares and determine need for appliance change or resting period.   Outcome: Progressing     Problem: Nutrition Deficit:  Goal: Optimize nutritional status  Outcome: Progressing

## 2022-07-01 NOTE — PLAN OF CARE
Problem: ABCDS Injury Assessment  Goal: Absence of physical injury  7/1/2022 0756 by Alisia Farah  Outcome: Progressing  7/1/2022 0123 by Yamileth Harrington RN  Outcome: Progressing  Flowsheets (Taken 6/30/2022 2005)  Absence of Physical Injury: Implement safety measures based on patient assessment     Problem: Respiratory - Adult  Goal: Achieves optimal ventilation and oxygenation  7/1/2022 0756 by Alisia Farah  Outcome: Progressing  Flowsheets (Taken 7/1/2022 0730)  Achieves optimal ventilation and oxygenation:   Assess for changes in respiratory status   Assess for changes in mentation and behavior   Position to facilitate oxygenation and minimize respiratory effort   Oxygen supplementation based on oxygen saturation or arterial blood gases   Assess the need for suctioning and aspirate as needed   Assess and instruct to report shortness of breath or any respiratory difficulty   Respiratory therapy support as indicated  7/1/2022 0123 by Yamileth Harrington RN  Outcome: Progressing  Flowsheets (Taken 6/30/2022 1942)  Achieves optimal ventilation and oxygenation:   Assess for changes in respiratory status   Assess for changes in mentation and behavior     Problem: Discharge Planning  Goal: Discharge to home or other facility with appropriate resources  7/1/2022 0756 by Alisia Farah  Outcome: Progressing  Flowsheets (Taken 7/1/2022 0730)  Discharge to home or other facility with appropriate resources:   Identify barriers to discharge with patient and caregiver   Arrange for needed discharge resources and transportation as appropriate   Identify discharge learning needs (meds, wound care, etc)  7/1/2022 0123 by Yamileth Harrington RN  Outcome: Progressing  Flowsheets (Taken 6/30/2022 1942)  Discharge to home or other facility with appropriate resources:   Identify barriers to discharge with patient and caregiver   Identify discharge learning needs (meds, wound care, etc)   Arrange for needed discharge resources and transportation as appropriate   Refer to discharge planning if patient needs post-hospital services based on physician order or complex needs related to functional status, cognitive ability or social support system     Problem: Pain  Goal: Verbalizes/displays adequate comfort level or baseline comfort level  7/1/2022 0756 by Nona Marina  Outcome: Progressing  Flowsheets (Taken 7/1/2022 0730)  Verbalizes/displays adequate comfort level or baseline comfort level:   Encourage patient to monitor pain and request assistance   Assess pain using appropriate pain scale   Administer analgesics based on type and severity of pain and evaluate response   Implement non-pharmacological measures as appropriate and evaluate response   Consider cultural and social influences on pain and pain management  7/1/2022 0123 by Justin Kennedy RN  Outcome: Progressing  Flowsheets (Taken 6/30/2022 2005)  Verbalizes/displays adequate comfort level or baseline comfort level:   Encourage patient to monitor pain and request assistance   Assess pain using appropriate pain scale   Administer analgesics based on type and severity of pain and evaluate response     Problem: Safety - Adult  Goal: Free from fall injury  7/1/2022 0756 by Nona Marina  Outcome: Progressing  7/1/2022 0123 by Justin Kennedy RN  Outcome: Progressing  Flowsheets (Taken 6/30/2022 2005)  Free From Fall Injury: Instruct family/caregiver on patient safety     Problem: Skin/Tissue Integrity  Goal: Absence of new skin breakdown  Description: 1. Monitor for areas of redness and/or skin breakdown  2. Assess vascular access sites hourly  3. Every 4-6 hours minimum:  Change oxygen saturation probe site  4. Every 4-6 hours:  If on nasal continuous positive airway pressure, respiratory therapy assess nares and determine need for appliance change or resting period.   7/1/2022 0756 by Nona Marina  Outcome: Progressing  7/1/2022 0123 by Justin Kennedy RN  Outcome: Progressing     Problem: Nutrition Deficit:  Goal: Optimize nutritional status  7/1/2022 0756 by Cristiana Ulrich  Outcome: Progressing  7/1/2022 0123 by Herminia Ahn RN  Outcome: Progressing

## 2022-07-01 NOTE — PROGRESS NOTES
Interval History and plan:      BP is well controlled   Stable creatinine 1.6    Plan:    Encourage oral intake  He is in stage IIIb chronic kidney disease since 2019. Creatinine is currently near baseline. He has 800 mg / day of proteinuria   Free light chain ratio is 2.3 acceptable for CKD   He is off BP medicines   Will follow as out patient on DC                   Assessment :     Stage IIIb chronic kidney disease: He presented with a creatinine of 1.7. He has baseline creatinine fluctuates between 2.5-1.5. He has kidney disease since 2019. Anemia: Hemoglobin is 10.5 and there is no need for Procrit. Aspiration pneumonia/COVID-19: CT chest shows right lower lobe crowding and airspace disease. [facility] would like to thank Junior Benito MD   for opportunity to serve this patient      Please call with questions at-   24 Hrs Answering service [phone] or  7 am- 5 pm via Perfect serve or cell phone  Zuri Montesinos MD          CC/reason for consult :     Renal insufficiency     HPI :     Demond Mcghee is a 80 y.o. male presented to   the hospital on 6/16/2022 with aspiration pneumonia    He has past medical history significant for chronic atrial fibrillation on Eliquis, aortic valve stenosis, chronic kidney disease stage III, history of COVID-19 pneumonia, rhabdomyolysis, severe protein calorie malnourishment. He is now admitted with aspiration pneumonia and is on antibiotics. He is also on isolation for COVID-19. Creatinine on arrival was 2.4. We are called for further management of renal insufficiency.     ROS:     Seen with-family in the room    positives in bold   Labs were obtained                All other remaining systems are negative or unable to obtain        PMH/PSH/SH/Family History:     Past Medical History:   Diagnosis Date    COVID-19 06/10/2022    [facility]       Past Surgical History:   Procedure Laterality Date    UPPER GASTROINTESTINAL ENDOSCOPY N/A 6/20/2022    EGD BIOPSY performed by Emily Piper MD at [place] [address]        reports that he has never smoked. He has never used smokeless tobacco. He reports previous alcohol use. He reports that he does not use drugs. family history is not on file. Medication:     Current Facility-Administered Medications: HYDROcodone-acetaminophen (NORCO) 5-325 MG per tablet 1 tablet, 1 tablet, Oral, Q6H PRN  pantoprazole (PROTONIX) tablet 40 mg, 40 mg, Oral, BID AC  potassium chloride (KLOR-CON M) extended release tablet 40 mEq, 40 mEq, Oral, PRN **OR** potassium bicarb-citric acid (EFFER-K) effervescent tablet 40 mEq, 40 mEq, Oral, PRN **OR** potassium chloride 10 mEq/100 mL IVPB (Peripheral Line), 10 mEq, IntraVENous, PRN  magnesium sulfate 1000 mg in dextrose 5% 100 mL IVPB, 1,000 mg, IntraVENous, PRN  melatonin disintegrating tablet 5 mg, 5 mg, Oral, Nightly  sodium chloride flush 0.9 % injection 10 mL, 10 mL, IntraVENous, 2 times per day  sodium chloride flush 0.9 % injection 10 mL, 10 mL, IntraVENous, PRN  0.9 % sodium chloride infusion, , IntraVENous, PRN  enoxaparin (LOVENOX) injection 40 mg, 40 mg, SubCUTAneous, Daily  ondansetron (ZOFRAN) injection 4 mg, 4 mg, IntraVENous, Q4H PRN  polyethylene glycol (GLYCOLAX) packet 17 g, 17 g, Oral, Daily PRN  acetaminophen (TYLENOL) tablet 650 mg, 650 mg, Oral, Q4H PRN **OR** acetaminophen (TYLENOL) suppository 650 mg, 650 mg, Rectal, Q4H PRN       Vitals :     Vitals:    07/01/22 0730   BP: 127/74   Pulse: 83   Resp: 20   Temp: 98 °F (36.7 °C)   SpO2: 96%       I & O :       Intake/Output Summary (Last 24 hours) at 7/1/2022 1019  Last data filed at 7/1/2022 0930  Gross per 24 hour   Intake 365 ml   Output 900 ml   Net -535 ml        Physical Examination :     Minimal exam was performed to minimize exposure to COVID-19. Patient was seen and evaluated from the door.   Preserving PPE       LABS:     Recent Labs     06/29/22  0734 06/30/22  0282 07/01/22  0417   WBC 8.1 7.9 6.3   HGB 10.6* 10.5* 10.9*   HCT 31.2* 31.4* 32.6*    301 336     Recent Labs     06/29/22  0734 06/30/22  0621 07/01/22 0417    138 139   K 3.7 4.3 4.7    106 103   CO2 22 23 23   BUN 18 19 19   CREATININE 1.6* 1.7* 1.6*   GLUCOSE 95 99 89      Nephrology  [address] [facility], [address]  Office: [phone]  Fax: [phone]

## 2022-07-01 NOTE — PROGRESS NOTES
Hospitalist Progress Note      PCP: Rm Saenz MD    Date of Admission: 6/16/2022    Chief Complaint: Aspiration Pneumonia    Hospital Course: H&P    Subjective:   Natha Do and examined at bedside. Patient alert, awake. Per staff patient tolerating minced and moist diet however needing one-to-one feeding assistance    Waiting for placement. No new issues        Medications:  Reviewed    Infusion Medications    sodium chloride 25 mL (06/23/22 0528)     Scheduled Medications    pantoprazole  40 mg Oral BID AC    melatonin  5 mg Oral Nightly    sodium chloride flush  10 mL IntraVENous 2 times per day    enoxaparin  40 mg SubCUTAneous Daily     PRN Meds: HYDROcodone 5 mg - acetaminophen, potassium chloride **OR** potassium alternative oral replacement **OR** potassium chloride, magnesium sulfate, sodium chloride flush, sodium chloride, ondansetron, polyethylene glycol, acetaminophen **OR** acetaminophen      Intake/Output Summary (Last 24 hours) at 7/1/2022 1620  Last data filed at 7/1/2022 1530  Gross per 24 hour   Intake 425 ml   Output 1100 ml   Net -675 ml       Physical Exam Performed:    /82   Pulse 82   Temp 97.9 °F (36.6 °C) (Oral)   Resp 20   Ht 5' 10\" (1.778 m)   Wt 169 lb 1.5 oz (76.7 kg)   SpO2 95%   BMI 24.26 kg/m²     General appearance: NAD, lying in bed  HEENT: Pupils equal, round. No JVD  Neck: Supple, with full range of motion. No jugular venous distention. Respiratory:  Normal respiratory effort. Clear to auscultation, bilaterally without Rales/Wheezes/Rhonchi. Cardiovascular: Regular rate and rhythm with normal S1/S2 without murmurs, rubs or gallops. Abdomen: Soft, non-tender, non-distended with normal bowel sounds. Musculoskeletal: No clubbing, cyanosis or edema bilaterally. Full range of motion without deformity. Skin: Skin color, texture, turgor normal.  No rashes or lesions. Neurologic:  [place].   grossly non-focal.  Psychiatric: Alert and awake  Capillary Refill: Brisk,3 seconds, normal   Peripheral Pulses: +2 palpable, equal bilaterally       Labs:   Recent Labs     06/29/22  0734 06/30/22  0621 07/01/22  0417   WBC 8.1 7.9 6.3   HGB 10.6* 10.5* 10.9*   HCT 31.2* 31.4* 32.6*    301 336     Recent Labs     06/29/22  0734 06/30/22  0621 07/01/22  0417    138 139   K 3.7 4.3 4.7    106 103   CO2 22 23 23   BUN 18 19 19   CREATININE 1.6* 1.7* 1.6*   CALCIUM 8.1* 8.2* 8.5     No results for input(s): AST, ALT, BILIDIR, BILITOT, ALKPHOS in the last 72 hours. No results for input(s): INR in the last 72 hours. No results for input(s): Jamaicanoah Pereira in the last 72 hours. Urinalysis:      Lab Results   Component Value Date/Time    NITRU Negative 06/20/2022 01:05 PM    [address] 06/20/2022 01:05 PM    RBCUA  06/20/2022 01:05 PM    BLOODU LARGE 06/20/2022 01:05 PM    SPECGRAV 1.025 06/20/2022 01:05 PM    GLUCOSEU Negative 06/20/2022 01:05 PM     Radiology:  Quillian Bones MODIFIED BARIUM SWALLOW W VIDEO   Final Result      1. Multilevel episodes of tracheal aspiration as detailed above. CT CHEST WO CONTRAST   Final Result   1. Right lower lobe bronchovascular crowding and airspace disease, small pneumonia with adjacent pleural thickening   2. Large hiatal hernia   3. Cardiomegaly with coronary artery calcification   4. Vertebral body fracture of L1 is age indeterminate. XR CHEST PORTABLE   Final Result   1. Cardiomegaly and aortic tortuosity   2. Minimal hazy density right lung base, differential radiolucency of the lungs is secondary to patient rotation   3. Hypertrophic osteophytes of the shoulders, left greater than right              Assessment/Plan:        Pneumonia, due to aspiration   - CT: Right lower lobe pneumonia  - Pxt has a large Hiatal hernia, and dementia, all which predispose him to recurrent aspirations.    - Pt tested positive for the COVID-19 virus in 06/09/2022, but did not appear to have been felt to have COVID pneumonia. Tested negative 6/18/2022  - Seen by SLP.  MBS showed multilevel aspiration  - Finished 7 days of Unasyn (6/16-6/23) followed by Augmentin (6/23-6-28)  - Can prob be weaned off O2 (% on 2L)  - Dysphagia diet;  Minced & Moist, with moderately Thick liquids  - If having recurrent aspirations, may need Surgery eval. in view of his large hiatal hernia; although he may not appear to be an ideal candidate clarence as will likely re-aspirate in view of the size of his hernia, placing him at high risk of recurrent aspiration, respiratory failure. Was seen by Dr. Marisel Leon: O/p f/u in 2 weeks. - Aspiration precautions, SLP follow up  - Pall Med was consulted re goals of care/code status        Acute respiratory failure with hypoxia:  Sec to aspiration pneumonia  - O2 sat was less than 90% on room air.    - Completed antibx for pneumonia  - Can prob be weaned off O2 (% on 2L)        Esophageal ulcer: POA  - Seen by GI. Had EGD: esophageal ulcer and biopsies obtained. Circumferential Brito's to 25cms   Pathology: chronic inflammation and reactive changes. - No intestinal metaplasia, dysplasia, or malignancy identified. - BID PPI x 8 weeks; then daily        Generalized weakness   Dementia  - Update B12, level; TSH: ordered. Pending.  - Supportive care        Acute metabolic encephalopathy   - Appears resolved, back to baseline  - Supportive care        CKD stage IIIb  He presented with a creatinine of 1.7. He has baseline creatinine between 2.5-1.5 since at least 2019. MARY ruled out  -Nephrology following        HTN-controlled inpatient without medication  - Continue to hold home Toprol for now        DVT Prophylaxis: Lovenox  Diet: ADULT DIET;  Dysphagia - Minced and Moist; Moderately Thick (Honey)  ADULT ORAL NUTRITION SUPPLEMENT; Breakfast, Lunch, Dinner; Frozen Oral Supplement  Code Status: Full Code    PT/OT Eval Status: ordered      Dispo/plan:  - Patient ready for discharge pending placement/precert    Reij Briseno MD

## 2022-07-01 NOTE — CARE COORDINATION
Case Management Assessment           Daily Note                 Date/ Time of Note: 7/1/2022 3:36 PM         Note completed by: Jaky Carter RN    Patient Name: Selwyn Lux  YOB: 1935    Diagnosis:Aspiration into airway, initial encounter [T17.908A]  Acute aspiration pneumonia ([address] [J69.0]  Patient Admission Status: Inpatient    Date of Admission:6/16/2022  3:05 PM Length of Stay: 15 GLOS: GMLOS: 5.4    Current Plan of Care: awaiting pre-cert for SNF for [facility] (had to be restarted 07/01/22 per admissions)  ________________________________________________________________________________________  PT AM-PAC: 7 / 24 per last evaluation on: 06.30.2022    OT AM-PAC: 10 / 24 per last evaluation on: 06.29.2022    DME Needs for discharge:   ________________________________________________________________________________________  Discharge Plan: SNF: [facility]    Tentative discharge date: 68/97/46 pending pre-cert    Current barriers to discharge: pre-cert for SNF    Referrals completed: Not Applicable    Resources/ information provided: Not indicated at this time  ________________________________________________________________________________________  Case Management Notes:  continues to follow for DC planning and needs, patient awaiting pre-cert for SNF admission at [facility], [address] spoke with Sai Mcintosh in admissions who reports pre-cert had been canceled out by prior facility and had to be restarted this AM for SNF admission. They are hoping to get it by end of day, but unsure if they will and may not get it until after the holiday weekend. Sai Mcintsoh ([phone])  will follow up with CM once she has pre-cert. Patient will need transport to SNF at [place]. Kevin Lara and his family were provided with choice of provider; he and his family are in agreement with the discharge plan.     Care Transition Patient: Georgie Carter RN  [facility]  Case Management Department  Ph: [phone]  Fax: [phone]

## 2022-07-01 NOTE — PROGRESS NOTES
Consulted for redness to buttocks and groin, R Heel red but blanchable. Wound Care orders placed for buttocks and groin, elevate heels off bed at all times. Wound Care to sign off. Please re-consult for changes or deterioration.

## 2022-07-02 LAB
ANION GAP SERPL CALCULATED.3IONS-SCNC: 10 MMOL/L (ref 3–16)
BASOPHILS ABSOLUTE: 0 K/UL (ref 0–0.2)
BASOPHILS RELATIVE PERCENT: 0.6 %
BUN BLDV-MCNC: 22 MG/DL (ref 7–20)
CALCIUM SERPL-MCNC: 8.4 MG/DL (ref 8.3–10.6)
CHLORIDE BLD-SCNC: 103 MMOL/L (ref 99–110)
CO2: 23 MMOL/L (ref 21–32)
CREAT SERPL-MCNC: 1.6 MG/DL (ref 0.8–1.3)
EOSINOPHILS ABSOLUTE: 0.1 K/UL (ref 0–0.6)
EOSINOPHILS RELATIVE PERCENT: 1.9 %
GFR AFRICAN AMERICAN: 50
GFR NON-AFRICAN AMERICAN: 41
GLUCOSE BLD-MCNC: 92 MG/DL (ref 70–99)
HCT VFR BLD CALC: 33.7 % (ref 40.5–52.5)
HCT VFR BLD CALC: 34.3 % (ref 40.5–52.5)
HEMOGLOBIN: 11.2 G/DL (ref 13.5–17.5)
HEMOGLOBIN: 11.4 G/DL (ref 13.5–17.5)
LYMPHOCYTES ABSOLUTE: 0.6 K/UL (ref 1–5.1)
LYMPHOCYTES RELATIVE PERCENT: 8.2 %
MCH RBC QN AUTO: 28.6 PG (ref 26–34)
MCH RBC QN AUTO: 29.3 PG (ref 26–34)
MCHC RBC AUTO-ENTMCNC: 33.2 G/DL (ref 31–36)
MCHC RBC AUTO-ENTMCNC: 33.3 G/DL (ref 31–36)
MCV RBC AUTO: 86.1 FL (ref 80–100)
MCV RBC AUTO: 88 FL (ref 80–100)
MONOCYTES ABSOLUTE: 0.5 K/UL (ref 0–1.3)
MONOCYTES RELATIVE PERCENT: 6.9 %
NEUTROPHILS ABSOLUTE: 6.1 K/UL (ref 1.7–7.7)
NEUTROPHILS RELATIVE PERCENT: 82.4 %
PDW BLD-RTO: 16.6 % (ref 12.4–15.4)
PDW BLD-RTO: 16.6 % (ref 12.4–15.4)
PLATELET # BLD: 325 K/UL (ref 135–450)
PLATELET # BLD: 340 K/UL (ref 135–450)
PMV BLD AUTO: 6.9 FL (ref 5–10.5)
PMV BLD AUTO: 7.4 FL (ref 5–10.5)
POTASSIUM REFLEX MAGNESIUM: 4.6 MMOL/L (ref 3.5–5.1)
RBC # BLD: 3.82 M/UL (ref 4.2–5.9)
RBC # BLD: 3.98 M/UL (ref 4.2–5.9)
SODIUM BLD-SCNC: 136 MMOL/L (ref 136–145)
WBC # BLD: 7.4 K/UL (ref 4–11)
WBC # BLD: 7.5 K/UL (ref 4–11)

## 2022-07-02 PROCEDURE — 6370000000 HC RX 637 (ALT 250 FOR IP): Performed by: INTERNAL MEDICINE

## 2022-07-02 PROCEDURE — 6360000002 HC RX W HCPCS: Performed by: INTERNAL MEDICINE

## 2022-07-02 PROCEDURE — 1200000000 HC SEMI PRIVATE

## 2022-07-02 PROCEDURE — 80048 BASIC METABOLIC PNL TOTAL CA: CPT

## 2022-07-02 PROCEDURE — 36415 COLL VENOUS BLD VENIPUNCTURE: CPT

## 2022-07-02 PROCEDURE — 2580000003 HC RX 258: Performed by: INTERNAL MEDICINE

## 2022-07-02 PROCEDURE — 85027 COMPLETE CBC AUTOMATED: CPT

## 2022-07-02 PROCEDURE — 85025 COMPLETE CBC W/AUTO DIFF WBC: CPT

## 2022-07-02 RX ADMIN — PANTOPRAZOLE SODIUM 40 MG: 40 TABLET, DELAYED RELEASE ORAL at 05:16

## 2022-07-02 RX ADMIN — SODIUM CHLORIDE, PRESERVATIVE FREE 10 ML: 5 INJECTION INTRAVENOUS at 21:11

## 2022-07-02 RX ADMIN — PANTOPRAZOLE SODIUM 40 MG: 40 TABLET, DELAYED RELEASE ORAL at 19:26

## 2022-07-02 RX ADMIN — SODIUM CHLORIDE, PRESERVATIVE FREE 10 ML: 5 INJECTION INTRAVENOUS at 08:24

## 2022-07-02 RX ADMIN — Medication 5 MG: at 21:11

## 2022-07-02 RX ADMIN — ENOXAPARIN SODIUM 40 MG: 100 INJECTION SUBCUTANEOUS at 08:24

## 2022-07-02 ASSESSMENT — PAIN SCALES - GENERAL
PAINLEVEL_OUTOF10: 0

## 2022-07-02 ASSESSMENT — PAIN SCALES - WONG BAKER
WONGBAKER_NUMERICALRESPONSE: 0

## 2022-07-02 NOTE — PLAN OF CARE
Problem: ABCDS Injury Assessment  Goal: Absence of physical injury  Outcome: Adequate for Discharge     Problem: Respiratory - Adult  Goal: Achieves optimal ventilation and oxygenation  Outcome: Adequate for Discharge     Problem: Discharge Planning  Goal: Discharge to home or other facility with appropriate resources  Outcome: Adequate for Discharge     Problem: Pain  Goal: Verbalizes/displays adequate comfort level or baseline comfort level  Outcome: Adequate for Discharge     Problem: Safety - Adult  Goal: Free from fall injury  Outcome: Adequate for Discharge     Problem: Skin/Tissue Integrity  Goal: Absence of new skin breakdown  Description: 1. Monitor for areas of redness and/or skin breakdown  2. Assess vascular access sites hourly  3. Every 4-6 hours minimum:  Change oxygen saturation probe site  4. Every 4-6 hours:  If on nasal continuous positive airway pressure, respiratory therapy assess nares and determine need for appliance change or resting period.   Outcome: Adequate for Discharge     Problem: Nutrition Deficit:  Goal: Optimize nutritional status  Outcome: Adequate for Discharge

## 2022-07-02 NOTE — PROGRESS NOTES
Interval History and plan:      BP is well controlled   Stable creatinine 1.6  Urine is 1250 ml    Plan:    Encourage oral intake  He is in stage IIIb chronic kidney disease since 2019. Creatinine is currently near baseline. He is off BP medicines   Will follow as out patient on DC  No changes today                    Assessment :     Stage IIIb chronic kidney disease: He presented with a creatinine of 1.7. He has baseline creatinine fluctuates between 2.5-1.5. He has kidney disease since 2019. He has 800 mg / day of proteinuria   Free light chain ratio is 2.3 acceptable for CKD     Anemia: Hemoglobin is 10.5 and there is no need for Procrit. Aspiration pneumonia/COVID-19: CT chest shows right lower lobe crowding and airspace disease. [facility] would like to thank Candida Isaacs MD   for opportunity to serve this patient      Please call with questions at-   24 Hrs Answering service [phone] or  7 am- 5 pm via Perfect serve or cell phone  Atiya Starkey MD          CC/reason for consult :     Renal insufficiency     HPI :     Tori Hernandez is a 80 y.o. male presented to   the hospital on 6/16/2022 with aspiration pneumonia    He has past medical history significant for chronic atrial fibrillation on Eliquis, aortic valve stenosis, chronic kidney disease stage III, history of COVID-19 pneumonia, rhabdomyolysis, severe protein calorie malnourishment. He is now admitted with aspiration pneumonia and is on antibiotics. He is also on isolation for COVID-19. Creatinine on arrival was 2.4. We are called for further management of renal insufficiency.     ROS:     Seen with-family in the room    positives in bold   Labs were obtained                All other remaining systems are negative or unable to obtain        PMH/PSH/SH/Family History:     Past Medical History:   Diagnosis Date    COVID-19 06/10/2022    [facility]       Past Surgical History:   Procedure Laterality Date    UPPER GASTROINTESTINAL ENDOSCOPY N/A 6/20/2022    EGD BIOPSY performed by Perfecto Marrero MD at [facility]        reports that he has never smoked. He has never used smokeless tobacco. He reports previous alcohol use. He reports that he does not use drugs. family history is not on file. Medication:     Current Facility-Administered Medications: HYDROcodone-acetaminophen (NORCO) 5-325 MG per tablet 1 tablet, 1 tablet, Oral, Q6H PRN  pantoprazole (PROTONIX) tablet 40 mg, 40 mg, Oral, BID AC  potassium chloride (KLOR-CON M) extended release tablet 40 mEq, 40 mEq, Oral, PRN **OR** potassium bicarb-citric acid (EFFER-K) effervescent tablet 40 mEq, 40 mEq, Oral, PRN **OR** potassium chloride 10 mEq/100 mL IVPB (Peripheral Line), 10 mEq, IntraVENous, PRN  magnesium sulfate 1000 mg in dextrose 5% 100 mL IVPB, 1,000 mg, IntraVENous, PRN  melatonin disintegrating tablet 5 mg, 5 mg, Oral, Nightly  sodium chloride flush 0.9 % injection 10 mL, 10 mL, IntraVENous, 2 times per day  sodium chloride flush 0.9 % injection 10 mL, 10 mL, IntraVENous, PRN  0.9 % sodium chloride infusion, , IntraVENous, PRN  enoxaparin (LOVENOX) injection 40 mg, 40 mg, SubCUTAneous, Daily  ondansetron (ZOFRAN) injection 4 mg, 4 mg, IntraVENous, Q4H PRN  polyethylene glycol (GLYCOLAX) packet 17 g, 17 g, Oral, Daily PRN  acetaminophen (TYLENOL) tablet 650 mg, 650 mg, Oral, Q4H PRN **OR** acetaminophen (TYLENOL) suppository 650 mg, 650 mg, Rectal, Q4H PRN       Vitals :     Vitals:    07/02/22 0756   BP: (!) 143/75   Pulse: 87   Resp: 16   Temp: 98.2 °F (36.8 °C)   SpO2: 97%       I & O :       Intake/Output Summary (Last 24 hours) at 7/2/2022 0815  Last data filed at 7/2/2022 0649  Gross per 24 hour   Intake 180 ml   Output 1150 ml   Net -970 ml        Physical Examination :     Minimal exam was performed to minimize exposure to COVID-19. Patient was seen and evaluated from the door.   Preserving PPE       LABS:     Recent Labs

## 2022-07-02 NOTE — PROGRESS NOTES
Pt in bed pending breakfast/ total feed / dysphagia/ thick liquid diet/ pt follows some commands / Hx of dementia/ pt cooperative / calm for now/ pending pre-cert for long term placement/ ready for DC once that is obtained/ cont to assist pt with ADLs/ see notes/ flow sheet

## 2022-07-02 NOTE — FLOWSHEET NOTE
07/01/22 2049   Assessment   Charting Type Shift assessment   Psychosocial   Psychosocial (WDL) X   Patient Behaviors Agitated   Neurological   Neuro (WDL) X   Level of Consciousness Alert (0)   Orientation Level Oriented to person;Disoriented to place; Disoriented to time;Disoriented to situation   Cognition Poor attention/concentration; Short term memory loss   Speech Clear   R Hand  Moderate   L Hand  Moderate   L Foot Dorsiflexion Weak   R Foot Plantar Flexion Weak   L Foot Plantar Flexion Weak   RUE Sensation  Full sensation   LUE Sensation  Full sensation   RLE Sensation  Full sensation   LLE Sensation  Full sensation   [place] Coma Scale   Eye Opening 4   Best Verbal Response 4   Best Motor Response 5   [place] Coma Scale Score 13   HEENT (Head, Ears, Eyes, Nose, & Throat)   HEENT (WDL) X   Right Eye Impaired vision   Left Eye Impaired vision   Right Ear Impaired hearing   Left Ear Impaired hearing   Mucous Membrane Dry   Teeth Missing teeth   Respiratory   Respiratory (WDL) X   Respiratory Pattern Regular   Respiratory Depth Normal   Respiratory Quality/Effort Unlabored   Chest Assessment Chest expansion symmetrical;Trachea midline   L Breath Sounds Clear;Diminished   R Breath Sounds Clear;Diminished   Breath Sounds   Right Upper Lobe Clear   Right Middle Lobe Diminished   Right Lower Lobe Diminished   Left Upper Lobe Clear   Left Lower Lobe Diminished   Cardiac   Cardiac (WDL) X   Cardiac Regularity Irregular   Heart Sounds Murmur   Cardiac Monitor   Telemetry Monitor Alarm Parameters   (no telemetry orders)   Gastrointestinal   Abdominal (WDL) X   Last BM (including prior to admit) 07/01/22   Abdomen Inspection Flat   RUQ Bowel Sounds Hypoactive   LUQ Bowel Sounds Hypoactive   RLQ Bowel Sounds Hypoactive   LLQ Bowel Sounds Hypoactive   GI Symptoms Loss of appetite   Tenderness Soft; No guarding   Genitourinary   Genitourinary (WDL) X  (foely )   Suprapubic Tenderness No   Dysuria (Pain/Burning w/Urination) YASH   Difficulty Urinating/[place] YASH   Urine Frequency   Urine Frequency YASH   Urine Urgency   Urine Urgency YASH   Urinary Retention   Urinary Retention Present   Urine Assessment   Urinary Status Parker   Peripheral Vascular   Peripheral Vascular (WDL) X   Edema None   RLE Neurovascular Assessment   R Pedal Pulse +1   LLE Neurovascular Assessment   L Pedal Pulse +1   Skin Integumentary    Skin Integumentary (WDL) X   Skin Color Ecchymosis (comment)   Skin Condition/Temp Dry;Fragile; Warm   Skin Integrity Abrasion;Ecchymosis   Location of Wound Prevention Buttocks   Wound Offloading (Prevention Methods) Bed, pressure redistribution/air;Pillows;Repositioning   Dressing Site Other (comment)  (carol rectal )   Treatment Pharmaceutical   Date Applied 07/01/22   Assessed This Shift Red;Moist   Care Plan - Skin/Tissue Integrity Goals   Skin Integrity Remains Intact Monitor for areas of redness and/or skin breakdown   Musculoskeletal   Musculoskeletal (WDL) X   RUE Weakness   LUE Weakness   RL Extremity Limited movement;Weakness   LL Extremity Limited movement;Weakness   Urinary Catheter   Placement Date/Time: 06/20/22 1140   Insertion attempts: 1  Catheter Size: 16 FR  Catheter Balloon Size: 10 mL  Insertion Procedure Practices: Insertion date placed on parker drainage bag;Properly inflated balloon;Securement device used;Sterlie gloves;. .. Catheter Care Completed Yes   Catheter Best Practices  Drainage tube clipped to bed;Catheter secured to thigh; Bag below bladder;Bag not on floor; Lack of dependent loop in tubing;Drainage bag less than half full   Status Draining   Care Plan - Discharge Planning Goals   Discharge to home or other facility with appropriate resources Identify barriers to discharge with patient and caregiver   Treatment Team Notification   Lips Dry   Handoff   Communication Given Shift Handoff   Handoff Given To [address] [place] [address] Received From [facility] Communication Face to Face;At bedside   Time Handoff Given 1911   End of Shift Check Performed No

## 2022-07-02 NOTE — PROGRESS NOTES
Hospitalist Progress Note      PCP: Amy Nguyen MD    Date of Admission: 6/16/2022    Chief Complaint: Aspiration Pneumonia    Hospital Course: H&P    Subjective:   Victorine Argenta and examined at bedside. Patient alert, awake. Per staff patient tolerating minced and moist diet however needing one-to-one feeding assistance    Waiting for placement. No new issues        Medications:  Reviewed    Infusion Medications    sodium chloride 25 mL (06/23/22 0528)     Scheduled Medications    pantoprazole  40 mg Oral BID AC    melatonin  5 mg Oral Nightly    sodium chloride flush  10 mL IntraVENous 2 times per day    enoxaparin  40 mg SubCUTAneous Daily     PRN Meds: HYDROcodone 5 mg - acetaminophen, potassium chloride **OR** potassium alternative oral replacement **OR** potassium chloride, magnesium sulfate, sodium chloride flush, sodium chloride, ondansetron, polyethylene glycol, acetaminophen **OR** acetaminophen      Intake/Output Summary (Last 24 hours) at 7/2/2022 0901  Last data filed at 7/2/2022 0649  Gross per 24 hour   Intake 180 ml   Output 1150 ml   Net -970 ml       Physical Exam Performed:    BP (!) 143/75   Pulse 87   Temp 98.2 °F (36.8 °C) (Oral)   Resp 16   Ht 5' 10\" (1.778 m)   Wt 169 lb 5 oz (76.8 kg)   SpO2 97%   BMI 24.29 kg/m²     General appearance: NAD, lying in bed  HEENT: Pupils equal, round. No JVD  Neck: Supple, with full range of motion. No jugular venous distention. Respiratory:  Normal respiratory effort. Clear to auscultation, bilaterally without Rales/Wheezes/Rhonchi. Cardiovascular: Regular rate and rhythm with normal S1/S2 without murmurs, rubs or gallops. Abdomen: Soft, non-tender, non-distended with normal bowel sounds. Musculoskeletal: No clubbing, cyanosis or edema bilaterally. Full range of motion without deformity. Skin: Skin color, texture, turgor normal.  No rashes or lesions. Neurologic:  [place].   grossly non-focal.  Psychiatric: Alert and have COVID pneumonia. Tested negative 6/18/2022  - Seen by SLP.  MBS showed multilevel aspiration  - Finished 7 days of Unasyn (6/16-6/23) followed by Augmentin (6/23-6-28)  - Can prob be weaned off O2 (% on 2L)  - Dysphagia diet;  Minced & Moist, with moderately Thick liquids  - If having recurrent aspirations, may need Surgery eval. in view of his large hiatal hernia; although he may not appear to be an ideal candidate clarence as will likely re-aspirate in view of the size of his hernia, placing him at high risk of recurrent aspiration, respiratory failure. Was seen by Dr. Gama Gutiérrez: O/p f/u in 2 weeks. - Aspiration precautions, SLP follow up  - Pall Med was consulted re goals of care/code status        Acute respiratory failure with hypoxia:  Sec to aspiration pneumonia  - O2 sat was less than 90% on room air.    - Completed antibx for pneumonia  - Can prob be weaned off O2 (% on 2L)        Esophageal ulcer: POA  - Seen by GI. Had EGD: esophageal ulcer and biopsies obtained. Circumferential Brito's to 25cms   Pathology: chronic inflammation and reactive changes. - No intestinal metaplasia, dysplasia, or malignancy identified. - BID PPI x 8 weeks; then daily        Generalized weakness   Dementia  - Update B12, level; TSH: ordered. Pending.  - Supportive care        Acute metabolic encephalopathy   - Appears resolved, back to baseline  - Supportive care        CKD stage IIIb  He presented with a creatinine of 1.7. He has baseline creatinine between 2.5-1.5 since at least 2019. MARY ruled out  -Nephrology following        HTN-controlled inpatient without medication  - Continue to hold home Toprol for now        DVT Prophylaxis: Lovenox  Diet: ADULT DIET;  Dysphagia - Minced and Moist; Moderately Thick (Honey)  ADULT ORAL NUTRITION SUPPLEMENT; Breakfast, Lunch, Dinner; Frozen Oral Supplement  Code Status: Full Code    PT/OT Eval Status: ordered      Dispo/plan:  - Patient ready for discharge pending placement/precert  - Will not need to have daily labs    Daniel Avery MD

## 2022-07-02 NOTE — CARE COORDINATION
CM spoke with Angel Cowan at [facility]. They do not have pre-cert yet. She will call if they receive it.       Karina Velásquez RN, BSN,   4th Floor Progressive Care Unit  [phone]

## 2022-07-03 PROCEDURE — 6370000000 HC RX 637 (ALT 250 FOR IP): Performed by: INTERNAL MEDICINE

## 2022-07-03 PROCEDURE — 6360000002 HC RX W HCPCS: Performed by: INTERNAL MEDICINE

## 2022-07-03 PROCEDURE — 2580000003 HC RX 258: Performed by: INTERNAL MEDICINE

## 2022-07-03 PROCEDURE — 1200000000 HC SEMI PRIVATE

## 2022-07-03 RX ORDER — GUAIFENESIN/DEXTROMETHORPHAN 100-10MG/5
5 SYRUP ORAL EVERY 4 HOURS PRN
Status: DISCONTINUED | OUTPATIENT
Start: 2022-07-03 | End: 2022-07-05 | Stop reason: HOSPADM

## 2022-07-03 RX ADMIN — Medication 5 MG: at 21:31

## 2022-07-03 RX ADMIN — ENOXAPARIN SODIUM 40 MG: 100 INJECTION SUBCUTANEOUS at 10:01

## 2022-07-03 RX ADMIN — SODIUM CHLORIDE, PRESERVATIVE FREE 10 ML: 5 INJECTION INTRAVENOUS at 10:02

## 2022-07-03 ASSESSMENT — PAIN SCALES - GENERAL
PAINLEVEL_OUTOF10: 0

## 2022-07-03 NOTE — PROGRESS NOTES
Hospitalist Progress Note      PCP: Rene Soliz MD    Date of Admission: 6/16/2022    Chief Complaint: Aspiration Pneumonia    Hospital Course: H&P    Subjective:   Auguste Legions and examined at bedside. Patient alert, awake. Per staff patient tolerating minced and moist diet however needing one-to-one feeding assistance    Waiting for placement. No new issues    Family at bedside        Medications:  Reviewed    Infusion Medications    sodium chloride 25 mL (06/23/22 0528)     Scheduled Medications    pantoprazole  40 mg Oral BID AC    melatonin  5 mg Oral Nightly    sodium chloride flush  10 mL IntraVENous 2 times per day    enoxaparin  40 mg SubCUTAneous Daily     PRN Meds: HYDROcodone 5 mg - acetaminophen, potassium chloride **OR** potassium alternative oral replacement **OR** potassium chloride, magnesium sulfate, sodium chloride flush, sodium chloride, ondansetron, polyethylene glycol, acetaminophen **OR** acetaminophen      Intake/Output Summary (Last 24 hours) at 7/3/2022 0949  Last data filed at 7/3/2022 0425  Gross per 24 hour   Intake 290 ml   Output 1550 ml   Net -1260 ml       Physical Exam Performed:    /82   Pulse 92   Temp 97.6 °F (36.4 °C) (Oral)   Resp 18   Ht 5' 10\" (1.778 m)   Wt 171 lb 15.3 oz (78 kg)   SpO2 98%   BMI 24.67 kg/m²     General appearance: NAD, lying in bed  HEENT: Pupils equal, round. No JVD  Neck: Supple, with full range of motion. No jugular venous distention. Respiratory:  Normal respiratory effort. Clear to auscultation, bilaterally without Rales/Wheezes/Rhonchi. Cardiovascular: Regular rate and rhythm with normal S1/S2 without murmurs, rubs or gallops. Abdomen: Soft, non-tender, non-distended with normal bowel sounds. Musculoskeletal: No clubbing, cyanosis or edema bilaterally. Full range of motion without deformity. Skin: Skin color, texture, turgor normal.  No rashes or lesions. Neurologic:  [place].   grossly non-focal.  Psychiatric: Alert and awake  Capillary Refill: Brisk,3 seconds, normal   Peripheral Pulses: +2 palpable, equal bilaterally       Labs:   Recent Labs     07/01/22 0417 07/02/22 0417 07/02/22 2128   WBC 6.3 7.4 7.5   HGB 10.9* 11.2* 11.4*   HCT 32.6* 33.7* 34.3*    325 340     Recent Labs     07/01/22 0417 07/02/22 0417    136   K 4.7 4.6    103   CO2 23 23   BUN 19 22*   CREATININE 1.6* 1.6*   CALCIUM 8.5 8.4     No results for input(s): AST, ALT, BILIDIR, BILITOT, ALKPHOS in the last 72 hours. No results for input(s): INR in the last 72 hours. No results for input(s): Nataly Imer in the last 72 hours. Urinalysis:      Lab Results   Component Value Date/Time    NITRU Negative 06/20/2022 01:05 PM    [address] 0[address]2 06/20/2022 01:05 PM    RBCUA  06/20/2022 01:05 PM    BLOODU LARGE 06/20/2022 01:05 PM    SPECGRAV 1.025 06/20/2022 01:05 PM    GLUCOSEU Negative 06/20/2022 01:05 PM     Radiology:  [place] MODIFIED BARIUM SWALLOW W VIDEO   Final Result      1. Multilevel episodes of tracheal aspiration as detailed above. CT CHEST WO CONTRAST   Final Result   1. Right lower lobe bronchovascular crowding and airspace disease, small pneumonia with adjacent pleural thickening   2. Large hiatal hernia   3. Cardiomegaly with coronary artery calcification   4. Vertebral body fracture of L1 is age indeterminate. XR CHEST PORTABLE   Final Result   1. Cardiomegaly and aortic tortuosity   2. Minimal hazy density right lung base, differential radiolucency of the lungs is secondary to patient rotation   3. Hypertrophic osteophytes of the shoulders, left greater than right              Assessment/Plan:        Pneumonia, due to aspiration   - CT: Right lower lobe pneumonia  - Pxt has a large Hiatal hernia, and dementia, all which predispose him to recurrent aspirations. - Pt tested positive for the COVID-19 virus in 06/09/2022, but did not appear to have been felt to have COVID pneumonia.  Tested negative 6/18/2022  - Seen by SLP.  MBS showed multilevel aspiration  - Finished 7 days of Unasyn (6/16-6/23) followed by Augmentin (6/23-6-28)  - Can prob be weaned off O2 (% on 2L)  - Dysphagia diet;  Minced & Moist, with moderately Thick liquids  - If having recurrent aspirations, may need Surgery eval. in view of his large hiatal hernia; although he may not appear to be an ideal candidate clarence as will likely re-aspirate in view of the size of his hernia, placing him at high risk of recurrent aspiration, respiratory failure. Was seen by Dr. Jaswinder Sainz: O/p f/u in 2 weeks. - Aspiration precautions, SLP follow up  - Pall Med was consulted re goals of care/code status        Acute respiratory failure with hypoxia:  Sec to aspiration pneumonia  - O2 sat was less than 90% on room air.    - Completed antibx for pneumonia  - Can prob be weaned off O2 (% on 2L)        Esophageal ulcer: POA  - Seen by GI. Had EGD: esophageal ulcer and biopsies obtained. Circumferential Brito's to 25cms   Pathology: chronic inflammation and reactive changes. - No intestinal metaplasia, dysplasia, or malignancy identified. - BID PPI x 8 weeks; then daily        Generalized weakness   Dementia  - Update B12, level; TSH: ordered. Pending.  - Supportive care        Acute metabolic encephalopathy   - Appears resolved, back to baseline  - Supportive care        CKD stage IIIb  He presented with a creatinine of 1.7. He has baseline creatinine between 2.5-1.5 since at least 2019. MARY ruled out  -Nephrology following        HTN-controlled inpatient without medication  - Continue to hold home Toprol for now        DVT Prophylaxis: Lovenox  Diet: ADULT DIET;  Dysphagia - Minced and Moist; Moderately Thick (Honey)  ADULT ORAL NUTRITION SUPPLEMENT; Breakfast, Lunch, Dinner; Frozen Oral Supplement  Code Status: Full Code    PT/OT Eval Status: ordered      Dispo/plan:  - Patient ready for discharge pending placement/precert  - Will not need to have daily labs    Latrell Warren MD

## 2022-07-03 NOTE — PLAN OF CARE
Problem: ABCDS Injury Assessment  Goal: Absence of physical injury  Outcome: Progressing     Problem: Respiratory - Adult  Goal: Achieves optimal ventilation and oxygenation  Outcome: Progressing     Problem: Discharge Planning  Goal: Discharge to home or other facility with appropriate resources  Outcome: Progressing     Problem: Safety - Adult  Goal: Free from fall injury  Outcome: Progressing     Problem: Skin/Tissue Integrity  Goal: Absence of new skin breakdown  Description: 1. Monitor for areas of redness and/or skin breakdown  2. Assess vascular access sites hourly  3. Every 4-6 hours minimum:  Change oxygen saturation probe site  4. Every 4-6 hours:  If on nasal continuous positive airway pressure, respiratory therapy assess nares and determine need for appliance change or resting period.   Outcome: Progressing

## 2022-07-03 NOTE — PLAN OF CARE
Problem: ABCDS Injury Assessment  Goal: Absence of physical injury  7/3/2022 0132 by Nona Winston RN  Outcome: Progressing  Flowsheets (Taken 7/2/2022 2146)  Absence of Physical Injury: Implement safety measures based on patient assessment  7/2/2022 1529 by Justina Carlos RN  Outcome: Adequate for Discharge     Problem: Respiratory - Adult  Goal: Achieves optimal ventilation and oxygenation  7/3/2022 0132 by Nona Winston RN  Outcome: Progressing  7/2/2022 1529 by Justina Carlos RN  Outcome: Adequate for Discharge     Problem: Discharge Planning  Goal: Discharge to home or other facility with appropriate resources  7/3/2022 0132 by Nona Winston RN  Outcome: Progressing  7/2/2022 1529 by Justina Carlos RN  Outcome: Adequate for Discharge     Problem: Pain  Goal: Verbalizes/displays adequate comfort level or baseline comfort level  7/3/2022 0132 by Nona Winston RN  Outcome: Progressing  7/2/2022 1529 by Justina Carlos RN  Outcome: Adequate for Discharge     Problem: Safety - Adult  Goal: Free from fall injury  7/3/2022 0132 by Nona Winston RN  Outcome: Progressing  Flowsheets (Taken 7/2/2022 2146)  Free From Fall Injury:   Instruct family/caregiver on patient safety   Based on caregiver fall risk screen, instruct family/caregiver to ask for assistance with transferring infant if caregiver noted to have fall risk factors  7/2/2022 1529 by Justina Carlos RN  Outcome: Adequate for Discharge     Problem: Skin/Tissue Integrity  Goal: Absence of new skin breakdown  Description: 1. Monitor for areas of redness and/or skin breakdown  2. Assess vascular access sites hourly  3. Every 4-6 hours minimum:  Change oxygen saturation probe site  4. Every 4-6 hours:  If on nasal continuous positive airway pressure, respiratory therapy assess nares and determine need for appliance change or resting period.   7/3/2022 0132 by Nona Winston RN  Outcome: Progressing  7/2/2022 1529 by Justina Carlos RN  Outcome: Adequate for Discharge     Problem: Nutrition Deficit:  Goal: Optimize nutritional status  7/3/2022 0132 by Jenniffer Nelson RN  Outcome: Progressing  7/2/2022 1529 by Candie Mueller RN  Outcome: Adequate for Discharge

## 2022-07-03 NOTE — PROGRESS NOTES
Interval History and plan:      BP is well controlled   Stable creatinine 1.6  Urine is 1550 ml    Plan:    Encourage oral intake  He is in stage IIIb chronic kidney disease since 2019. Creatinine is currently near baseline. He is off BP medicines   Will follow as out patient on DC  No changes today                    Assessment :     Stage IIIb chronic kidney disease: He presented with a creatinine of 1.7. He has baseline creatinine fluctuates between 2.5-1.5. He has kidney disease since 2019. He has 800 mg / day of proteinuria   Free light chain ratio is 2.3 acceptable for CKD     Anemia: Hemoglobin is 10.5 and there is no need for Procrit. Aspiration pneumonia/COVID-19: CT chest shows right lower lobe crowding and airspace disease. [facility] would like to thank Criss Sandoval MD   for opportunity to serve this patient      Please call with questions at-   24 Hrs Answering service [phone] or  7 am- 5 pm via Perfect serve or cell phone  Wil Pablo MD          CC/reason for consult :     Renal insufficiency     HPI :     Yadiel Charles is a 80 y.o. male presented to   the hospital on 6/16/2022 with aspiration pneumonia    He has past medical history significant for chronic atrial fibrillation on Eliquis, aortic valve stenosis, chronic kidney disease stage III, history of COVID-19 pneumonia, rhabdomyolysis, severe protein calorie malnourishment. He is now admitted with aspiration pneumonia and is on antibiotics. He is also on isolation for COVID-19. Creatinine on arrival was 2.4. We are called for further management of renal insufficiency.     ROS:     Seen with-family in the room    positives in bold   Labs were obtained                All other remaining systems are negative or unable to obtain        PMH/PSH/SH/Family History:     Past Medical History:   Diagnosis Date    COVID-19 06/10/2022    [facility]       Past Surgical History:   Procedure Laterality Date    UPPER GASTROINTESTINAL ENDOSCOPY N/A 6/20/2022    EGD BIOPSY performed by Christina Martinez MD at [place] [address]        reports that he has never smoked. He has never used smokeless tobacco. He reports previous alcohol use. He reports that he does not use drugs. family history is not on file. Medication:     Current Facility-Administered Medications: HYDROcodone-acetaminophen (NORCO) 5-325 MG per tablet 1 tablet, 1 tablet, Oral, Q6H PRN  pantoprazole (PROTONIX) tablet 40 mg, 40 mg, Oral, BID AC  potassium chloride (KLOR-CON M) extended release tablet 40 mEq, 40 mEq, Oral, PRN **OR** potassium bicarb-citric acid (EFFER-K) effervescent tablet 40 mEq, 40 mEq, Oral, PRN **OR** potassium chloride 10 mEq/100 mL IVPB (Peripheral Line), 10 mEq, IntraVENous, PRN  magnesium sulfate 1000 mg in dextrose 5% 100 mL IVPB, 1,000 mg, IntraVENous, PRN  melatonin disintegrating tablet 5 mg, 5 mg, Oral, Nightly  sodium chloride flush 0.9 % injection 10 mL, 10 mL, IntraVENous, 2 times per day  sodium chloride flush 0.9 % injection 10 mL, 10 mL, IntraVENous, PRN  0.9 % sodium chloride infusion, , IntraVENous, PRN  enoxaparin (LOVENOX) injection 40 mg, 40 mg, SubCUTAneous, Daily  ondansetron (ZOFRAN) injection 4 mg, 4 mg, IntraVENous, Q4H PRN  polyethylene glycol (GLYCOLAX) packet 17 g, 17 g, Oral, Daily PRN  acetaminophen (TYLENOL) tablet 650 mg, 650 mg, Oral, Q4H PRN **OR** acetaminophen (TYLENOL) suppository 650 mg, 650 mg, Rectal, Q4H PRN       Vitals :     Vitals:    07/03/22 0425   BP: 113/82   Pulse: 92   Resp: 18   Temp: 97.6 °F (36.4 °C)   SpO2: 98%       I & O :       Intake/Output Summary (Last 24 hours) at 7/3/2022 1017  Last data filed at 7/3/2022 0425  Gross per 24 hour   Intake 290 ml   Output 1550 ml   Net -1260 ml        Physical Examination :     Minimal exam was performed to minimize exposure to COVID-19. Patient was seen and evaluated from the door.   Preserving PPE       LABS:     Recent Labs 07/01/22 0417 07/02/22 0417 07/02/22 2128   WBC 6.3 7.4 7.5   HGB 10.9* 11.2* 11.4*   HCT 32.6* 33.7* 34.3*    325 340     Recent Labs     07/01/22 0417 07/02/22 0417    136   K 4.7 4.6    103   CO2 23 23   BUN 19 22*   CREATININE 1.6* 1.6*   GLUCOSE 89 92      Nephrology  [address] [place] [address] [address]  Office: [phone]  Fax: [phone]

## 2022-07-04 LAB
ANION GAP SERPL CALCULATED.3IONS-SCNC: 8 MMOL/L (ref 3–16)
BUN BLDV-MCNC: 18 MG/DL (ref 7–20)
CALCIUM SERPL-MCNC: 8.1 MG/DL (ref 8.3–10.6)
CHLORIDE BLD-SCNC: 102 MMOL/L (ref 99–110)
CO2: 24 MMOL/L (ref 21–32)
CREAT SERPL-MCNC: 1.4 MG/DL (ref 0.8–1.3)
GFR AFRICAN AMERICAN: 58
GFR NON-AFRICAN AMERICAN: 48
GLUCOSE BLD-MCNC: 88 MG/DL (ref 70–99)
HCT VFR BLD CALC: 36.5 % (ref 40.5–52.5)
HEMOGLOBIN: 12.1 G/DL (ref 13.5–17.5)
MCH RBC QN AUTO: 29.9 PG (ref 26–34)
MCHC RBC AUTO-ENTMCNC: 33.3 G/DL (ref 31–36)
MCV RBC AUTO: 90 FL (ref 80–100)
PDW BLD-RTO: 17.9 % (ref 12.4–15.4)
PLATELET # BLD: 364 K/UL (ref 135–450)
PMV BLD AUTO: 8.1 FL (ref 5–10.5)
POTASSIUM REFLEX MAGNESIUM: 5.5 MMOL/L (ref 3.5–5.1)
RBC # BLD: 4.05 M/UL (ref 4.2–5.9)
SODIUM BLD-SCNC: 134 MMOL/L (ref 136–145)
WBC # BLD: 8.5 K/UL (ref 4–11)

## 2022-07-04 PROCEDURE — 6370000000 HC RX 637 (ALT 250 FOR IP): Performed by: INTERNAL MEDICINE

## 2022-07-04 PROCEDURE — 80048 BASIC METABOLIC PNL TOTAL CA: CPT

## 2022-07-04 PROCEDURE — 1200000000 HC SEMI PRIVATE

## 2022-07-04 PROCEDURE — 6360000002 HC RX W HCPCS: Performed by: INTERNAL MEDICINE

## 2022-07-04 PROCEDURE — 85027 COMPLETE CBC AUTOMATED: CPT

## 2022-07-04 PROCEDURE — 36415 COLL VENOUS BLD VENIPUNCTURE: CPT

## 2022-07-04 PROCEDURE — 2580000003 HC RX 258: Performed by: INTERNAL MEDICINE

## 2022-07-04 RX ADMIN — SODIUM CHLORIDE, PRESERVATIVE FREE 10 ML: 5 INJECTION INTRAVENOUS at 08:30

## 2022-07-04 RX ADMIN — Medication 5 MG: at 22:02

## 2022-07-04 RX ADMIN — SODIUM CHLORIDE, PRESERVATIVE FREE 10 ML: 5 INJECTION INTRAVENOUS at 00:16

## 2022-07-04 RX ADMIN — ENOXAPARIN SODIUM 40 MG: 100 INJECTION SUBCUTANEOUS at 08:29

## 2022-07-04 RX ADMIN — SODIUM CHLORIDE, PRESERVATIVE FREE 10 ML: 5 INJECTION INTRAVENOUS at 21:11

## 2022-07-04 NOTE — PROGRESS NOTES
Hospitalist Progress Note      PCP: Gerald Dawkins MD    Date of Admission: 6/16/2022    Chief Complaint: Aspiration Pneumonia    Hospital Course: H&P    Subjective:   Aria Baker and examined at bedside. Patient alert, awake. Per staff patient tolerating minced and moist diet however needing one-to-one feeding assistance    Waiting for placement. No new issues    Medications:  Reviewed    Infusion Medications    sodium chloride 25 mL (06/23/22 0528)     Scheduled Medications    pantoprazole  40 mg Oral BID AC    melatonin  5 mg Oral Nightly    sodium chloride flush  10 mL IntraVENous 2 times per day    enoxaparin  40 mg SubCUTAneous Daily     PRN Meds: guaiFENesin-dextromethorphan, HYDROcodone 5 mg - acetaminophen, potassium chloride **OR** potassium alternative oral replacement **OR** potassium chloride, magnesium sulfate, sodium chloride flush, sodium chloride, ondansetron, polyethylene glycol, acetaminophen **OR** acetaminophen      Intake/Output Summary (Last 24 hours) at 7/4/2022 1302  Last data filed at 7/4/2022 1012  Gross per 24 hour   Intake 0 ml   Output 1400 ml   Net -1400 ml       Physical Exam Performed:    /72   Pulse 89   Temp 98.4 °F (36.9 °C) (Axillary)   Resp 14   Ht 5' 10\" (1.778 m)   Wt 169 lb 14.4 oz (77.1 kg)   SpO2 99%   BMI 24.38 kg/m²     General appearance: NAD, lying in bed  HEENT: Pupils equal, round. No JVD  Neck: Supple, with full range of motion. No jugular venous distention. Respiratory:  Normal respiratory effort. Clear to auscultation, bilaterally without Rales/Wheezes/Rhonchi. Cardiovascular: Regular rate and rhythm with normal S1/S2 without murmurs, rubs or gallops. Abdomen: Soft, non-tender, non-distended with normal bowel sounds. Musculoskeletal: No clubbing, cyanosis or edema bilaterally. Full range of motion without deformity. Skin: Skin color, texture, turgor normal.  No rashes or lesions. Neurologic:  [place].   grossly 6/18/2022  - Seen by SLP.  MBS showed multilevel aspiration  - Finished 7 days of Unasyn (6/16-6/23) followed by Augmentin (6/23-6-28)  - Can prob be weaned off O2 (% on 2L)  - Dysphagia diet;  Minced & Moist, with moderately Thick liquids  - If having recurrent aspirations, may need Surgery eval. in view of his large hiatal hernia; although he may not appear to be an ideal candidate clarence as will likely re-aspirate in view of the size of his hernia, placing him at high risk of recurrent aspiration, respiratory failure. Was seen by Dr. Danelle Hartman: O/p f/u in 2 weeks. - Aspiration precautions, SLP follow up  - Pall Med was consulted re goals of care/code status        Acute respiratory failure with hypoxia:  Sec to aspiration pneumonia  - O2 sat was less than 90% on room air.    - Completed antibx for pneumonia  - Can prob be weaned off O2 (% on 2L)        Esophageal ulcer: POA  - Seen by GI. Had EGD: esophageal ulcer and biopsies obtained. Circumferential Brito's to 25cms   Pathology: chronic inflammation and reactive changes. - No intestinal metaplasia, dysplasia, or malignancy identified. - BID PPI x 8 weeks; then daily        Generalized weakness   Dementia  - Update B12, level; TSH: ordered. Pending.  - Supportive care        Acute metabolic encephalopathy   - Appears resolved, back to baseline  - Supportive care        CKD stage IIIb  He presented with a creatinine of 1.7. He has baseline creatinine between 2.5-1.5 since at least 2019. MARY ruled out  -Nephrology following        HTN-controlled inpatient without medication  - Continue to hold home Toprol for now        DVT Prophylaxis: Lovenox  Diet: ADULT DIET;  Dysphagia - Minced and Moist; Moderately Thick (Honey)  ADULT ORAL NUTRITION SUPPLEMENT; Breakfast, Lunch, Dinner; Frozen Oral Supplement  Code Status: Full Code    PT/OT Eval Status: ordered      Dispo/plan:  - Patient ready for discharge pending placement/precert      Marla Hawley

## 2022-07-04 NOTE — PROGRESS NOTES
Interval History and plan:      BP is well controlled   Better  creatinine 1.4  Urine is 700ml +  Awaiting placement    Plan:    Encourage oral intake  He is in stage IIIb chronic kidney disease since 2019. Creatinine is currently near baseline. He is off BP medicines   Will follow as out patient on DC  Potassium is hemolyzed                    Assessment :     Stage IIIb chronic kidney disease: He presented with a creatinine of 1.7. He has baseline creatinine fluctuates between 2.5-1.5. He has kidney disease since 2019. He has 800 mg / day of proteinuria   Free light chain ratio is 2.3 acceptable for CKD     Anemia: Hemoglobin is 10.5 and there is no need for Procrit. Aspiration pneumonia/COVID-19: CT chest shows right lower lobe crowding and airspace disease. [facility] would like to thank Ginger Marie MD   for opportunity to serve this patient      Please call with questions at-   24 Hrs Answering service [phone] or  7 am- 5 pm via Perfect serve or cell phone  Valente Fuchs MD          CC/reason for consult :     Renal insufficiency     HPI :     Mariam Sylvester is a 80 y.o. male presented to   the hospital on 6/16/2022 with aspiration pneumonia    He has past medical history significant for chronic atrial fibrillation on Eliquis, aortic valve stenosis, chronic kidney disease stage III, history of COVID-19 pneumonia, rhabdomyolysis, severe protein calorie malnourishment. He is now admitted with aspiration pneumonia and is on antibiotics. He is also on isolation for COVID-19. Creatinine on arrival was 2.4. We are called for further management of renal insufficiency.     ROS:     Seen with-family in the room    positives in bold   Labs were obtained                All other remaining systems are negative or unable to obtain        PMH/PSH/SH/Family History:     Past Medical History:   Diagnosis Date    COVID-19 06/10/2022    [facility]       Past Surgical History: Procedure Laterality Date    UPPER GASTROINTESTINAL ENDOSCOPY N/A 6/20/2022    EGD BIOPSY performed by Manisha Lima MD at [facility] 28        reports that he has never smoked. He has never used smokeless tobacco. He reports previous alcohol use. He reports that he does not use drugs. family history is not on file.          Medication:     Current Facility-Administered Medications: guaiFENesin-dextromethorphan (ROBITUSSIN DM) 100-10 MG/5ML syrup 5 mL, 5 mL, Oral, Q4H PRN  HYDROcodone-acetaminophen (NORCO) 5-325 MG per tablet 1 tablet, 1 tablet, Oral, Q6H PRN  pantoprazole (PROTONIX) tablet 40 mg, 40 mg, Oral, BID AC  potassium chloride (KLOR-CON M) extended release tablet 40 mEq, 40 mEq, Oral, PRN **OR** potassium bicarb-citric acid (EFFER-K) effervescent tablet 40 mEq, 40 mEq, Oral, PRN **OR** potassium chloride 10 mEq/100 mL IVPB (Peripheral Line), 10 mEq, IntraVENous, PRN  magnesium sulfate 1000 mg in dextrose 5% 100 mL IVPB, 1,000 mg, IntraVENous, PRN  melatonin disintegrating tablet 5 mg, 5 mg, Oral, Nightly  sodium chloride flush 0.9 % injection 10 mL, 10 mL, IntraVENous, 2 times per day  sodium chloride flush 0.9 % injection 10 mL, 10 mL, IntraVENous, PRN  0.9 % sodium chloride infusion, , IntraVENous, PRN  enoxaparin (LOVENOX) injection 40 mg, 40 mg, SubCUTAneous, Daily  ondansetron (ZOFRAN) injection 4 mg, 4 mg, IntraVENous, Q4H PRN  polyethylene glycol (GLYCOLAX) packet 17 g, 17 g, Oral, Daily PRN  acetaminophen (TYLENOL) tablet 650 mg, 650 mg, Oral, Q4H PRN **OR** acetaminophen (TYLENOL) suppository 650 mg, 650 mg, Rectal, Q4H PRN       Vitals :     Vitals:    07/03/22 1944   BP: 113/75   Pulse: 91   Resp: 20   Temp: 98.5 °F (36.9 °C)   SpO2: 95%       I & O :       Intake/Output Summary (Last 24 hours) at 7/4/2022 8945  Last data filed at 7/3/2022 1738  Gross per 24 hour   Intake --   Output 700 ml   Net -700 ml        Physical Examination :     Minimal exam was performed to minimize exposure to COVID-19. Patient was seen and evaluated from the door.   Preserving PPE       LABS:     Recent Labs     07/02/22  0417 07/02/22  2128   WBC 7.4 7.5   HGB 11.2* 11.4*   HCT 33.7* 34.3*    340     Recent Labs     07/02/22  0417 07/04/22  0432    134*   K 4.6 5.5*    102   CO2 23 24   BUN 22* 18   CREATININE 1.6* 1.4*   GLUCOSE 92 88      Nephrology  [address] [place] [address]  Office: [phone]  Fax: [phone]

## 2022-07-04 NOTE — PLAN OF CARE
Problem: ABCDS Injury Assessment  Goal: Absence of physical injury  Outcome: Progressing     Problem: Respiratory - Adult  Goal: Achieves optimal ventilation and oxygenation  Outcome: Progressing     Problem: Discharge Planning  Goal: Discharge to home or other facility with appropriate resources  Outcome: Progressing     Problem: Safety - Adult  Goal: Free from fall injury  Outcome: Progressing     Problem: Skin/Tissue Integrity  Goal: Absence of new skin breakdown  Description: 1. Monitor for areas of redness and/or skin breakdown  2. Assess vascular access sites hourly  3. Every 4-6 hours minimum:  Change oxygen saturation probe site  4. Every 4-6 hours:  If on nasal continuous positive airway pressure, respiratory therapy assess nares and determine need for appliance change or resting period.   Outcome: Progressing     Problem: Nutrition Deficit:  Goal: Optimize nutritional status  Outcome: Progressing

## 2022-07-05 VITALS
TEMPERATURE: 99 F | RESPIRATION RATE: 16 BRPM | SYSTOLIC BLOOD PRESSURE: 130 MMHG | HEIGHT: 70 IN | BODY MASS INDEX: 24.35 KG/M2 | DIASTOLIC BLOOD PRESSURE: 89 MMHG | OXYGEN SATURATION: 96 % | WEIGHT: 170.1 LBS | HEART RATE: 93 BPM

## 2022-07-05 PROCEDURE — 6360000002 HC RX W HCPCS: Performed by: INTERNAL MEDICINE

## 2022-07-05 PROCEDURE — 6370000000 HC RX 637 (ALT 250 FOR IP): Performed by: INTERNAL MEDICINE

## 2022-07-05 PROCEDURE — 97530 THERAPEUTIC ACTIVITIES: CPT

## 2022-07-05 PROCEDURE — 2580000003 HC RX 258: Performed by: INTERNAL MEDICINE

## 2022-07-05 PROCEDURE — 97535 SELF CARE MNGMENT TRAINING: CPT

## 2022-07-05 RX ADMIN — ENOXAPARIN SODIUM 40 MG: 100 INJECTION SUBCUTANEOUS at 08:55

## 2022-07-05 RX ADMIN — PANTOPRAZOLE SODIUM 40 MG: 40 TABLET, DELAYED RELEASE ORAL at 06:39

## 2022-07-05 RX ADMIN — SODIUM CHLORIDE, PRESERVATIVE FREE 10 ML: 5 INJECTION INTRAVENOUS at 08:54

## 2022-07-05 NOTE — PROGRESS NOTES
Interval History and plan:      BP is well controlled   stable creatinine 1.4  Urine is 1350 ml   Awaiting placement  Labs are pending     Plan:    Encourage oral intake  He is in stage IIIb chronic kidney disease since 2019. Creatinine is currently near baseline. He is off BP medicines   Will follow as out patient on DC                      Assessment :     Stage IIIb chronic kidney disease: He presented with a creatinine of 1.7. He has baseline creatinine fluctuates between 2.5-1.5. He has kidney disease since 2019. He has 800 mg / day of proteinuria   Free light chain ratio is 2.3 acceptable for CKD     Anemia: Hemoglobin is 10.5 and there is no need for Procrit. Aspiration pneumonia/COVID-19: CT chest shows right lower lobe crowding and airspace disease. [facility] would like to thank Ginger Marie MD   for opportunity to serve this patient      Please call with questions at-   24 Hrs Answering service [phone] or  7 am- 5 pm via Perfect serve or cell phone  Valente Fuchs MD          CC/reason for consult :     Renal insufficiency     HPI :     Mariam Sylvester is a 80 y.o. male presented to   the hospital on 6/16/2022 with aspiration pneumonia    He has past medical history significant for chronic atrial fibrillation on Eliquis, aortic valve stenosis, chronic kidney disease stage III, history of COVID-19 pneumonia, rhabdomyolysis, severe protein calorie malnourishment. He is now admitted with aspiration pneumonia and is on antibiotics. He is also on isolation for COVID-19. Creatinine on arrival was 2.4. We are called for further management of renal insufficiency.     ROS:     Seen with-family in the room    positives in bold   Labs were obtained                All other remaining systems are negative or unable to obtain        PMH/PSH/SH/Family History:     Past Medical History:   Diagnosis Date    COVID-19 06/10/2022    [facility]       Past Surgical History: Procedure Laterality Date    UPPER GASTROINTESTINAL ENDOSCOPY N/A 6/20/2022    EGD BIOPSY performed by Juliano Red MD at [facility] 28        reports that he has never smoked. He has never used smokeless tobacco. He reports previous alcohol use. He reports that he does not use drugs. family history is not on file.          Medication:     Current Facility-Administered Medications: guaiFENesin-dextromethorphan (ROBITUSSIN DM) 100-10 MG/5ML syrup 5 mL, 5 mL, Oral, Q4H PRN  HYDROcodone-acetaminophen (NORCO) 5-325 MG per tablet 1 tablet, 1 tablet, Oral, Q6H PRN  pantoprazole (PROTONIX) tablet 40 mg, 40 mg, Oral, BID AC  potassium chloride (KLOR-CON M) extended release tablet 40 mEq, 40 mEq, Oral, PRN **OR** potassium bicarb-citric acid (EFFER-K) effervescent tablet 40 mEq, 40 mEq, Oral, PRN **OR** potassium chloride 10 mEq/100 mL IVPB (Peripheral Line), 10 mEq, IntraVENous, PRN  magnesium sulfate 1000 mg in dextrose 5% 100 mL IVPB, 1,000 mg, IntraVENous, PRN  melatonin disintegrating tablet 5 mg, 5 mg, Oral, Nightly  sodium chloride flush 0.9 % injection 10 mL, 10 mL, IntraVENous, 2 times per day  sodium chloride flush 0.9 % injection 10 mL, 10 mL, IntraVENous, PRN  0.9 % sodium chloride infusion, , IntraVENous, PRN  enoxaparin (LOVENOX) injection 40 mg, 40 mg, SubCUTAneous, Daily  ondansetron (ZOFRAN) injection 4 mg, 4 mg, IntraVENous, Q4H PRN  polyethylene glycol (GLYCOLAX) packet 17 g, 17 g, Oral, Daily PRN  acetaminophen (TYLENOL) tablet 650 mg, 650 mg, Oral, Q4H PRN **OR** acetaminophen (TYLENOL) suppository 650 mg, 650 mg, Rectal, Q4H PRN       Vitals :     Vitals:    07/05/22 0744   BP: 118/72   Pulse: 88   Resp: 16   Temp: 98.2 °F (36.8 °C)   SpO2: 96%       I & O :       Intake/Output Summary (Last 24 hours) at 7/5/2022 0811  Last data filed at 7/5/2022 0650  Gross per 24 hour   Intake 360 ml   Output 1350 ml   Net -990 ml        Physical Examination :     Minimal exam was performed to minimize

## 2022-07-05 NOTE — PROGRESS NOTES
Hospitalist Progress Note      PCP: Osman Goldsmith MD    Date of Admission: 6/16/2022    Chief Complaint: Aspiration Pneumonia    Hospital Course: H&P    Subjective:   Maki Fat and examined at bedside. Patient alert, awake. Per staff patient tolerating minced and moist diet however needing one-to-one feeding assistance    Waiting for placement. No new issues    Medications:  Reviewed    Infusion Medications    sodium chloride 25 mL (06/23/22 0528)     Scheduled Medications    pantoprazole  40 mg Oral BID AC    melatonin  5 mg Oral Nightly    sodium chloride flush  10 mL IntraVENous 2 times per day    enoxaparin  40 mg SubCUTAneous Daily     PRN Meds: guaiFENesin-dextromethorphan, HYDROcodone 5 mg - acetaminophen, potassium chloride **OR** potassium alternative oral replacement **OR** potassium chloride, magnesium sulfate, sodium chloride flush, sodium chloride, ondansetron, polyethylene glycol, acetaminophen **OR** acetaminophen      Intake/Output Summary (Last 24 hours) at 7/5/2022 1058  Last data filed at 7/5/2022 1015  Gross per 24 hour   Intake 600 ml   Output 1550 ml   Net -950 ml       Physical Exam Performed:    /72   Pulse 69   Temp 98.2 °F (36.8 °C) (Oral)   Resp 16   Ht 5' 10\" (1.778 m)   Wt 170 lb 1.6 oz (77.2 kg)   SpO2 96%   BMI 24.41 kg/m²     General appearance: NAD, lying in bed  HEENT: Pupils equal, round. No JVD  Neck: Supple, with full range of motion. No jugular venous distention. Respiratory:  Normal respiratory effort. Clear to auscultation, bilaterally without Rales/Wheezes/Rhonchi. Cardiovascular: Regular rate and rhythm with normal S1/S2 without murmurs, rubs or gallops. Abdomen: Soft, non-tender, non-distended with normal bowel sounds. Musculoskeletal: No clubbing, cyanosis or edema bilaterally. Full range of motion without deformity. Skin: Skin color, texture, turgor normal.  No rashes or lesions. Neurologic:  [place]   grossly non-focal.  Psychiatric: Alert and awake  Capillary Refill: Brisk,3 seconds, normal   Peripheral Pulses: +2 palpable, equal bilaterally       Labs:   Recent Labs     07/02/22 2128 07/04/22  1509   WBC 7.5 8.5   HGB 11.4* 12.1*   HCT 34.3* 36.5*    364     Recent Labs     07/04/22  0432   *   K 5.5*      CO2 24   BUN 18   CREATININE 1.4*   CALCIUM 8.1*     No results for input(s): AST, ALT, BILIDIR, BILITOT, ALKPHOS in the last 72 hours. No results for input(s): INR in the last 72 hours. No results for input(s): Beatrice Comber in the last 72 hours. Urinalysis:      Lab Results   Component Value Date/Time    NITRU Negative 06/20/2022 01:05 PM    [address] 06/20/2022 01:05 PM    RBCUA  06/20/2022 01:05 PM    BLOODU LARGE 06/20/2022 01:05 PM    SPECGRAV 1.025 06/20/2022 01:05 PM    GLUCOSEU Negative 06/20/2022 01:05 PM     Radiology:  [facility] MODIFIED BARIUM SWALLOW W VIDEO   Final Result      1. Multilevel episodes of tracheal aspiration as detailed above. CT CHEST WO CONTRAST   Final Result   1. Right lower lobe bronchovascular crowding and airspace disease, small pneumonia with adjacent pleural thickening   2. Large hiatal hernia   3. Cardiomegaly with coronary artery calcification   4. Vertebral body fracture of L1 is age indeterminate. XR CHEST PORTABLE   Final Result   1. Cardiomegaly and aortic tortuosity   2. Minimal hazy density right lung base, differential radiolucency of the lungs is secondary to patient rotation   3. Hypertrophic osteophytes of the shoulders, left greater than right              Assessment/Plan:        Pneumonia, due to aspiration   - CT: Right lower lobe pneumonia  - Pxt has a large Hiatal hernia, and dementia, all which predispose him to recurrent aspirations. - Pt tested positive for the COVID-19 virus in 06/09/2022, but did not appear to have been felt to have COVID pneumonia.  Tested negative 6/18/2022  - Seen by SLP.  MBS showed multilevel aspiration  - Finished 7 days of Unasyn (6/16-6/23) followed by Augmentin (6/23-6-28)  - Can prob be weaned off O2 (% on 2L)  - Dysphagia diet;  Minced & Moist, with moderately Thick liquids  - If having recurrent aspirations, may need Surgery eval. in view of his large hiatal hernia; although he may not appear to be an ideal candidate clarence as will likely re-aspirate in view of the size of his hernia, placing him at high risk of recurrent aspiration, respiratory failure. Was seen by Dr. Bronson Chu: O/p f/u in 2 weeks. - Aspiration precautions, SLP follow up  - Pall Med was consulted re goals of care/code status        Acute respiratory failure with hypoxia:  Sec to aspiration pneumonia  - O2 sat was less than 90% on room air.    - Completed antibx for pneumonia  - Can prob be weaned off O2 (% on 2L)        Esophageal ulcer: POA  - Seen by GI. Had EGD: esophageal ulcer and biopsies obtained. Circumferential Brito's to 25cms   Pathology: chronic inflammation and reactive changes. - No intestinal metaplasia, dysplasia, or malignancy identified. - BID PPI x 8 weeks; then daily        Generalized weakness   Dementia  - Update B12, level; TSH: ordered. Pending.  - Supportive care        Acute metabolic encephalopathy   - Appears resolved, back to baseline  - Supportive care        CKD stage IIIb  He presented with a creatinine of 1.7. He has baseline creatinine between 2.5-1.5 since at least 2019. MARY ruled out  -Nephrology following        HTN-controlled inpatient without medication  - Continue to hold home Toprol for now        DVT Prophylaxis: Lovenox  Diet: ADULT DIET;  Dysphagia - Minced and Moist; Moderately Thick (Honey)  ADULT ORAL NUTRITION SUPPLEMENT; Breakfast, Lunch, Dinner; Frozen Oral Supplement  Code Status: Full Code    PT/OT Eval Status: ordered      Dispo/plan:  - Patient ready for discharge pending placement/precert      Villa Hoang MD

## 2022-07-05 NOTE — PROGRESS NOTES
Physical Therapy  [address]  Physical [address] Treatment    Name: Cintia Verde  : 1935  MRN: [id number]  Date of Service: 2022    Discharge Recommendations: Cintia Verde scored a  on the AM-PAC short mobility form. Current research shows that an AM-PAC score of 17 or less is typically not associated with a discharge to the patient's home setting. Based on the patient's AM-PAC score and their current functional mobility deficits, it is recommended that the patient have 3-5 sessions per week of Physical Therapy at d/c to increase the patient's independence. Please see assessment section for further patient specific details. If patient discharges prior to next session this note will serve as a discharge summary. Please see below for the latest assessment towards goals. PT Equipment Recommendations  Other: defer      Patient Diagnosis(es): The primary encounter diagnosis was Aspiration into airway, initial encounter. A diagnosis of Dysphagia, unspecified type was also pertinent to this visit. Past Medical History:  has a past medical history of COVID-19. Past Surgical History:  has a past surgical history that includes Upper gastrointestinal endoscopy (N/A, 2022). Assessment   Body Structures, Functions, Activity Limitations Requiring Skilled Therapeutic Intervention: Decreased functional mobility ; Decreased ROM; Decreased strength;Decreased endurance;Decreased cognition;Decreased balance; Increased pain  Assessment: Pt requires 2 person assist for bed mobility. Demos improved alertness and activity tolerance this session as compared to previous therapy session. Mobility is limited by generalized weakness and pain. Rec continued IP PT.   Treatment Diagnosis: Decreased mobility and endurance  Requires PT Follow-Up: Yes     Plan   Plan  Plan:  (2-5x/week)  Current Treatment Recommendations: Strengthening,ROM,Functional mobility training,Transfer training,Patient/Caregiver education & training,Pain management,Gait training,Stair training,Endurance training  Safety Devices  Type of Devices: All fall risk precautions in place,Bed alarm in place,Call light within reach,Left in bed,Nurse notified  Restraints  Restraints Initially in Place: No     Restrictions  Position Activity Restriction  Other position/activity restrictions: up with assist     Subjective   General  Chart Reviewed: Yes  Additional Pertinent Hx: Pt is an 80year old male that presents from home to the ED from c/o of a fall from weakness due to COVID + diagnosis on 6/9. Chest CT: Right lower lobe bronchovascular crowding and airspace disease, small pneumonia with adjacent pleural thickening. PMH: Only covid listed due to pt unable to state history. Family / Caregiver Present: No  Diagnosis: Aspiration into Airway. Subjective  Subjective: Pt found supine in bed. Agrees to PT. Pleasant and cooperative. Reports \"medium\" pain B knees, appears chronic. Pt positioned for comfort at end of session. RN aware. Social/Functional History  Social/Functional History  Lives With: Alone,Daughter  Type of Home: House  Home Layout: One level,Able to Live on Main level with bedroom/bathroom  Home Access: Stairs to enter with rails  Entrance Stairs - Number of Steps: 2  Entrance Stairs - Rails: Both  Bathroom Shower/Tub: Walk-in shower  Bathroom Equipment: Grab bars in shower,Shower chair  Bathroom Accessibility: Wheelchair accessible  Home Equipment: Walker, rolling,Cane  Has the patient had two or more falls in the past year or any fall with injury in the past year?: Yes  Receives Help From: Family  ADL Assistance: Independent  Toileting: Independent  Homemaking Assistance: Independent  Ambulation Assistance: Needs assistance  Transfer Assistance: Independent  Active : No  Occupation: Retired  Additional Comments: History was taken by daughter present in the room.  Pt was living independently before the fall and is planning to go home with daughter who has a handicap suite in her house. Vision/Hearing       Cognition   Orientation  Overall Orientation Status: Impaired (oriented to self, \"hospital\" - states incorrect hospital, not oriented to month)     Objective                              Bed mobility  Supine to Sit: Maximum assistance;2 Person assistance  Sit to Supine: Dependent/Total;2 Person assistance  Scooting: Maximal assistance;2 Person assistance (attempted lateral scooting at EOB)           Balance  Comments: pt sat EOB x 7 min with CGA for static sitting, min A at times for dynamic tasks. Unable to participate in standing balance assessment due to B knee pain and generalized weakness. OutComes Score                                                  AM-PAC Score  AM-PAC Inpatient Mobility Raw Score : 7 (07/05/22 1202)  AM-PAC Inpatient T-Scale Score : 26.42 (07/05/22 1202)  Mobility Inpatient CMS 0-100% Score: 92.36 (07/05/22 1202)  Mobility Inpatient CMS G-Code Modifier : CM (07/05/22 1202)          Goals  Short Term Goals  Time Frame for Short term goals: discharge  ongoing 7/5  Short term goal 1: Rolling Left Mod A, on going  Short term goal 2: Supine to sit Mod A, on going  Short term goal 3: Sitting balance for 8 minutes with no LOB, PARTIALLY MET 7/5  New goal: pt will sit x 5 min with supervision  Patient Goals   Patient goals : Not stated       Education  Patient Education  Education Given To: Patient  Education Provided: Role of Therapy;Plan of Care  Education Provided Comments: seated balance  Education Method: Verbal;Demonstration  Barriers to Learning: Cognition; Hearing  Education Outcome: Verbalized understanding;Continued education needed      Therapy Time   Individual Concurrent Group Co-treatment   Time In 1140         Time Out 1203         Minutes 23                 Timed Code Treatment Minutes:  23    Total Treatment Minutes:  23    If patient is discharged prior to next treatment, this note will serve as the discharge summary.   Aaron Lino, PT, DPT  [id number]

## 2022-07-05 NOTE — PROGRESS NOTES
Occupational Therapy  Facility/Department: [place] [address] [facility]  Occupational Therapy Treatment    Name: Parish Rosas  : 1935  MRN: [id number]  Date of Service: 2022    Discharge Recommendations:    Parish Rosas scored a 10/24 on the AM-PAC ADL Inpatient form. Current research shows that an AM-PAC score of 17 or less is typically not associated with a discharge to the patient's home setting. Based on the patient's AM-PAC score and their current ADL deficits, it is recommended that the patient have 3-5 sessions per week of Occupational Therapy at d/c to increase the patient's independence. Please see assessment section for further patient specific details. If patient discharges prior to next session this note will serve as a discharge summary. Please see below for the latest assessment towards goals. OT Equipment Recommendations  Equipment Needed: No  Other: defer       Patient Diagnosis(es): The primary encounter diagnosis was Aspiration into airway, initial encounter. A diagnosis of Dysphagia, unspecified type was also pertinent to this visit. Past Medical History:  has a past medical history of COVID-19. Past Surgical History:  has a past surgical history that includes Upper gastrointestinal endoscopy (N/A, 2022). Treatment Diagnosis: impaired mobility, transfers, ADL      Assessment   Performance deficits / Impairments: Decreased functional mobility ; Decreased ADL status; Decreased endurance;Decreased strength;Decreased cognition;Decreased posture  Assessment: Pt is very pleasant. Pt with increased c/o LE pain with transitioning supine to sit. Pain appeared more tolerable once sitting for a couple minutes. Pt was unable to attempt standing and req max assist of 2 for lateral scooting. Feel pt would benefit from further IP OT services.   Treatment Diagnosis: impaired mobility, transfers, ADL  Prognosis: Fair  REQUIRES OT FOLLOW-UP: Yes  Activity Tolerance  Activity Tolerance: Patient limited by pain        Plan   Plan  Times per Week: 2-5  Times per Day: Daily  Current Treatment Recommendations: Strengthening,ROM,Balance training,Functional mobility training,Endurance training,Self-Care / ADL     Restrictions  Position Activity Restriction  Other position/activity restrictions: up with assist    Subjective   General  Chart Reviewed: Yes  Patient assessed for rehabilitation services?: Yes  Additional Pertinent Hx: 80y.o. year-old male who was admitted to [facility] 1 week ago for falls and increased weakness. He was found to have COVID-19 but had no symptoms other than increased weakness. While there he failed a swallow evaluation and was advised to follow a dysphagia diet with thickened liquids. He was discharged yesterday to [facility] for physical therapy rehab. Apparently the recommended diet was not followed and he aspirated. He developed shortness of breath and an x-ray was done which was consistent with aspiration. On evaluation in the emergency room he was found to have a new oxygen requirement associated with aspiration pneumonia. Family / Caregiver Present: No  Referring Practitioner: Jenn Mendenhall MD  Diagnosis: aspiration  Subjective  Subjective: Pt in bed upon entry. It feels good when I rub them. (Pt co knee pain with movement.   Pt positioned to comfort)     Social/Functional History  Social/Functional History  Lives With: Alone,Daughter  Type of Home: House  Home Layout: One level,Able to Live on Main level with bedroom/bathroom  Home Access: Stairs to enter with rails  Entrance Stairs - Number of Steps: 2  Entrance Stairs - Rails: Both  Bathroom Shower/Tub: Walk-in shower  Bathroom Equipment: Grab bars in shower,Shower chair  Bathroom Accessibility: Wheelchair accessible  Home Equipment: Albino Hawks, rolling,Cane  Has the patient had two or more falls in the past year or any fall with injury in the past year?: Yes  Receives Help From: Family  ADL Assistance: Independent  Toileting: Independent  Homemaking Assistance: Independent  Ambulation Assistance: Needs assistance  Transfer Assistance: Independent  Active : No  Occupation: Retired  Additional Comments: History was taken by daughter present in the room. Pt was living independently before the fall and is planning to go home with daughter who has a handicap suite in her house. Objective   Safety Devices  Type of Devices: All fall risk precautions in place; Bed alarm in place;Call light within reach; Left in bed;Nurse notified  Balance  Sitting - Static:  (SBA to sit edge of bed ~7 min)        ADL  Feeding: Setup (to wipe face, seated edge of bed)  Toileting:  (Catheter)        Bed mobility  Supine to Sit: Maximum assistance;2 Person assistance  Sit to Supine: Dependent/Total;2 Person assistance  Scooting: Maximal assistance;2 Person assistance (attempted lateral scooting at EOB)        Orientation  Overall Orientation Status: Impaired (oriented to self, \"hospital\" - states incorrect hospital, not oriented to month)               Exercise Treatment: Pt declined  Education Given To: Patient  Education Provided: Role of Therapy;Plan of Care  Education Method: Verbal  Education Outcome: Continued education needed         AM-PAC Score        AM-[facility] Inpatient Daily Activity Raw Score: 10 (07/05/22 1228)  AM-PAC Inpatient ADL T-Scale Score : 27.31 (07/05/22 1228)  ADL Inpatient CMS 0-100% Score: 74.7 (07/05/22 1228)  ADL Inpatient CMS G-Code Modifier : CL (07/05/22 1228)    Goals  Short Term Goals  Time Frame for Short term goals: dc  Short Term Goal 1: supine to sit [place]- not met  Short Term Goal 2: sit balance CGA EOB for 5 min    -MET 7/5/22-  Short Term Goal 3: grooming ADL EOB with CGA- not met  Short Term Goal 4: Tolerate fx transfer assessment- not met       Therapy Time   Individual Concurrent Group Co-treatment   Time In 1137         Time Out 1204         Minutes 27         Timed Code Treatment Minutes: 27 Minutes    [address] [facility], OTR/L [id number]

## 2022-07-05 NOTE — PLAN OF CARE
Problem: ABCDS Injury Assessment  Goal: Absence of physical injury  Outcome: Progressing     Problem: Respiratory - Adult  Goal: Achieves optimal ventilation and oxygenation  Outcome: Progressing     Problem: Discharge Planning  Goal: Discharge to home or other facility with appropriate resources  Outcome: Progressing     Problem: Pain  Goal: Verbalizes/displays adequate comfort level or baseline comfort level  Outcome: Progressing     Problem: Safety - Adult  Goal: Free from fall injury  Outcome: Progressing     Problem: Skin/Tissue Integrity  Goal: Absence of new skin breakdown  Description: 1. Monitor for areas of redness and/or skin breakdown  2. Assess vascular access sites hourly  3. Every 4-6 hours minimum:  Change oxygen saturation probe site  4. Every 4-6 hours:  If on nasal continuous positive airway pressure, respiratory therapy assess nares and determine need for appliance change or resting period.   Outcome: Progressing     Problem: Nutrition Deficit:  Goal: Optimize nutritional status  Outcome: Progressing

## 2023-04-24 NOTE — FLOWSHEET NOTE
06/22/22 1241   Encounter Summary   Encounter Overview/Reason  Advance Care Planning   Service Provided For: Patient and family together   Referral/Consult From: Patient; Family   Support System Children   Last Encounter    (es 6/22)   Complexity of Encounter High   Begin Time 1200   End Time  1243   Total Time Calculated 43 min   Advance Care Planning   Type ACP conversation; Completed AD/ACP document(s)   Assessment/Intervention/Outcome   Assessment Calm   Intervention Active listening;Discussed belief system/Buddhism practices/lindy;Discussed illness injury and its impact;Prayer (assurance of)/[place];Explored/Affirmed feelings, thoughts, concerns; Other (Comment)  (completed HCPOA paperwork)   Outcome Receptive   Plan and Referrals   Plan/Referrals No future visits requested Is This A New Presentation, Or A Follow-Up?: Follow Up Humira How Severe Is It?: moderate

## (undated) DEVICE — FORCEPS BIOPSY RADIAL JAW 4 LG

## (undated) DEVICE — MASK CAPNOGRAPHY AD W35IN DIA58IN SAMP LN L10FT O2 LN